# Patient Record
Sex: FEMALE | Race: WHITE
[De-identification: names, ages, dates, MRNs, and addresses within clinical notes are randomized per-mention and may not be internally consistent; named-entity substitution may affect disease eponyms.]

---

## 2017-01-14 NOTE — DIAGNOSTIC IMAGING REPORT
CHEST ONE VIEW PORTABLE



CLINICAL HISTORY: Sepsis SHORTNESS OF BREATH



COMPARISON STUDY:  September 2, 2016



FINDINGS: The heart is at the upper limits of normal in size. There is

radiographic evidence of emphysema. There is no overt failure. There are linear

bibasilar opacities, likely atelectatic.[ 



IMPRESSION: Emphysema. Bibasal atelectasis/scarring. No evidence of acute

parenchymal consolidation.







Electronically signed by:  rByce Fermin M.D.

1/14/2017 7:11 AM



Dictated Date/Time:  1/14/2017 7:10 AM

## 2017-01-14 NOTE — HOSPITALIST PROGRESS NOTE
Hospitalist Progress Note


Date of Service


Jan 14, 2017.





Subjective


Pt evaluation today including:  conversation w/ patient, physical exam, chart 

review, lab review, review of studies, review of inpatient medication list


Patient reports minimal improvement in symptoms since admission. + SOB, wheezing

, and productive cough- yellow sputum (chronic). She is eating and drinking OK. 

Patient denies any fever, chills, sweats, lightheadedness, dizziness, vision 

changes, CP, palpitations, edema, abdominal pain, nausea, vomiting, diarrhea, 

urinary symptoms, melena, numbness/tingling, weakness, muscle/joint pain, 

anxiety/depression, active bleeding, or new skin discoloration/changes.





Medications





 Current Inpatient Medications








 Medications


  (Trade)  Dose


 Ordered  Sig/Roya


 Route  Start Time


 Stop Time Status Last Admin


Dose Admin


 


 Acetaminophen


  (Tylenol Tab)  650 mg  Q4H  PRN


 PO  1/14/17 04:00


 2/13/17 03:59   


 


 


 Zolpidem Tartrate


  (Ambien Tab)  5 mg  HSZ  PRN


 PO  1/14/17 04:00


 2/13/17 03:59   


 


 


 Nitroglycerin


  (Nitrostat Tab)  0.4 mg  UD  PRN


 SL  1/14/17 04:00


 2/13/17 03:59   


 


 


 Cyanocobalamin


  (Vitamin B-12


 Tab)  1,000 mcg  DAILY


 PO  1/14/17 09:00


 2/13/17 08:59   


 


 


 Fluoxetine HCl


  (Prozac Cap)  20 mg  QAM


 PO  1/14/17 09:00


 2/13/17 08:59   


 


 


 Furosemide


  (Lasix tab)  20 mg  DAILY


 PO  1/14/17 09:00


 2/13/17 08:59   


 


 


 Glipizide


  (Glucotrol Tab)  5 mg  BIDM


 PO  1/14/17 08:00


 2/13/17 07:59   


 


 


 Metoprolol


 Succinate


  (Toprol Xl Tab)  100 mg  DAILY


 PO  1/14/17 09:00


 2/13/17 08:59   


 


 


 Pravastatin Sodium


  (Pravachol Tab)  10 mg  PM


 PO  1/14/17 21:00


 2/13/17 20:59   


 


 


 Ropinirole HCl


  (Requip Tab)  2 mg  TID


 PO  1/14/17 09:00


 2/13/17 08:59   


 


 


 Spironolactone


  (Aldactone Tab)  12.5 mg  QAM


 PO  1/14/17 09:00


 2/13/17 08:59   


 


 


 Tramadol HCl


  (Ultram Tab)  50 mg  Q6  PRN


 PO  1/14/17 04:00


 2/13/17 03:59   


 


 


 Ferrous Sulfate


  (Feosol Tab)  325 mg  DAILY


 PO  1/14/17 09:00


 2/13/17 08:59   


 


 


 Ondansetron HCl


  (Zofran Inj)  4 mg  Q6H  PRN


 IV  1/14/17 04:00


 2/13/17 03:59   


 


 


 Insulin Aspart


  (novoLOG ASPART)  **SLIDING


 SCALE**


 **If C...  ACHS


 SC  1/14/17 06:30


 2/13/17 06:59   


 


 


 Glucose


  (Glucose 40% Gel)    UD  PRN


 PO  1/14/17 04:00


 2/13/17 03:59   


 


 


 Glucose


  (Glucose Chew


 Tab)  1 tabs  UD  PRN


 PO  1/14/17 04:00


 2/13/17 03:59   


 


 


 Dextrose


  (Dextrose 50%


 50ML Syringe)  50 ml  UD  PRN


 IV  1/14/17 04:00


 2/13/17 03:59   


 


 


 Glucagon 1 mg  1 mg  UD  PRN


 SQ  1/14/17 04:00


 2/13/17 03:59   


 


 


 Piperacillin Sod/


 Tazobactam Sod


 4.5 gm/Dextrose  120 ml @ 


 30 mls/hr  Q8H


 IV  1/14/17 10:00


 1/21/17 09:59   


 


 


 Methylprednisolone


 Sodium Succinate/


 Syringe


  (Solu-Medrol IV/


 Syringe)  0.64 ml @ 


 1.5 mls/min  Q8H


 IV  1/14/17 10:00


 2/13/17 09:59   


 


 


 Guaifenesin


  (Organidin Nr


 Tab)  600 mg  BID


 PO  1/14/17 09:00


 2/13/17 08:59   


 


 


 Ipratropium


 Bromide


  (Atrovent 0.02%


 0.5MG/2.5ML Neb)  0.5 mg  Q6R


 INH  1/14/17 09:00


 2/13/17 08:59  1/14/17 07:29


0.5 MG


 


 Levalbuterol


  (Xopenex 1.25MG/


 0.5ML Neb)  1.25 mg  Q6R


 INH  1/14/17 09:00


 2/13/17 08:59  1/14/17 07:29


1.25 MG


 


 Ipratropium


 Bromide


  (Atrovent 0.02%


 0.5MG/2.5ML Neb)  0.5 mg  Q2H  PRN


 INH  1/14/17 04:15


 2/13/17 04:14   


 


 


 Levalbuterol


  (Xopenex 1.25MG/


 0.5ML Neb)  1.25 mg  Q2H  PRN


 INH  1/14/17 04:15


 2/13/17 04:14   


 


 


 Piperacillin Sod/


 Tazobactam Sod


  (Consult)  1 ea  UD  PRN


 N/A  1/14/17 05:15


 2/13/17 05:14   


 


 


 Diphenhydramine


 HCl


  (Benadryl Inj)  25 mg  Q4H  PRN


 IV  1/14/17 06:45


 2/13/17 06:44  1/14/17 06:48


25 MG


 


 Insulin Glargine


  (Lantus Solostar


 Pen)  22 unit  BID


 SC  1/14/17 21:00


 2/13/17 20:59   


 











Objective


Vital Signs











  Date Time  Temp Pulse Resp B/P Pulse Ox O2 Delivery O2 Flow Rate FiO2


 


1/14/17 06:59 36.8 95 18 144/78 91 Nasal Cannula 3.0 


 


1/14/17 05:33 37.3 115 26 117/67  Nasal Cannula 3.0 95


 


1/14/17 04:00  105 26 105/54 94 Nasal Cannula 3.0 


 


1/14/17 03:32  115 28  89   


 


1/14/17 03:28    117/67    


 


1/14/17 03:27  114 23  89   


 


1/14/17 03:22  123 30  85   


 


1/14/17 03:17  124 28  86   


 


1/14/17 03:12  115 28  100   


 


1/14/17 03:07  114 32  100   


 


1/14/17 03:02  113 31  100   


 


1/14/17 02:58    111/63    


 


1/14/17 02:57  112 26  100   


 


1/14/17 02:53    124/64    


 


1/14/17 02:52  113 30  100   


 


1/14/17 02:47  109 37  100   


 


1/14/17 02:42  107 26  100   


 


1/14/17 02:37  104 24  99   


 


1/14/17 02:32  104 24  100   


 


1/14/17 02:27  99 32  99   


 


1/14/17 02:22  97 24  99   


 


1/14/17 02:17  96 31  99   


 


1/14/17 02:12  96 24  98   


 


1/14/17 02:07  96 24  98   


 


1/14/17 02:02  92 22  97   


 


1/14/17 01:57  90 29  99   


 


1/14/17 01:52  90 30  98   


 


1/14/17 01:47  90 28  98   


 


1/14/17 01:42  87 36  98   


 


1/14/17 01:37  89 38  98   


 


1/14/17 01:32  92 29  91   


 


1/14/17 01:27  91 24  91   


 


1/14/17 01:22  92 31  91   


 


1/14/17 01:19     93 Nasal Cannula 4.0 


 


1/14/17 01:19     93 Nasal Cannula 4.0 


 


1/14/17 01:19 37.3 96 28 125/67 93 Nasal Cannula 4.0 


 


1/14/17 01:17  89 31  91   


 


1/14/17 01:12  84 18  92   


 


1/14/17 01:09    125/67    











Physical Exam


General Appearance:  no apparent distress, + obese


Eyes:  normal inspection, PERRL


ENT:  hearing grossly normal


Neck:  supple


Respiratory/Chest:  no respiratory distress, no accessory muscle use, + 

decreased breath sounds, + wheezing (diffuse )


Cardiovascular:  regular rate, rhythm


Abdomen:  normal bowel sounds, non tender, soft


Extremities:  no calf tenderness, + swelling (trace bilateral pitting edema )


Neurologic/Psychiatric:  alert, normal mood/affect, oriented x 3


Skin:  normal color, warm/dry, no rash





Laboratory Results





Last 24 Hours








Test


  1/14/17


01:48 1/14/17


01:49 1/14/17


02:00 1/14/17


02:33


 


Bedside Lactic Acid Venous 2.06 mmol/L    


 


Sodium Level  139 mmol/L   


 


Potassium Level  4.7 mmol/L   


 


Chloride Level  99 mmol/L   


 


Carbon Dioxide Level  32 mmol/L   


 


Anion Gap  8.0 mmol/L   


 


Blood Urea Nitrogen  42 mg/dl   


 


Creatinine  1.70 mg/dl   


 


Est Creatinine Clear Calc


Drug Dose 


  33.8 ml/min 


  


  


 


 


Estimated GFR (


American) 


  33.8 


  


  


 


 


Estimated GFR (Non-


American 


  29.2 


  


  


 


 


BUN/Creatinine Ratio  24.9   


 


Random Glucose  300 mg/dl   


 


Calcium Level  8.7 mg/dl   


 


Total Bilirubin  0.3 mg/dl   


 


Aspartate Amino Transf


(AST/SGOT) 


  22 U/L 


  


  


 


 


Alanine Aminotransferase


(ALT/SGPT) 


  28 U/L 


  


  


 


 


Alkaline Phosphatase  105 U/L   


 


Total Protein  6.2 gm/dl   


 


Albumin  3.1 gm/dl   


 


Globulin  3.1 gm/dl   


 


Albumin/Globulin Ratio  1.0   


 


Chemistry Specimen Hemolysis     


 


Prothrombin Time   10.6 SECONDS  


 


Prothromb Time International


Ratio 


  


  1.0 


  


 


 


Activated Partial


Thromboplast Time 


  


  24.5 SECONDS 


  


 


 


Partial Thromboplastin Ratio   0.9  


 


Total Creatine Kinase   36 U/L  


 


Creatine Kinase MB   0.5 ng/ml  


 


Creatine Kinase MB Ratio   1.4  


 


Troponin I   < 0.015 ng/ml  


 


Pro-B-Type Natriuretic Peptide   695 pg/ml  


 


White Blood Count    4.36 K/uL 


 


Red Blood Count    3.67 M/uL 


 


Hemoglobin    11.9 g/dL 


 


Hematocrit    36.3 % 


 


Mean Corpuscular Volume    98.9 fL 


 


Mean Corpuscular Hemoglobin    32.4 pg 


 


Mean Corpuscular Hemoglobin


Concent 


  


  


  32.8 g/dl 


 


 


Platelet Count    111 K/uL 


 


Mean Platelet Volume    10.7 fL 


 


Neutrophils (%) (Auto)    76.5 % 


 


Lymphocytes (%) (Auto)    13.5 % 


 


Monocytes (%) (Auto)    9.6 % 


 


Eosinophils (%) (Auto)    0.2 % 


 


Basophils (%) (Auto)    0.0 % 


 


Neutrophils # (Auto)    3.33 K/uL 


 


Lymphocytes # (Auto)    0.59 K/uL 


 


Monocytes # (Auto)    0.42 K/uL 


 


Eosinophils # (Auto)    0.01 K/uL 


 


Basophils # (Auto)    0.00 K/uL 


 


RDW Standard Deviation    49.5 fL 


 


RDW Coefficient of Variation    13.7 % 


 


Immature Granulocyte % (Auto)    0.2 % 


 


Immature Granulocyte # (Auto)    0.01 K/uL 














Test


  1/14/17


05:54 1/14/17


06:24 1/14/17


07:20 1/14/17


07:21


 


Lactic Acid Level  2.2 mmol/L   


 


Bedside Glucose   387 mg/dl  368 mg/dl 











Assessment and Plan


The patient is a 72-year-old female who presents emergency department with 

complaint of worsening shortness of breath over the past week.  The symptoms 

initially began as a head congestion that then extended into her chest.  She 

went to see her PCP earlier in the week was placed on Levaquin and Prednisone 

on Jan. 10th. She has not had any relief of her symptoms in fact they've been 

worsening.  She does use nebulizers at home without relief. Patient was 

admitted on 9/3/16 for acute COPD exacerbation. 





Acute exacerbation of chronic COPD:


-Admit to the [facility]


-Solu-Medrol 40 mg IV every 8 hours after having received on 125 mg IV and 

emergency department.  


-Vancomycin IV per renal dosing and Levofloxacin 500 mg IV every 24 hours d/c'd


-Zosyn 3.375 mg IV every 8 hours


-Guaifenesin extended release 600 mg by mouth twice a day


-Xopenex with Atrovent nebulizers to use every 6 hours while awake and every 2 

hours when necessary


-Continue QVAR inhaler BID 


-Pending blood cultures 


-Patient follows with Gray Robert PA-C 





T2DM:


-Continue Glipizide 5 mg by mouth twice a day 


-Patient was given a one-time dose of Lantus 20 units subcutaneous on admission 

due to starting IV steroids


-Lantus 22 units subcutaneous twice a day while on IV steroids, titrate as 

needed 


-BSG AC & HS with sliding insulin scale 





Hypertension/diastolic CHF:


-Continue Furosemide 40 mg by mouth twice a day 


-Continue Potassium extended release 20 mEq by mouth daily 


-Continue Metoprolol succinate 100 mg by mouth daily


-Continue Lisinopril 10 mg by mouth daily





Hypercholesterolemia:


-Continue Pravastatin 10 mg by mouth daily





Restless leg syndrome:


-Continue Ropinirole 2 mg by mouth 3 times a day





Depression:


-Continue Fluoxetine 20 mg by mouth every morning





Anemia:


-Continue iron supplement 325 mg PO daily 


-Continue b12 supplemental 1000 g by mouth daily





DVT prophylaxis:


-GEETHA and SCDs





Dispo:


-Return to home when medically stable

## 2017-01-14 NOTE — EMERGENCY ROOM VISIT NOTE
History


Report prepared by Randell:  Carine Oneal


Under the Supervision of:  Dr. Michelle Robertson D.O.


First contact with patient:  01:08


Chief Complaint:  RESPIRATORY PROBLEMS


Stated Complaint:  Difficulty Breathing, cold symptoms





History of Present Illness


The patient is a 72 year old female who presents to the Emergency Room with 

complaints of persistent shortness of breath that began Saturday, but has been 

worsening.  The patient states that Saturday she developed a head and chest 

cold.  She states that she talked with her PCP and was placed on Levaquin and 

Prednisone on Monday, but denies any relief of her symptoms.  The patient 

states that her symptoms seem to keep worsening.  She notes that she has been 

increasingly fatigued at home, but denies any fever.  The patient notes a 

history of COPD, but denies any history of pneumonia.  She states that she has 

nebulizers at home that she has been using without relief.  The patient states 

that she has been eating and drinking normally.





   Source of History:  patient


   Onset:  Saturday


   Position:  other (global)


   Quality:  other (shortness of breath)


   Timing:  worsening, other (persistent)


   Associated Symptoms:  + fatigue





Review of Systems


See HPI for pertinent positives & negatives. A total of 10 systems reviewed and 

were otherwise negative.





Past Medical & Surgical


Medical Problems:


(1) CHF (congestive heart failure)


(2) CHF exacerbation


(3) COPD (chronic obstructive pulmonary disease)


(4) Diab Sanjuana Wo Compl, Type Ii Or Unspec Type, Uncontrolled


(5) Hyperlipidemia Nec/Nos


(6) Hypertension Nos


(7) Hypoxemia


(8) Respiratory failure, acute-on-chronic








Family History





Cancer


Diabetes mellitus


FH: heart disease





Social History


Smoking Status:  Former Smoker


Alcohol Use:  none


Drug Use:  none


Marital Status:  single


Housing Status:  nursing home


Occupation Status:  retired





Current/Historical Medications


Scheduled


Beclomethasone Dipropionate (Qvar), 2 PUFFS INH BID


Cyanocobalamin (Vitamin B12 500MCG), 1,000 MCG PO DAILY


Ferrous Sulfate (Iron Supplement), 325 MG PO DAILY


Fluoxetine Hcl (Prozac), 20 MG PO QAM


Furosemide (Lasix), 20 MG PO DAILY


Glipizide (Glipizide), 5 MG PO BID


Ipratropium-Albuterol (Duoneb), 1 TREATMENT INH DAILY


Levofloxacin (Levaquin), 500 MG PO DAILY


Lisinopril (Prinivil), 10 MG PO DAILY


Metoprolol Succ (Toprol Xl) (Toprol-Xl ), 100 MG PO DAILY


Pravastatin Sodium (Pravastatin Sodium), 10 MG PO DAILY


Ropinirole (Requip), 2 MG PO TID


Spironolactone (Aldactone), 12.5 MG PO QAM





Scheduled PRN


Amoxicillin (Amoxil), 500 MG PO UD PRN for DENTAL WORK


Tramadol HCl (Tramadol HCl), 50 MG PO Q6 PRN for Pain





Allergies


Coded Allergies:  


     Atropine (Verified  Allergy, Severe, RASH, 1/14/17)


     Dobutamine (Verified  Allergy, Severe, RASH, 1/14/17)





Physical Exam


Vital Signs











  Date Time  Temp Pulse Resp B/P Pulse Ox O2 Delivery O2 Flow Rate FiO2


 


1/14/17 01:19     93 Nasal Cannula 4.0 


 


1/14/17 01:19     93 Nasal Cannula 4.0 


 


1/14/17 01:19 37.3 96 28 125/67 93 Nasal Cannula 4.0 











Physical Exam


HEENT: Head - normocephalic and atraumatic   Pupils are equal, round, and 

reactive to light.  Extraocular eye muscles are intact, and sclera are 

anicteric.   Nose -  moist nasal mucosa without discharge. Mouth - moist buccal 

mucosa.  Oropharynx is nonerythematous and there is no tonsillar exudate or 

edema noted.


Neck: Supple; no JVD, nuchal rigidity, cervical lymphadenopathy.


Heart: Tachycardic rate and rhythm.  There is a normal S1 and S2 with no murmurs

, clicks, or gallops appreciated.


Lungs: Inspiratory and expiratory wheezing.


Abdomen: Soft, completely nontender, nondistended, with good bowel sounds.  

There are no palpable pulsatile masses or hepatosplenomegaly.  There is no 

guarding, rigidity, or rebound noted.


Extremities: Trace pedal edema. No evidence of cyanosis, clubbing.  There are 

easily palpable peripheral pulses.


Skin: Skin is hot.  warm and dry with good turgor and no rashes.





Medical Decision & Procedures


ER Provider


Diagnostic Interpretation:


Portable chest x-ray: compared from September 2016: no significant change, mild 

pulmonary vascular congestion but no consolidation





Laboratory Results


1/14/17 02:33








Red Blood Count 3.67, Mean Corpuscular Volume 98.9, Mean Corpuscular Hemoglobin 

32.4, Mean Corpuscular Hemoglobin Concent 32.8, Mean Platelet Volume 10.7, 

Neutrophils (%) (Auto) 76.5, Lymphocytes (%) (Auto) 13.5, Monocytes (%) (Auto) 

9.6, Eosinophils (%) (Auto) 0.2, Basophils (%) (Auto) 0.0, Neutrophils # (Auto) 

3.33, Lymphocytes # (Auto) 0.59, Monocytes # (Auto) 0.42, Eosinophils # (Auto) 

0.01, Basophils # (Auto) 0.00





1/14/17 01:49

















Test


  1/14/17


01:48 1/14/17


01:49 1/14/17


02:00 1/14/17


02:33


 


Bedside Lactic Acid Venous


  2.06 mmol/L


(0.90-1.70) 


  


  


 


 


Anion Gap


  


  8.0 mmol/L


(3-11) 


  


 


 


Est Creatinine Clear Calc


Drug Dose 


  33.8 ml/min 


  


  


 


 


Estimated GFR (


American) 


  33.8 


  


  


 


 


Estimated GFR (Non-


American 


  29.2 


  


  


 


 


BUN/Creatinine Ratio  24.9 (10-20)   


 


Calcium Level


  


  8.7 mg/dl


(8.5-10.1) 


  


 


 


Total Bilirubin


  


  0.3 mg/dl


(0.2-1) 


  


 


 


Aspartate Amino Transf


(AST/SGOT) 


  22 U/L (15-37) 


  


  


 


 


Alanine Aminotransferase


(ALT/SGPT) 


  28 U/L (12-78) 


  


  


 


 


Alkaline Phosphatase


  


  105 U/L


() 


  


 


 


Total Protein


  


  6.2 gm/dl


(6.4-8.2) 


  


 


 


Albumin


  


  3.1 gm/dl


(3.4-5.0) 


  


 


 


Globulin


  


  3.1 gm/dl


(2.5-4.0) 


  


 


 


Albumin/Globulin Ratio  1.0 (0.9-2)   


 


Chemistry Specimen Hemolysis     


 


Prothrombin Time


  


  


  10.6 SECONDS


(9.0-12.0) 


 


 


Prothromb Time International


Ratio 


  


  1.0 (0.9-1.1) 


  


 


 


Activated Partial


Thromboplast Time 


  


  24.5 SECONDS


(21.0-31.0) 


 


 


Partial Thromboplastin Ratio   0.9  


 


Total Creatine Kinase


  


  


  36 U/L


() 


 


 


Creatine Kinase MB


  


  


  0.5 ng/ml


(0.5-3.6) 


 


 


Creatine Kinase MB Ratio   1.4 (0-3.0)  


 


Troponin I


  


  


  < 0.015 ng/ml


(0-0.045) 


 


 


Pro-B-Type Natriuretic Peptide


  


  


  695 pg/ml


(0-900) 


 


 


White Blood Count


  


  


  


  4.36 K/uL


(4.8-10.8)


 


Red Blood Count


  


  


  


  3.67 M/uL


(4.2-5.4)


 


Hemoglobin


  


  


  


  11.9 g/dL


(12.0-16.0)


 


Hematocrit    36.3 % (37-47) 


 


Mean Corpuscular Volume


  


  


  


  98.9 fL


()


 


Mean Corpuscular Hemoglobin


  


  


  


  32.4 pg


(25-34)


 


Mean Corpuscular Hemoglobin


Concent 


  


  


  32.8 g/dl


(32-36)


 


Platelet Count


  


  


  


  111 K/uL


(130-400)


 


Mean Platelet Volume


  


  


  


  10.7 fL


(7.4-10.4)


 


Neutrophils (%) (Auto)    76.5 % 


 


Lymphocytes (%) (Auto)    13.5 % 


 


Monocytes (%) (Auto)    9.6 % 


 


Eosinophils (%) (Auto)    0.2 % 


 


Basophils (%) (Auto)    0.0 % 


 


Neutrophils # (Auto)


  


  


  


  3.33 K/uL


(1.4-6.5)


 


Lymphocytes # (Auto)


  


  


  


  0.59 K/uL


(1.2-3.4)


 


Monocytes # (Auto)


  


  


  


  0.42 K/uL


(0.11-0.59)


 


Eosinophils # (Auto)


  


  


  


  0.01 K/uL


(0-0.5)


 


Basophils # (Auto)


  


  


  


  0.00 K/uL


(0-0.2)


 


RDW Standard Deviation


  


  


  


  49.5 fL


(36.4-46.3)


 


RDW Coefficient of Variation


  


  


  


  13.7 %


(11.5-14.5)


 


Immature Granulocyte % (Auto)    0.2 % 


 


Immature Granulocyte # (Auto)


  


  


  


  0.01 K/uL


(0.00-0.02)








Laboratory results per my review.





Medications Administered











 Medications


  (Trade)  Dose


 Ordered  Sig/Roya


 Route  Start Time


 Stop Time Status Last Admin


Dose Admin


 


 Albuterol/


 Ipratropium


  (Duoneb)  12 ml  ONE  ONCE


 INH  1/14/17 01:30


 1/14/17 01:31 DC 1/14/17 01:37


12 ML


 


 Methylprednisolone


 Sodium Succinate


  (Solu-Medrol IV)  125 mg  NOW  STAT


 IV  1/14/17 02:10


 1/14/17 02:11 DC 1/14/17 02:41


125 MG


 


 Piperacillin Sod/


 Tazobactam Sod


  (Zosyn Iv)  4.5 gm  NOW  STAT


 IV  1/14/17 02:30


 1/14/17 02:31 DC 1/14/17 02:41


4.5 GM











Procedure


Patient was treated with DuoNeb 12 ml INH, Solu-Medrol  mg IV, and Zosyn 

IV 4.5 gm IV.





ECG


Indication:  SOB/dyspnea


Rate (beats per minute):  110


Rhythm:  sinus tachycardia


Findings:  no acute ischemic change, no ectopy





ED Course


0113: Past medical records reviewed. The patient was evaluated in room A3. A 

complete history and physical exam was performed.  A twelve-lead EKG was 

obtained.  An IV lock was initiated and labs were drawn as above.





0130: Ordered DuoNeb 12 ml INH.  A chest x-ray was performed.





0207: I reevaluated the patient and she is still not feeling better, noting 

that she is still short of breath.  She is currently receiving her hour long 

DuoNeb treatment.  I discussed her recent steroid prescription and she notes 

that it was a taper.  I discussed the treatment plan with her.  She verbalized 

complete understanding and agreement.  She will be evaluated for further 

treatment.





0210: Ordered Solu-Medrol  mg IV.





0230: Ordered Zosyn IV 4.5 gm IV.





0326: I discussed the patient's case with MEGAN Lara.  He is going 

to evaluate the patient for further treatment.





Medical Decision


The patient is a 72 year old female who presents to the ED with shortness of 

breath. Differential diagnosis includes COPD exacerbation, pneumonia, bronchitis

, CHF.





Lab interpretation: Lactic acid is 2.06, white count 4.3, hemoglobin 11.9, 

platelet 111, glucose 300, BUN 42, Creatinine 1.7, trop less than 0.015, 

, Co-Ags are normal.





This is a 72-year-old female patient presents to the emergency department with 

increasing shortness of breath despite being on steroid taper and antibiotics.





The patient has a history of COPD exacerbations.  She has also had pneumonia 

and bronchitis in the past.  Chest x-ray shows no obvious consolidation but her 

description of symptoms sound consistent with pneumonia or bronchitis.  Patient 

was getting some improvement in her symptoms with the hour-long nebulizer 

treatment.





I discussed the case with the [facility] and they will evaluate 

for further management.





Consults


Time Called:  0307


Consulting Physician:  MEGAN Lara


Returned Call:  [phone]


I discussed the patient's case with MEGAN Lara.  He is going to 

evaluate the patient for further treatment.





Impression





 Primary Impression:  


 COPD exacerbation





Scribe Attestation


The scribe's documentation has been prepared under my direction and personally 

reviewed by me in its entirety. I confirm that the note above accurately 

reflects all work, treatment, procedures, and medical decision making performed 

by me.





Departure Information


Dispostion


Being Evaluated By Hospitalist





Referrals


Marty Tineo D.O. (PCP)

## 2017-01-15 NOTE — HOSPITALIST PROGRESS NOTE
Hospitalist Progress Note


Date of Service


Colin 15, 2017.





Subjective


Pt evaluation today including:  conversation w/ patient, physical exam, chart 

review, lab review, review of inpatient medication list


Voiding:  no voiding problems, no incontinence


Patient states she is feeling lousy today. SOB/non-productive cough has not 

improved. Extremely SOB with exertion. +body aches. Difficulty with eating/

drinking because of SOB.  Patient denies any fever, chills, sweats, 

lightheadedness, dizziness, vision changes, CP, palpitations, edema, abdominal 

pain, nausea, vomiting, diarrhea, urinary symptoms, melena, numbness/tingling, 

weakness, anxiety/depression, active bleeding, or new skin discoloration/

changes.





Medications





 Current Inpatient Medications








 Medications


  (Trade)  Dose


 Ordered  Sig/Roya


 Route  Start Time


 Stop Time Status Last Admin


Dose Admin


 


 Acetaminophen


  (Tylenol Tab)  650 mg  Q4H  PRN


 PO  1/14/17 04:00


 2/13/17 03:59   


 


 


 Zolpidem Tartrate


  (Ambien Tab)  5 mg  HSZ  PRN


 PO  1/14/17 04:00


 2/13/17 03:59   


 


 


 Nitroglycerin


  (Nitrostat Tab)  0.4 mg  UD  PRN


 SL  1/14/17 04:00


 2/13/17 03:59   


 


 


 Cyanocobalamin


  (Vitamin B-12


 Tab)  1,000 mcg  DAILY


 PO  1/14/17 09:00


 2/13/17 08:59  1/14/17 08:41


1,000 MCG


 


 Fluoxetine HCl


  (Prozac Cap)  20 mg  QAM


 PO  1/14/17 09:00


 2/13/17 08:59  1/14/17 08:43


20 MG


 


 Furosemide


  (Lasix tab)  20 mg  DAILY


 PO  1/14/17 09:00


 2/13/17 08:59  1/14/17 08:44


20 MG


 


 Glipizide


  (Glucotrol Tab)  5 mg  BIDM


 PO  1/14/17 08:00


 2/13/17 07:59  1/14/17 17:39


5 MG


 


 Metoprolol


 Succinate


  (Toprol Xl Tab)  100 mg  DAILY


 PO  1/14/17 09:00


 2/13/17 08:59  1/14/17 08:42


100 MG


 


 Pravastatin Sodium


  (Pravachol Tab)  10 mg  PM


 PO  1/14/17 21:00


 2/13/17 20:59  1/14/17 20:27


10 MG


 


 Ropinirole HCl


  (Requip Tab)  2 mg  TID


 PO  1/14/17 09:00


 2/13/17 08:59  1/14/17 20:27


2 MG


 


 Spironolactone


  (Aldactone Tab)  12.5 mg  QAM


 PO  1/14/17 09:00


 2/13/17 08:59  1/14/17 08:44


12.5 MG


 


 Tramadol HCl


  (Ultram Tab)  50 mg  Q6  PRN


 PO  1/14/17 04:00


 2/13/17 03:59   


 


 


 Ferrous Sulfate


  (Feosol Tab)  325 mg  DAILY


 PO  1/14/17 09:00


 2/13/17 08:59  1/14/17 08:41


325 MG


 


 Ondansetron HCl


  (Zofran Inj)  4 mg  Q6H  PRN


 IV  1/14/17 04:00


 2/13/17 03:59   


 


 


 Insulin Aspart


  (novoLOG ASPART)  **SLIDING


 SCALE**


 **If C...  ACHS


 SC  1/14/17 06:30


 2/13/17 06:59  1/14/17 17:44


4 UNITS


 


 Glucose


  (Glucose 40% Gel)    UD  PRN


 PO  1/14/17 04:00


 2/13/17 03:59   


 


 


 Glucose


  (Glucose Chew


 Tab)  1 tabs  UD  PRN


 PO  1/14/17 04:00


 2/13/17 03:59   


 


 


 Dextrose


  (Dextrose 50%


 50ML Syringe)  50 ml  UD  PRN


 IV  1/14/17 04:00


 2/13/17 03:59   


 


 


 Glucagon 1 mg  1 mg  UD  PRN


 SQ  1/14/17 04:00


 2/13/17 03:59   


 


 


 Piperacillin Sod/


 Tazobactam Sod


 4.5 gm/Dextrose  120 ml @ 


 30 mls/hr  Q8H


 IV  1/14/17 10:00


 1/21/17 09:59  1/15/17 02:19


30 MLS/HR


 


 Methylprednisolone


 Sodium Succinate/


 Syringe


  (Solu-Medrol IV/


 Syringe)  0.64 ml @ 


 1.5 mls/min  Q8H


 IV  1/14/17 10:00


 2/13/17 09:59  1/15/17 02:19


1.5 MLS/MIN


 


 Guaifenesin


  (Organidin Nr


 Tab)  600 mg  BID


 PO  1/14/17 09:00


 2/13/17 08:59  1/14/17 20:26


600 MG


 


 Ipratropium


 Bromide


  (Atrovent 0.02%


 0.5MG/2.5ML Neb)  0.5 mg  Q6R


 INH  1/14/17 09:00


 2/13/17 08:59  1/15/17 07:47


0.5 MG


 


 Levalbuterol


  (Xopenex 1.25MG/


 0.5ML Neb)  1.25 mg  Q6R


 INH  1/14/17 09:00


 2/13/17 08:59  1/15/17 07:47


1.25 MG


 


 Ipratropium


 Bromide


  (Atrovent 0.02%


 0.5MG/2.5ML Neb)  0.5 mg  Q2H  PRN


 INH  1/14/17 04:15


 2/13/17 04:14   


 


 


 Levalbuterol


  (Xopenex 1.25MG/


 0.5ML Neb)  1.25 mg  Q2H  PRN


 INH  1/14/17 04:15


 2/13/17 04:14   


 


 


 Piperacillin Sod/


 Tazobactam Sod


  (Consult)  1 ea  UD  PRN


 N/A  1/14/17 05:15


 2/13/17 05:14   


 


 


 Diphenhydramine


 HCl


  (Benadryl Inj)  25 mg  Q4H  PRN


 IV  1/14/17 06:45


 2/13/17 06:44  1/14/17 06:48


25 MG


 


 Beclomethasone


 Dipropionate


  (Qvar 80 Mcg Hfa


 Inhaler)  2 puffs  BID


 INH  1/14/17 12:00


 2/13/17 11:59  1/14/17 20:25


2 PUFFS


 


 Insulin Glargine


  (Lantus Solostar


 Pen)  26 unit  BID


 SC  1/14/17 21:00


 2/13/17 20:59  1/14/17 20:21


26 UNIT


 


 Oseltamivir


 Phosphate


  (Tamiflu Susp)  30 mg  BID


 PO  1/15/17 06:00


 1/19/17 20:59  1/15/17 06:17


30 MG


 


 Calcium Carbonate


  (Tums Chew Tab)  500 mg  PRN  PRN


 PO  1/14/17 21:45


 2/13/17 21:44  1/14/17 21:44


500 MG











Objective


Vital Signs











  Date Time  Temp Pulse Resp B/P Pulse Ox O2 Delivery O2 Flow Rate FiO2


 


1/15/17 07:47  77 20  96 Nasal Cannula 3.0 


 


1/15/17 06:50 36.5 67 20 145/91 93 Nasal Cannula 3.0 


 


1/15/17 04:07 36.5 68 16 145/80 97 Nasal Cannula 3.0 


 


1/15/17 03:52      Nasal Cannula 3.0 


 


1/15/17 02:24  72 20  96 Nasal Cannula 3.0 


 


1/15/17 00:00      Nasal Cannula 3.0 


 


1/14/17 23:29 36.5 69 16 148/84 96 Nasal Cannula 3.0 


 


1/14/17 20:54  67 20  96 Nasal Cannula 3.0 


 


1/14/17 20:00      Nasal Cannula 3.0 


 


1/14/17 19:06 36.6 70 18 146/82 95 Nasal Cannula 3.0 


 


1/14/17 16:00     91 Nasal Cannula 3.0 


 


1/14/17 14:49 36.9 69 18 148/81 95 Nasal Cannula 2.0 


 


1/14/17 14:29  68 20  97 Nasal Cannula 3.0 


 


1/14/17 12:00     91 Nasal Cannula 3.0 


 


1/14/17 11:18 36.7 78 18 168/96 96 Room Air  











Physical Exam


General Appearance:  no apparent distress, + obese


Eyes:  normal inspection, PERRL


ENT:  hearing grossly normal


Neck:  supple


Respiratory/Chest:  no respiratory distress, no accessory muscle use, + 

decreased breath sounds, + wheezing


Cardiovascular:  regular rate, rhythm, no edema


Abdomen:  normal bowel sounds, non tender, soft


Extremities:  no pedal edema, no calf tenderness


Neurologic/Psychiatric:  alert, normal mood/affect, oriented x 3


Skin:  normal color, warm/dry, no rash





Laboratory Results





Last 24 Hours








Test


  1/14/17


10:00 1/14/17


11:28 1/14/17


16:11 1/14/17


20:07


 


Hepatitis C Antibody Screen NEG    


 


Bedside Glucose  428 mg/dl  150 mg/dl  109 mg/dl 














Test


  1/15/17


07:36 1/15/17


07:44 


  


 


 


White Blood Count 6.02 K/uL    


 


Red Blood Count 3.68 M/uL    


 


Hemoglobin 11.8 g/dL    


 


Hematocrit 35.1 %    


 


Mean Corpuscular Volume 95.4 fL    


 


Mean Corpuscular Hemoglobin 32.1 pg    


 


Mean Corpuscular Hemoglobin


Concent 33.6 g/dl 


  


  


  


 


 


Platelet Count 121 K/uL    


 


Mean Platelet Volume 10.7 fL    


 


Neutrophils (%) (Auto) 90.9 %    


 


Lymphocytes (%) (Auto) 5.3 %    


 


Monocytes (%) (Auto) 3.5 %    


 


Eosinophils (%) (Auto) 0.0 %    


 


Basophils (%) (Auto) 0.0 %    


 


Neutrophils # (Auto) 5.47 K/uL    


 


Lymphocytes # (Auto) 0.32 K/uL    


 


Monocytes # (Auto) 0.21 K/uL    


 


Eosinophils # (Auto) 0.00 K/uL    


 


Basophils # (Auto) 0.00 K/uL    


 


RDW Standard Deviation 47.1 fL    


 


RDW Coefficient of Variation 13.4 %    


 


Immature Granulocyte % (Auto) 0.3 %    


 


Immature Granulocyte # (Auto) 0.02 K/uL    


 


Prothrombin Time 11.1 SECONDS    


 


Prothromb Time International


Ratio 1.0 


  


  


  


 


 


Activated Partial


Thromboplast Time 24.0 SECONDS 


  


  


  


 


 


Partial Thromboplastin Ratio 0.9    


 


Sodium Level 137 mmol/L    


 


Potassium Level 4.7 mmol/L    


 


Chloride Level 95 mmol/L    


 


Carbon Dioxide Level 33 mmol/L    


 


Anion Gap 9.0 mmol/L    


 


Blood Urea Nitrogen 49 mg/dl    


 


Creatinine 1.80 mg/dl    


 


Est Creatinine Clear Calc


Drug Dose 32.0 ml/min 


  


  


  


 


 


Estimated GFR (


American) 31.6 


  


  


  


 


 


Estimated GFR (Non-


American 27.2 


  


  


  


 


 


BUN/Creatinine Ratio 27.0    


 


Random Glucose 230 mg/dl    


 


Calcium Level 8.7 mg/dl    


 


Magnesium Level 2.3 mg/dl    


 


Bedside Glucose  234 mg/dl   











Assessment and Plan


The patient is a 72-year-old female who presents emergency department with 

complaint of worsening shortness of breath over the past week.  The symptoms 

initially began as a head congestion that then extended into her chest.  She 

went to see her PCP earlier in the week was placed on Levaquin and Prednisone 

on Jan. 10th. She has not had any relief of her symptoms in fact they've been 

worsening.  She does use nebulizers at home without relief. Patient was 

admitted on 9/3/16 for acute COPD exacerbation. 





Acute exacerbation of chronic COPD:


-Admit to the tele


-Solu-Medrol 40 mg IV every 8 hours after having received on 125 mg IV and 

emergency department (11/14). [place] as patient's status improves.   


-Vancomycin IV per renal dosing and Levofloxacin 500 mg IV every 24 hours d/c'd


-Zosyn 3.375 mg IV every 8 hours


-Guaifenesin extended release 600 mg by mouth twice a day


-Xopenex with Atrovent nebulizers to use every 6 hours while awake and every 2 

hours when necessary


-Continue QVAR inhaler BID 


-Pending blood cultures- preliminary negative  


-Requiring 3L O2 with saturations in the low 90's (11/15)- wears 3L at home. 


-Patient follows with Gray Robert PA-C 





Influenza A:


-Tamiflu 75 mg PO BID x5 days (started 1/14, PM)


-Droplet precautions 





T2DM:


-Continue Glipizide 5 mg by mouth twice a day 


-Patient was given a one-time dose of Lantus 20 units subcutaneous on admission 

due to starting IV steroids


-Lantus 22 units subcutaneous twice a day while on IV steroids, titrate as 

needed 


   -- Increased to 26 units BID- better control of sugars 


-BSG AC & HS with sliding insulin scale 





Hypertension/diastolic CHF:


-Continue Furosemide 40 mg by mouth twice a day 


-Continue Potassium extended release 20 mEq by mouth daily 


-Continue Metoprolol succinate 100 mg by mouth daily


-Continue Lisinopril 10 mg by mouth daily





Hypercholesterolemia:


-Continue Pravastatin 10 mg by mouth daily





Restless leg syndrome:


-Continue Ropinirole 2 mg by mouth 3 times a day





Depression:


-Continue Fluoxetine 20 mg by mouth every morning





Anemia:


-Continue iron supplement 325 mg PO daily 


-Continue b12 supplemental 1000 g by mouth daily





CKD, stage III, baseline cr. ~1.7:


-Stable. Follow PRP 





DVT prophylaxis:


-GEETHA and SCDs





Dispo:


-Return to ?home when medically stable


-Likely need PT/OT evaluations prior to discharge

## 2017-01-15 NOTE — PHARMACY PROGRESS NOTE
Pharmacy Antibiotic Consult


Date of Service:


Colin 15, 2017.


Pharmacy Dosing Scope


Pharmacy is consulted to initiate vancomycin IV and pip/tazo IV dosing therapy, 

order appropriate labs and adjust drug dose/frequency.





Subjective


The patient is a 72 year old female admitted on Jan 14, 2017 at 03:55.





Objective


Height (Feet):  5


Height (Inches):  3.00


Weight (Kilograms):  106.000


Lab Results (24hrs):





Laboratory Tests








Test


  1/15/17


07:36


 


BUN/Creatinine Ratio 27.0 


 


Blood Urea Nitrogen 49 mg/dl 


 


Creatinine 1.80 mg/dl 


 


White Blood Count 6.02 K/uL 


 


Red Blood Count 3.68 M/uL 


 


Hemoglobin 11.8 g/dL 


 


Hematocrit 35.1 % 


 


Mean Corpuscular Volume 95.4 fL 


 


Mean Corpuscular Hemoglobin 32.1 pg 


 


Mean Corpuscular Hemoglobin


Concent 33.6 g/dl 


 


 


Platelet Count 121 K/uL 


 


Mean Platelet Volume 10.7 fL 


 


Neutrophils (%) (Auto) 90.9 % 


 


Lymphocytes (%) (Auto) 5.3 % 


 


Monocytes (%) (Auto) 3.5 % 


 


Eosinophils (%) (Auto) 0.0 % 


 


Basophils (%) (Auto) 0.0 % 


 


Neutrophils # (Auto) 5.47 K/uL 


 


Lymphocytes # (Auto) 0.32 K/uL 


 


Monocytes # (Auto) 0.21 K/uL 


 


Eosinophils # (Auto) 0.00 K/uL 


 


Basophils # (Auto) 0.00 K/uL 











Assessment & Plan


ASSESSMENT:


* Patient is a 72 year-old female admitted with pneumonia and COPD. 


* It is noted she was placed on levofloxacin and prednisone outpatient 4 days 

prior to admission and failed therapy. 


* Patient also has poor renal function and an elevated BMI >35kg/m2. 


* Patient is also on tamiflu, per physician management. 


* Pharmacy was consulted to dose the vancomycin IV and pip/tazo IV therapies. 


* Calculated half-life to be ~23hrs. 


* BCx2 show NGTD. 





PLAN:


VANCOMYCIN:


* Vancomycin was given as a load on 1/14 at 2500mg IV x 1 dose on 1/14 ~23mg/kg 

and then was stopped per the physician. 


* It was restarted today at 1300mg IV x 1 dose ~12mg/kg as patient had 

clinically declined. 


* Will get a random level tomorrow morning and will redose patient based off of 

this level for continued maintenance doses. 





PIP/TAZO:


* Will continue patient on the maintenance doses of 4.5 g IV q8h due to CrC>20ml

/min and BMI>30kg/m3.





Pharmacy will continue to follow and will adjust dose/frequency as necessary.  

Thank you

## 2017-01-16 NOTE — HOSPITALIST PROGRESS NOTE
Hospitalist Progress Note


Date of Service


Jan 16, 2017.





Subjective


Pt evaluation today including:  conversation w/ patient, physical exam, chart 

review, lab review, review of studies, review of inpatient medication list


Pain:  None


PO Intake:  Tolerating PO diet, decreased appetite


Voiding:  no voiding problems


Patient states that her breathing is about the same as yesterday.  She 

complains of shortness of breath at rest, dyspnea on exertion, a non-productive 

cough, and intermittent wheezing.  She also complains of decreased appetite, 

although she is tolerating PO food.  She does state that her weakness and 

fatigue have improved from yesterday, although she is still not back to 

baseline.  The patient denies fevers, chills, sweats, chest pain, palpitations, 

claudication, nausea, vomiting, abdominal pain, dysuria, hematuria, urinary 

retention, paralysis, numbness and tingling.





   Additional Comments:


See HPI for pertinent positives and negatives.  All other systems reviewed and 

negative.





Objective


Vital Signs











  Date Time  Temp Pulse Resp B/P Pulse Ox O2 Delivery O2 Flow Rate FiO2


 


1/16/17 08:45      Nasal Cannula 3.0 


 


1/16/17 06:45 36.5 67 80 159/92 96 Nasal Cannula 3.0 


 


1/16/17 02:29  68 18  98 Nasal Cannula 3.0 


 


1/16/17 00:05      Nasal Cannula 3.0 


 


1/15/17 23:33 36.6 67 18 154/84 95 Nasal Cannula 3.0 


 


1/15/17 19:48  67 18  98 Nasal Cannula 3.0 


 


1/15/17 16:00     96 Nasal Cannula 3.0 


 


1/15/17 15:28 36.7 76 20 177/92 96 Nasal Cannula 4.0 











Physical Exam


General Appearance:  WD/WN, no apparent distress, + obese (morbidly obese)


Eyes:  normal inspection, PERRL, EOMI


ENT:  normal ENT inspection, hearing grossly normal, pharynx normal, + 

pertinent finding (no signs of thrush)


Neck:  supple, no JVD, trachea midline


Respiratory/Chest:  lungs clear, no respiratory distress, + decreased breath 

sounds, + pertinent finding (very poor air movement)


Cardiovascular:  regular rate, rhythm, no gallop, no murmur


Abdomen:  normal bowel sounds, non tender, soft


Extremities:  non-tender, normal inspection, no pedal edema


Neurologic/Psychiatric:  alert, normal mood/affect, oriented x 3


Skin:  normal color, warm/dry, no rash





Laboratory Results





Last 24 Hours








Test


  1/15/17


16:50 1/15/17


20:11 1/16/17


06:07 1/16/17


07:29


 


Bedside Glucose 275 mg/dl  236 mg/dl   221 mg/dl 


 


White Blood Count   7.45 K/uL  


 


Red Blood Count   4.08 M/uL  


 


Hemoglobin   13.1 g/dL  


 


Hematocrit   39.3 %  


 


Mean Corpuscular Volume   96.3 fL  


 


Mean Corpuscular Hemoglobin   32.1 pg  


 


Mean Corpuscular Hemoglobin


Concent 


  


  33.3 g/dl 


  


 


 


Platelet Count   126 K/uL  


 


Mean Platelet Volume   10.7 fL  


 


Neutrophils (%) (Auto)   90.3 %  


 


Lymphocytes (%) (Auto)   6.4 %  


 


Monocytes (%) (Auto)   2.4 %  


 


Eosinophils (%) (Auto)   0.0 %  


 


Basophils (%) (Auto)   0.1 %  


 


Neutrophils # (Auto)   6.72 K/uL  


 


Lymphocytes # (Auto)   0.48 K/uL  


 


Monocytes # (Auto)   0.18 K/uL  


 


Eosinophils # (Auto)   0.00 K/uL  


 


Basophils # (Auto)   0.01 K/uL  


 


RDW Standard Deviation   46.9 fL  


 


RDW Coefficient of Variation   13.2 %  


 


Immature Granulocyte % (Auto)   0.8 %  


 


Immature Granulocyte # (Auto)   0.06 K/uL  


 


Prothrombin Time   11.3 SECONDS  


 


Prothromb Time International


Ratio 


  


  1.1 


  


 


 


Activated Partial


Thromboplast Time 


  


  22.3 SECONDS 


  


 


 


Partial Thromboplastin Ratio   0.9  


 


Sodium Level   139 mmol/L  


 


Potassium Level   4.3 mmol/L  


 


Chloride Level   96 mmol/L  


 


Carbon Dioxide Level   35 mmol/L  


 


Anion Gap   8.0 mmol/L  


 


Blood Urea Nitrogen   50 mg/dl  


 


Creatinine   1.80 mg/dl  


 


Est Creatinine Clear Calc


Drug Dose 


  


  32.0 ml/min 


  


 


 


Estimated GFR (


American) 


  


  31.6 


  


 


 


Estimated GFR (Non-


American 


  


  27.2 


  


 


 


BUN/Creatinine Ratio   28.0  


 


Random Glucose   227 mg/dl  


 


Calcium Level   8.7 mg/dl  


 


Magnesium Level   2.5 mg/dl  














Test


  1/16/17


09:45 1/16/17


11:29 


  


 


 


Random Vancomycin Level 17.2 mcg/ml    


 


Bedside Glucose  265 mg/dl   











Diagnostic Results


Reviewed EKG and agree with interpretation as follows:





69 bpm, NSR





Assessment and Plan


73 y/o female with a history of COPD, CHF, DM II, HTN, and HLD who presents to 

the ED on 1/14 with worsening shortness of breath over the past week.  The 

symptoms initially began as a head congestion that then extended into her 

chest.  She went to see her PCP earlier in the week was placed on Levaquin and 

Prednisone on Jan. 10th. She has not had any relief of her symptoms.  She does 

use nebulizers at home without relief. Patient was admitted on 9/3/16 for acute 

COPD exacerbation. 





Acute exacerbation of chronic COPD


-Admit to telemetry


-Continue Solu-Medrol 40 mg IV q8h as pt still has very poor air movement, no 

improvement of sx.  Received Solu-Medrol 125 mg IV x 1 in ED on 1/14.


-D/C Vancomycin IV and Levofloxacin 500 mg IV 


-Continue Zosyn IV


-Guaifenesin extended release 600 mg PO BID


-Xopenex/Atrovent nebulizers q6hWA and q2h prn SOB/wheezing


-Continue QVAR inhaler BID 


-Blood cultures NGTD x 2


-Requiring 3L O2 with saturations in mid to high 90s (1/16)- wears 3L at home. 


-Patient follows with Gray Robert PA-C, requested to see him when he comes for 

hospital rounds on 1/17.  Consult order placed


-Sputum culture if produced





Influenza A


-Tamiflu 30 mg PO BID x5 days (started 1/14, PM).


-Droplet precautions 





Diabetes mellitus type 2--last recorded HgbA1c in 2014


-Continue Glipizide 5 mg PO BID


-Patient was given a one-time dose of Lantus 20 units subcutaneous on admission 

due to starting IV steroids


-Lantus 22 units subcutaneous twice a day while on IV steroids, titrate as 

needed 


   -- Increased to 26 units BID- better control of sugars 


-BSG AC & HS with sliding insulin scale 


-Recheck HgbA1c





HTN


-Continue metoprolol succinate 100 mg PO qd and lisinopril 10 mg PO qd





Chronic diastolic CHF


-Continue furosemide 40 mg PO BID and potassium extended release 20 mEq PO qd





HLD


-Continue pravastatin 10 mg PO qd





Restless leg syndrome


-Continue ropinirole 2 mg PO TID





Depression


-Continue fluoxetine 20 mg PO qd





Anemia


-Continue iron supplement 325 mg PO qd


-Continue B12 supplemental 1000 g PO qd





CKD, stage III, baseline cr. ~1.7


-Stable. Creatinine 1.8 1/16


-Follow with PRP 





DVT prophylaxis:


-GEETHA and SCDs





Dispo:


-Return to home when medically stable


-Likely need PT/OT evaluations prior to discharge

## 2017-01-17 NOTE — HOSPITALIST PROGRESS NOTE
Hospitalist Progress Note


Date of Service


Jan 17, 2017.





Subjective


Pt evaluation today including:  conversation w/ patient, physical exam, chart 

review, lab review, review of studies, review of inpatient medication list


Patient reports feeling about the same.  She states that her breathing still 

has not improved.  She complains of shortness of breath at rest, OMALLEY, a non-

productive cough, and intermittent wheezing.  She states that even prolonged 

talking can make her feel short of breath.  She still has a decreased appetite.

  She does state that her weakness and fatigue has continued to improve.  The 

patient denies fevers, chills, sweats, chest pain, palpitations, claudication, 

nausea, vomiting, abdominal pain, dysuria, hematuria, urinary retention, 

paralysis, weakness, numbness and tingling.





   Additional Comments:


See HPI for pertinent positives and negatives.  All other systems reviewed and 

negative.





Objective


Vital Signs











  Date Time  Temp Pulse Resp B/P Pulse Ox O2 Delivery O2 Flow Rate FiO2


 


1/17/17 08:45      Nasal Cannula 3.0 


 


1/17/17 07:18 36.5 58 18 147/79 98 Nasal Cannula 3.0 


 


1/17/17 00:34 36.6 63 18 161/88 96 Room Air  


 


1/16/17 23:59     98 Nasal Cannula 2.0 


 


1/16/17 16:00     98 Nasal Cannula 3.0 95


 


1/16/17 14:46 36.7 64 18 148/82 98 Nasal Cannula 3.0 











Physical Exam


General Appearance:  WD/WN, no apparent distress, + obese (morbidly obese)


Eyes:  normal inspection, PERRL, EOMI


ENT:  normal ENT inspection, hearing grossly normal, pharynx normal (no thrush)


Neck:  supple, no JVD, trachea midline


Respiratory/Chest:  lungs clear, no respiratory distress, + decreased breath 

sounds, + pertinent finding (very limited air movement, unable to take an 

adequate deep breath)


Cardiovascular:  regular rate, rhythm, no gallop, no murmur


Abdomen:  normal bowel sounds, non tender, soft


Extremities:  non-tender, normal inspection, + swelling (1+ pitting edema)


Neurologic/Psychiatric:  alert, normal mood/affect, oriented x 3


Skin:  normal color, warm/dry, no rash





Laboratory Results





Last 24 Hours








Test


  1/16/17


16:29 1/16/17


19:53 1/17/17


07:30 1/17/17


07:43


 


Bedside Glucose 201 mg/dl  230 mg/dl   205 mg/dl 


 


White Blood Count   6.86 K/uL  


 


Red Blood Count   3.87 M/uL  


 


Hemoglobin   12.5 g/dL  


 


Hematocrit   37.9 %  


 


Mean Corpuscular Volume   97.9 fL  


 


Mean Corpuscular Hemoglobin   32.3 pg  


 


Mean Corpuscular Hemoglobin


Concent 


  


  33.0 g/dl 


  


 


 


Platelet Count   109 K/uL  


 


Mean Platelet Volume   10.8 fL  


 


Neutrophils (%) (Auto)   89.5 %  


 


Lymphocytes (%) (Auto)   6.3 %  


 


Monocytes (%) (Auto)   2.9 %  


 


Eosinophils (%) (Auto)   0.0 %  


 


Basophils (%) (Auto)   0.1 %  


 


Neutrophils # (Auto)   6.14 K/uL  


 


Lymphocytes # (Auto)   0.43 K/uL  


 


Monocytes # (Auto)   0.20 K/uL  


 


Eosinophils # (Auto)   0.00 K/uL  


 


Basophils # (Auto)   0.01 K/uL  


 


RDW Standard Deviation   47.1 fL  


 


RDW Coefficient of Variation   13.3 %  


 


Immature Granulocyte % (Auto)   1.2 %  


 


Immature Granulocyte # (Auto)   0.08 K/uL  


 


Platelet Estimate   DECREASED  


 


Sodium Level   140 mmol/L  


 


Potassium Level    mmol/L  


 


Chloride Level   98 mmol/L  


 


Carbon Dioxide Level   34 mmol/L  


 


Anion Gap   8.0 mmol/L  


 


Blood Urea Nitrogen   45 mg/dl  


 


Creatinine   1.80 mg/dl  


 


Est Creatinine Clear Calc


Drug Dose 


  


  32.0 ml/min 


  


 


 


Estimated GFR (


American) 


  


  31.6 


  


 


 


Estimated GFR (Non-


American 


  


  27.2 


  


 


 


BUN/Creatinine Ratio   25.1  


 


Random Glucose   195 mg/dl  


 


Calcium Level   8.6 mg/dl  


 


Magnesium Level    mg/dl  














Test


  1/17/17


09:15 1/17/17


11:39 


  


 


 


Potassium Level 4.5 mmol/L    


 


Magnesium Level 2.5 mg/dl    


 


Bedside Glucose  170 mg/dl   











Diagnostic Results


Reviewed the following studies and agree with interpretation as follows:








                                                                               

                                                                 


Patient Name: JOSEPH BELLO                               Unit Number:  

[id number]                  


 








 











Dictated: 01/17/17 0830


 


Transcribed: 01/17/17 0830


 


JA


 


Printed Date/Time: [~ rep prt dt]/[~ rep prt tm]








 [~ rep ct labl] - [~ rep ct ivnm]


 





   [facility]


 Radiology Department


 [place] [address] [phone]





 











Dictated: 01/17/17 0830


 


Transcribed: 01/17/17 0830


 


JA


 


Printed Date/Time: [~ rep prt dt]/[~ rep prt tm]








 [~ rep ct labl] - [~ rep ct ivnm]


 





 











Patient:  JOSEPH BELLO [address]  [address] [facility] Rec:  [id number] Address2:  


 


Acct ID:  [id number] [facility] Zip:  [place] [address]


 


YOB: 1945          Sex:  F [facility]


 


Ref Phy:  Marty Tineo D.O. SC: [facility]


 


Att Phy:  Jorge Ceron MD, PhD Report #:  [id number]


 


Meka Phy:  Marty Tineo D.O. Test:  CXR


 


Admit Phy:  Lalito Prajapati M.D. Technician:  JAXON


 


Interpreting Phy:  Eric Alatorre MD Diagnosis:  COPD EXACERBATION, PNEUMONIA


 


Ordering Phy:  Jorge Ceron MD, PhD Service Date:  01/17/17


 


Admit Date: 01/14/1701/14/17 MNE: [id number]


 


 CONF:


 


 DICTATED BY: Eric Alatorre MD]]


 


CC: Marty Tineo D.O. Lin, Daniel Y., MD, PhD


 


 Endcc:











[~ rep ct add3]]


CHEST 2 VIEWS ROUTINE





CLINICAL HISTORY: Pneumonia. COPD.    





COMPARISON STUDY:  Chest radiograph January 14, 2017 per





FINDINGS: No pneumothorax or pleural effusion is present. There is no evidence


of pulmonary edema. Mild cardiomegaly is unchanged. Linear left lower lung


opacity is suggestive of atelectasis. There is no evidence of pulmonary edema.


The appearance of the chest is unchanged. Lumbar spine fusion hardware is


partially imaged. 





IMPRESSION:  





1. No acute cardiopulmonary findings.





2. Linear left basilar opacity suggestive of atelectasis.

















Electronically signed by:  Eric Alatorre M.D.


1/17/2017 8:31 AM





Dictated Date/Time:  1/17/2017 8:30 AM





 The status of this report is Signed.   


 Draft = Not yet reviewed or approved by Radiologist.  


 Signed = Reviewed and approved by Radiologist.


<AttendingPhy>Jorge Ceron MD, PhD</AttendingPhy> <FamilyPhy>Marty Tineo D.O.</FamilyPhy> <PrimaryPhy>Marty Tineo D.O.</PrimaryPhy> <

UnitNumber>[id number]</UnitNumber> <VisitNumber>[id number]</VisitNumber> <

PatientName>JOSEPH BELLO</PatientName> <DateOfBirth>1945</DateOfBirth

> <Location>[facility]</Location> <ServiceDate>01/14/17</ServiceDate> <MNE>ESINDI</

MNE> <OrderingPhy>Jorge Ceron MD, PhD</OrderingPhy> <OrderingPhyMNE>f rep ord 

dr louise</OrderingPhyMNE> <DictatingPhyMNE>f rep dict dr louise</DictatingPhyMNE> <

CCListMNE>f rep ct mne</CCListMNE> <AdmittingPhyMNE>f pt admit dr louise</

AdmittingPhyMNE> <AttendingPhyMNE>f pt attend dr louise</AttendingPhyMNE>


<ConsultingPhyMNE>f pt consult dr louise</ConsultingPhyMNE> <FamilyPhyMNE>f pt fam 

dr louise</FamilyPhyMNE> <OtherPhyMNE>f pt other dr louise</OtherPhyMNE> <

PrimaryPhyMNE>f pt prim care dr louise</PrimaryPhyMNE> <ReferringPhyMNE>f pt 

referring dr louise</ReferringPhyMNE>





Assessment and Plan


71 y/o female with a history of COPD, CHF, DM II, HTN, and HLD who presents to 

the ED on 1/14 with worsening shortness of breath over the past week.  The 

symptoms initially began as a head congestion that then extended into her 

chest.  She went to see her PCP earlier in the week was placed on Levaquin and 

Prednisone on Jan. 10th. She has not had any relief of her symptoms.  She does 

use nebulizers at home without relief. Patient was admitted on 9/3/16 for acute 

COPD exacerbation. 





Acute exacerbation of chronic COPD


-Admit to telemetry


-Received Solu-Medrol 125 mg IV x 1 in ED on 1/14, then Solu-Medrol 40 mg IV 

q8h.  D/C Solu-Medrol on 1/17


-Start Prednisone 40 mg PO BID


-D/C Vancomycin IV and Levofloxacin 500 mg IV 1/14


-D/C Zosyn IV 1/17


-Switch to Levaquin 750 mg PO q48h due to creatinine clearance <49.  Pt. had 

taken 4 days of Levaquin outpatient, but will try again now that flu is being 

managed


-Guaifenesin extended release 600 mg PO BID


-Xopenex/Atrovent nebulizers q6hWA and q2h prn SOB/wheezing


-Continue QVAR inhaler BID 


-Blood cultures NGTD x 2


-Requiring 3L O2 with saturations in high 90s (1/17)- wears 3L at home. 


-Patient follows with Gray Robert PA-C, requested to see him when he comes for 

hospital rounds on 1/17.  Consult order placed, awaiting evaluation


-Sputum culture if produced





Influenza A


-Tamiflu 30 mg PO BID x5 days (started 1/14, PM).


-Droplet precautions 





Diabetes mellitus type 2--last recorded HgbA1c in 2014


-Continue Glipizide 5 mg PO BID


-Patient was given a one-time dose of Lantus 20 units subcutaneous on admission 

due to starting IV steroids


-Lantus 22 units subcutaneous twice a day while on IV steroids, titrate as 

needed 


   -- Increased to 26 units BID- better control of sugars 


-BSG AC & HS with sliding insulin scale 


-Rechecked HgbA1c 8.7 on 1/16.  Discussed with pt that she will need to follow 

up with her PCP as an outpatient regarding better control.  Sugars have 

improved while inpatient on Lantus and Novolog sliding scale, but she does not 

take any insulin at home





HTN


-Continue metoprolol succinate 100 mg PO qd and lisinopril 10 mg PO qd





Chronic diastolic CHF


-Continue furosemide 40 mg PO BID and potassium extended release 20 mEq PO qd





HLD


-Continue pravastatin 10 mg PO qd





Restless leg syndrome


-Continue ropinirole 2 mg PO TID





Depression


-Continue fluoxetine 20 mg PO qd





Anemia


-Continue iron supplement 325 mg PO qd


-Continue B12 supplemental 1000 g PO qd





CKD, stage III, baseline cr. ~1.7


-Stable. Creatinine 1.8 1/17, BUN improving


-Follow with PRP 





DVT prophylaxis:


-GEETHA and SCDs





Dispo:


-Return to home when medically stable


-PT recommends acute inpatient rehab due to deconditioning and pt living alone 

with a full flight of stairs


-Case management following

## 2017-01-17 NOTE — DIAGNOSTIC IMAGING REPORT
CHEST 2 VIEWS ROUTINE



CLINICAL HISTORY: Pneumonia. COPD.    



COMPARISON STUDY:  Chest radiograph January 14, 2017 per



FINDINGS: No pneumothorax or pleural effusion is present. There is no evidence

of pulmonary edema. Mild cardiomegaly is unchanged. Linear left lower lung

opacity is suggestive of atelectasis. There is no evidence of pulmonary edema.

The appearance of the chest is unchanged. Lumbar spine fusion hardware is

partially imaged. 



IMPRESSION:  



1. No acute cardiopulmonary findings.



2. Linear left basilar opacity suggestive of atelectasis.











Electronically signed by:  Eric Alatorre M.D.

1/17/2017 8:31 AM



Dictated Date/Time:  1/17/2017 8:30 AM

## 2017-01-17 NOTE — CLINICAL DOCUMENTATION QUERY
**** CLINICAL DOCUMENTATION QUERY****



Dr. KENNEDY, 



In your clinical opinion is this patient being managed for:

    

                        (  ) Possible/suspected gram negative Pneumonia 

                        (  ) Other explanation of clinical findings (Please Explain)

                        (  ) Unable to determine (Please Define)

                        (  ) Need to Discuss

                        (  ) Not Agree



The medical record reflects the following clinical findings, treatment, and risk factors.  
  



Clinical Indicators: 73 yo female presenting after failed outpatient treatment with 
levaquin and prednisone for a cough.  Vital signs 37.3-96-28, 125/67, O2 sat 93% on 4L, 
CXR with L basilar opacity.

Treatment:IV levaquin, IV vancomycin, IV zosyn, atrovent and xopenex nebs, repeat CXR, O2 
support, 

Risk Factors: age, failed outpatient treatment, COPD with home nebs and inhalers, DM



Please clarify and document your clinical opinion in the progress notes and discharge 
summary. Terms such as "probable", "suspected", "likely", "questionable", "possible", or 
"still to be ruled out" are acceptable. 



*****IF IN AGREEMENT, YOU MUST DOCUMENT ABOVE DIAGNOSTIC STATEMENT IN DAILY PROGRESS NOTES 
AND DISCHARGE SUMMARY. This document is not part of the patient's record.*****

Thank You, Blanquita Mukherjee, [phone]

## 2017-01-18 NOTE — PROGRESS NOTE
Progress Note


pt seen and examed, d/w PA about key points of dignosis and care plan, agreed 

current management, for details please referral to PA's  note 





Influenza A, and Acute exacerbation of chronic COPD, with baseline poor pulm 

function, 


Improving watery slowly, pulmonology input appreciated


Continue steroid, scheduled nebulizer treatment every 4 hours as needed,


Continue antibiotics , will continue totally 7-8 days of antibiotics


Follow up sputum culture


Continue 3L O2 with saturations in high 90s (1/17)- wears 3L at home. 


Planning for rehabilitation in day 1 or 2


Encourage out of bed to chair, incentive spirometry etc.

## 2017-01-18 NOTE — PROGRESS NOTE
DATE: 01/18/2017

 

DATE:  01/18/2017.  

 

PROBLEM LIST:  Includes:

1. Influenza A.

2. Oxygen-dependent chronic obstructive pulmonary disease with exacerbation.

3. Severe chronic obstructive pulmonary disease.

 

SUBJECTIVE:  The patient actually is feeling a little worse today.  She feels

like her her chest is more tight.  She is not able to move air as easily as 

she did yesterday.  She is having a cough.  The cough is dry.  She states it

is starting to break up a little bit.  She is  feeling more short of breath

than she was.  She  states that sitting up in bed today made her a  little

bit short of breath.  She did have some difficulty overnight.  Apparently her

 oxygen tubing became disconnected and her oxygen saturation was down in the

50% to 60% range according to the patient when  they checked it.  Once her

oxygen was hooked up correctly, her saturations came back to the low 90%. 

The patient reports that she still is very rundown and tired.  She still has

no energy.  She is not having any fever that she is aware of.  No chills. 

She does have chest heaviness and tightness which is consistent with where

she has been.  She denies any other difficulties at this time.  No diarrhea. 

No abdominal pain.  No nausea or vomiting.  She is voiding well.  No swelling

in her extremities.

 

OBJECTIVE:

GENERAL:  The patient is a 72-year-old female lying in bed, does not appear

in any acute distress, does get a little bit winded with speaking.  No

accessory muscle use.  She is alert and oriented x3.  Mood is good.  Affect

is good.

VITAL SIGNS:  Temp 36.6, pulse 72, respirations 18, blood pressure is 144/76,

pulse ox is 92% on 3 liters.

HEAD, EYES, EARS, NOSE, AND THROAT:  Normocephalic, atraumatic.  Pupils

equal, round, reactive to light and accommodation.  Extraocular movements are

intact.  Pink moist gingival and buccal mucosa.

NECK:  Supple.  No mass, no adenopathy, no bruit.

CHEST:  Breath sounds actually more diminished today.  No significant

wheezing although air movement is not good today.  Actually less than it was

yesterday.  No rales or rhonchi appreciated.

CARDIOVASCULAR:  Regular rate and rhythm.  No murmurs, gallops or rubs.

ABDOMEN:  Soft, nontender.  No guarding, rigidity or organomegaly.

EXTREMITIES:  No erythema or edema.

 

LABORATORY DATA:  Shows white count 12,000, H\T\H of 13.4 and 41.0, platelet

count 215,000.  BUN 45, creatinine 1.8.  No new imaging.

 

IMPRESSION:  This is a 72-year-old female with severe chronic obstructive

pulmonary disease who is oxygen dependent, who is having  exacerbation to her

breathing as well as testing positive for influenza A.  In reviewing her

medications at this point the patient needs to be on nebulization.  This

patient unfortunately cannot generate enough inspiratory effort to adequately

get inhaled medication from a  metered dose inhaler into her lungs deep

enough to  really benefit her.  She does need nebulization, therefore I will

switch her back over to neb treatment.  We will do nebs q. 4 hours while

awake and every 2 hours as needed, also want to give her 20 mg of Solu-Medrol

IV just as a 1-time dose to see if we can open airway at this point.  As

stated will  switch her over to a nebulized medication q. 4 hours while awake

and q. 2 hours as needed.  Give her Solu-Medrol one time dose today, would

recommend to continue prednisone at 40 mg orally b.i.d. Agree with continuing

oral antibiotic.  Finish out 5-day course of  Tamiflu.  Also put order in for

flutter valve to see if we can get the  secretions mobilizing as they start

to break up.  Will continue to follow through hospitalization.  I do still

think the patient is going to require short term rehabilitation stay such as

at [facility].  We are seeing influenza A in the elderly as it is very

fatiguing for them.  They lose a lot of strength and they are  much higher

fall risk.  At this point, will continue to follow.

## 2017-01-18 NOTE — CONSULTATION REPORT
DATE OF CONSULTATION:  01/17/2017

 

DATE OF CONSULTATION:  01/17/2017.  

 

REASON FOR CONSULTATION:  COPD exacerbation.

 

HISTORY OF PRESENT ILLNESS:  The patient is a 72-year-old female who we

follow in the office.  She was last seen in our office at the end of

September 2016 for a regular followup and was doing well at the  time.  We

follow her in the office for severe COPD as well as pulmonary hypertension,

COPD and is oxygen dependent.  The patient apparently started with symptoms

of difficulty with her breathing about a week ago.  She contacted the office

and had antibiotic and prednisone called in for her.  She had been on that

about 4 days and was not seeing  any improvement.  She is becoming a little

bit more short of breath and symptoms did worsen a little bit so she

presented to [facility] emergency room where she was

admitted on 01/14/2017.  In the ER they did do flu titers and she was

positive for influenza A.  She did have chest x-ray done which did not show

any evidence of pneumonia, did show   emphysematous change as well as

bibasilar atelectasis and scarring. The patient reports that her symptoms

were such that she was coughing.  She was coughing up a little bit of yellow

mucus, although not getting a lot.  She was having wheezing.  She was more

short of breath.  She was having to use her oxygen at a little bit higher

rate than her normal 2-3 liters per minute.  She felt that she did have a low

grade fever but she was not completely certain.  She did have wheezing.  She

did have increased dyspnea on exertion.  She did have some headache.  She had

some body aches and chills.  She did not really have any GI symptoms.  No

nausea or vomiting, no indigestion or heartburn.  She states that her legs

may have been a little bit more swollen than normal, although it was not

significant.  She states that she felt very fatigued and run down and had no

energy.  She denied any chest pain, no palpitations.  No pleuritic chest

pain.  She denied any vomiting, no diarrhea.  She denied any dysuria.  She

reports that prior to this she had been in her normal state of health.  As

stated earlier, we last saw the patient in the office in September of 2016. 

Reviewing her records, her last PFT was 2013 and showed a forced vital

capacity of 1.77 liters which was 65% of predicted, an FEV1 was 0.83 liters

which is 38% predicted and FEV1/FVC ratio of 57% of predicted.  Her DLCO was

39% of predicted.  Interpretation was severe COPD with no response to

bronchodilation.  The patient also has pulmonary hypertension for which she

underwent a right heart catheterization in August 2015 that showed peak

pulmonary artery pressure at the time of 52.  The patient also follows with

cardiology for some congestive heart failure.  The patient reports since

being in the hospital her symptoms have improved.  She is not as short of

breath as she was.  She still is feeling very rundown and tired.  She denies

any other concerns or problems at this time.  When I saw her physical therapy

was in with her and discussion was being made about possible transition to

[facility].

 

PAST MEDICAL HISTORY:  Includes pulmonary hypertension with peak pulmonary

artery pressure of 52 in August 2015, severe COPD which is oxygen dependent,

chronic diastolic failure, diabetes mellitus, depression, chronic kidney

disease stage III, hyperlipidemia, chronic edema, hypertension, restless leg

syndrome, chronic back pain, vitamin D deficiency.

 

PAST SURGICAL HISTORY:  Includes right heart catheterization, multiple

bronchoscopy, cholecystectomy, spinal arthrodesis, tonsillectomy and total

knee replacement.

 

FAMILY HISTORY:  Includes hypertension, breast cancer in her daughter.

 

SOCIAL HISTORY:  The patient is a former smoker having smoked 1 pack a day

for 30 years.  She quit several years ago.  She is retired.

 

CURRENT MEDICATIONS:  Per hospitalist history and physical include QVAR 2

puffs twice daily, vitamin B12 1000 mcg p.o. daily, iron sulfate 325 mg

daily, Prozac 20 mg daily, Lasix 20 mg daily, glipizide 5 mg twice daily,

DuoNeb anywhere from 1 to 4 times a day, lisinopril 10 mg daily, Toprol 100

mg daily, pravastatin 10 mg daily, Requip 2 mg 3 times daily, Aldactone 12.5

mg daily, tramadol 50 mg q. 6 hours as needed for pain.

 

ALLERGIES:  ATROPINE AND DOBUTAMINE.

 

REVIEW OF SYSTEMS:  As above, otherwise unremarkable.

 

PHYSICAL EXAMINATION:

GENERAL:  The patient is a 72-year-old female sitting at bedside, does not

appear in any acute respiratory distress, really does not appear in any

distress in general.  She is alert and oriented x3.  Mood is good.  Affect is

good.

VITAL SIGNS:  Temp 36.5, pulse 58, respirations 18, blood pressure is 147/79,

pulse ox 98% on 3 liters.

HEAD, EYES, EARS, NOSE, AND THROAT:  Normocephalic, atraumatic.  Pupils

equal, round and reactive to light and accommodation.  Extraocular movements

are intact.  Pink moist gingival and buccal mucosa.

NECK:  Supple.  No mass.  No adenopathy.  No bruit.

CHEST:  The patient has diminished breath sounds bilaterally.  Few faint

wheezes, although difficult to ascertain due to limited airflow.  No rale or

rhonchi noted at this time.

CARDIOVASCULAR:  Regular rate and rhythm.  No murmurs, gallops or rubs.

ABDOMEN:  Bowel sounds are present.  Abdomen soft, nontender.  No guarding,

rigidity or organomegaly.

EXTREMITIES:  The patient has 1+ pitting edema, no erythema, no cyanosis or

clubbing.

NEUROLOGIC:  Cranial nerves II through XII are intact.  No focal deficit

noted.

 

LABORATORY DATA:  Shows a white count of 6,000, H\T\H 12.5 and 37.9, platelet

count 109,000.  BUN 45, creatinine 1.8.  Flu cultures positive for influenza

A.  Blood cultures negative to date.  Chest x-ray shows no acute findings.

 

IMPRESSION:  This is a 72-year-old female who we follow in the office for

severe COPD as well as pulmonary hypertension who was admitted through the

Emergency Room on 01/14/2017 with increased difficulty with her breathing. 

She was found to have influenza type A via  nasal swab.  At this point, I

agree with covering her with Tamiflu.  Would recommend cover for the 5 days

as  recommended.  I also agree with the antibiotic choice of hospitalist team

to cover for possible bacterial exacerbation of her COPD with her

immunocompromised state being what it is with having the flu.  Continue

oxygen.  The patient unfortunately is on oxygen continuously and wears 3

liters via nasal cannula.  This will be continued following hospitalization. 

Agree with hospice recommendation try to get sputum culture although the 

patient usually has limited mucus production and difficulty expectorating any

mucus.  May want to consider  flutter valve to see if we can help with this. 

At this point, will continue to follow up with the patient.

 

 

LIZBETH

## 2017-01-18 NOTE — HOSPITALIST PROGRESS NOTE
Hospitalist Progress Note


Date of Service


Jan 18, 2017.





Subjective


Pt evaluation today including:  conversation w/ patient, physical exam, chart 

review, lab review, conversation w/ consultant (spoke with Gray Robert PA-C), 

review of inpatient medication list


PO Intake:  Decreased appetite, tolerating PO diet


Voiding:  no voiding problems


Patient reports having difficulty last night.  She states that she had been 

experiencing worsening shortness of breath for a few hours before she summoned 

the nurse, who discovered that her oxygen had been accidentally disconnected.  

Per the patient, her pulse ox was in the 50s at that time, but it did return to 

the low 90s once her oxygen was reconnected.  Currently, her breathing feels 

about the same as it has been.  She still complains of SOB, OMALLEY, non-productive 

cough, and intermittent wheezing.  She complains of decreased appetite and 

states that her food has been tasting "off" today.  The patient denies fevers, 

chills, sweats, chest pain, palpitations, claudication, nausea, vomiting, 

abdominal pain, dysuria, hematuria, urinary retention, paralysis, weakness, 

numbness and tingling.





   Additional Comments:


See HPI for pertinent positives and negatives.  All other systems reviewed and 

negative.





Objective


Vital Signs











  Date Time  Temp Pulse Resp B/P Pulse Ox O2 Delivery O2 Flow Rate FiO2


 


1/18/17 10:33  80 24  90 Nasal Cannula 3.0 


 


1/18/17 08:45      Nasal Cannula 3.0 


 


1/18/17 08:27 36.6 72 18 144/76 92 Nasal Cannula 3.0 


 


1/18/17 00:13 36.7 69 18 152/82 93  3.0 


 


1/18/17 00:00      Nasal Cannula 3.0 


 


1/17/17 16:20     94 Nasal Cannula 3.0 


 


1/17/17 15:45     94 Nasal Cannula 3.0 


 


1/17/17 15:01 36.7 64 18 149/82 92 Nasal Cannula 3.0 











Physical Exam


General Appearance:  WD/WN, no apparent distress, + obese (morbidly obese)


Eyes:  normal inspection, PERRL, EOMI


ENT:  normal ENT inspection, hearing grossly normal, + pertinent finding (thrush

)


Neck:  supple, no JVD, trachea midline


Respiratory/Chest:  no respiratory distress, + decreased breath sounds (very 

poor air movement, decreased inspirations compared to yesterday), + wheezing


Cardiovascular:  regular rate, rhythm, no gallop, no murmur


Abdomen:  normal bowel sounds, non tender, soft


Extremities:  normal range of motion, non-tender, + swelling (1+ pitting edema)


Neurologic/Psychiatric:  alert, normal mood/affect, oriented x 3


Skin:  normal color, warm/dry, no rash





Laboratory Results





Last 24 Hours








Test


  1/17/17


16:34 1/17/17


20:57 1/18/17


05:07 1/18/17


05:10


 


Bedside Glucose 160 mg/dl  115 mg/dl  72 mg/dl  


 


White Blood Count    12.21 K/uL 


 


Red Blood Count    4.24 M/uL 


 


Hemoglobin    13.4 g/dL 


 


Hematocrit    41.0 % 


 


Mean Corpuscular Volume    96.7 fL 


 


Mean Corpuscular Hemoglobin    31.6 pg 


 


Mean Corpuscular Hemoglobin


Concent 


  


  


  32.7 g/dl 


 


 


Platelet Count    215 K/uL 


 


Mean Platelet Volume    10.6 fL 


 


Neutrophils (%) (Auto)    82.0 % 


 


Lymphocytes (%) (Auto)    9.0 % 


 


Monocytes (%) (Auto)    7.5 % 


 


Eosinophils (%) (Auto)    0.0 % 


 


Basophils (%) (Auto)    0.1 % 


 


Neutrophils # (Auto)    10.02 K/uL 


 


Lymphocytes # (Auto)    1.10 K/uL 


 


Monocytes # (Auto)    0.91 K/uL 


 


Eosinophils # (Auto)    0.00 K/uL 


 


Basophils # (Auto)    0.01 K/uL 


 


RDW Standard Deviation    45.4 fL 


 


RDW Coefficient of Variation    13.0 % 


 


Immature Granulocyte % (Auto)    1.4 % 


 


Immature Granulocyte # (Auto)    0.17 K/uL 


 


Sodium Level    142 mmol/L 


 


Potassium Level     mmol/L 


 


Chloride Level    97 mmol/L 


 


Carbon Dioxide Level    40 mmol/L 


 


Anion Gap    5.0 mmol/L 


 


Blood Urea Nitrogen    45 mg/dl 


 


Creatinine    1.60 mg/dl 


 


Est Creatinine Clear Calc


Drug Dose 


  


  


  36.0 ml/min 


 


 


Estimated GFR (


American) 


  


  


  36.4 


 


 


Estimated GFR (Non-


American 


  


  


  31.4 


 


 


BUN/Creatinine Ratio    28.0 


 


Random Glucose    67 mg/dl 


 


Calcium Level    9.0 mg/dl 














Test


  1/18/17


07:51 1/18/17


08:10 1/18/17


11:55 


 


 


Bedside Glucose 102 mg/dl   83 mg/dl  


 


Potassium Level  3.8 mmol/L   











Assessment and Plan


73 y/o female with a history of COPD, CHF, DM II, HTN, and HLD who presents to 

the ED on 1/14 with worsening shortness of breath over the past week.  The 

symptoms initially began as a head congestion that then extended into her 

chest.  She went to see her PCP earlier in the week was placed on Levaquin and 

Prednisone on Jan. 10th. She has not had any relief of her symptoms.  She does 

use nebulizers at home without relief. Patient was admitted on 9/3/16 for acute 

COPD exacerbation. 





Acute exacerbation of chronic COPD


-Admit to telemetry


-Received Solu-Medrol 125 mg IV x 1 in ED on 1/14, then Solu-Medrol 40 mg IV 

q8h.  D/C Solu-Medrol on 1/17


-Continue Prednisone 40 mg PO BID


-D/C Vancomycin IV and Levofloxacin 500 mg IV 1/14


-D/C Zosyn IV 1/17


-Switch to Levaquin 750 mg PO q48h due to creatinine clearance <49.  Pt. had 

taken 4 days of Levaquin outpatient, but will try again now that flu is being 

managed


-Guaifenesin extended release 600 mg PO BID


-Xopenex/Atrovent nebulizers q4hWA and q2h prn SOB/wheezing


-Continue QVAR inhaler BID 


-Blood cultures NGTD x 2


-Requiring 3L O2 with saturations in low 90s (1/18)- wears 3L at home. 


-Pulmonology consulted, appreciate recs:  spoke with Gray Robert PA-C, who 

states that patient has very poor baseline and is not much worse off than her 

normal.  She will definitely need nebulizer treatments as she does not have the 

ability to adequately draw in MDI medications.  He gave her a 1 time dose of 

Solu-Medrol 20 mg IV to help open her up more, otherwise can continue 

prednisone 40 mg PO BID.  He recommended a very slow taper, in a few days she 

can go down to 60 mg qd and then taper 5 mg every 4-5 days.  Also recommended 

trying a flutter valve to help clear her mucus.


-Sputum culture if produced





Influenza A


-Tamiflu 30 mg PO BID x5 days (started 1/15, AM), last dose will be evening of 1 /19


-Droplet precautions 





Oral candidiasis


-Start nystatin 5 ml PO QID x 14 days





Diabetes mellitus type 2--last recorded HgbA1c in 2014


-Continue Glipizide 5 mg PO BID


-Patient was given a one-time dose of Lantus 20 units subcutaneous on admission 

due to starting IV steroids


-Lantus 22 units subcutaneous twice a day while on IV steroids, titrate as 

needed 


   -- Increased to 26 units BID- better control of sugars 


-BSG AC & HS with sliding insulin scale 


-Rechecked HgbA1c 8.7 on 1/16.  Discussed with pt that she will need to follow 

up with her PCP as an outpatient regarding better control.  Sugars have 

improved while inpatient on Lantus and Novolog sliding scale, but she does not 

take any insulin at home.  As steroids are tapered down, may be able to 

decrease insulin.





HTN


-Continue metoprolol succinate 100 mg PO qd and lisinopril 10 mg PO qd





Chronic diastolic CHF


-Continue furosemide 40 mg PO BID and potassium extended release 20 mEq PO qd





HLD


-Continue pravastatin 10 mg PO qd





Restless leg syndrome


-Continue ropinirole 2 mg PO TID





Depression


-Continue fluoxetine 20 mg PO qd





Anemia


-Continue iron supplement 325 mg PO qd


-Continue B12 supplemental 1000 g PO qd





CKD, stage III, baseline cr. ~1.7


-Stable. Creatinine 1.6 1/18, BUN stable


-Follow with PRP 





DVT prophylaxis:


-GEETHA and SCDs





Dispo:


-Return to home when medically stable


-PT recommends acute inpatient rehab due to deconditioning and pt living alone 

with a full flight of stairs


-Case management following, will place referral to [facility] per patient's 

request

## 2017-01-19 NOTE — PROGRESS NOTE
Subjective


Date of Service:


Jan 19, 2017.


Subjective


Pt evaluation today including:  conversation w/ patient, conversation w/ family

, physical exam, chart review, lab review, review of studies, conversation w/ 

consultant, review of inpatient medication list


Still feeling generalized weakness,SOB WHEN out of bed to the rest room,  

associated with the some wheezing





No fever no cough, no chest pain





Problem List


Medical Problems:


(1) Cellulitis


Status: Acute  





(2) COPD exacerbation


Status: Acute  





(3) COPD exacerbation


Status: Acute  





(4) Hypoxia


Status: Acute  





(5) SOB (shortness of breath)


Status: Acute  








Review of Systems


Constitutional:  + fatigue, + weakness, No chills, No fever, No problem reported

, No sweats, No weight loss


Eyes:  No diplopia, No discharge, No eye pain, No redness, No worsening of 

vision


ENT:  No dental problems, No hearing loss, No nasal symptoms, No sore throat, 

No tinnitus, No trouble swallowing, No unusual epistaxis


Respiratory:  + dyspnea on exertion, + shortness of breath, No cough, No 

dyspnea at rest, No hemoptysis, No sputum, No wheezing


Cardiac:  No PND, No chest pain, No claudication, No edema, No orthopnea, No 

palpitations


Abdomen:  No constipation, No diarrhea, No nausea, No pain, No vomiting


Musculoskeletal:  No calf pain, No joint pain, No muscle pain, No swelling


Female :  No abnormal vaginal bleeding, No dysuria, No hematuria, No 

incontinence, No urinary frequency, No vaginal discharge


Neurologic:  No balance problems, No memory loss, No numbness/tingling, No 

paralysis, No vertigo, No weakness


Psychiatric:  No anhedonism, No anxiety, No depression symptoms, No insomnia, 

No substance abuse


Heme:  No abnormal bleeding/bruising, No clotting problems, No night sweats, No 

swollen lymph nodes


Endo:  No excessive thirst, No excessive urination, No fatigue


Skin:  No bleeding, No color change, No itch, No new/changing skin lesions, No 

rash





Objective


Vital Signs











  Date Time  Temp Pulse Resp B/P Pulse Ox O2 Delivery O2 Flow Rate FiO2


 


1/19/17 11:07  89 20  97 Nasal Cannula 3.0 


 


1/19/17 08:00      Nasal Cannula 3.0 


 


1/19/17 07:39 36.6 52 20 177/90 98 Nasal Cannula 3.0 


 


1/19/17 07:32  52 20  97 Nasal Cannula 3.0 98


 


1/19/17 00:00      Nasal Cannula 3.0 


 


1/18/17 23:52 36.5 81 18 153/73 94 Room Air  


 


1/18/17 21:00  64  177/97    


 


1/18/17 19:15      Nasal Cannula 3.0 


 


1/18/17 19:06  71 20  97 Nasal Cannula 3.0 


 


1/18/17 16:54      Nasal Cannula 3.0 


 


1/18/17 15:54 36.7 71 17 182/96 98 Nasal Cannula 3.0 





      Humidified Air  


 


1/18/17 15:16  65 20  97 Nasal Cannula 3.0 











Physical Exam


General Appearance:  + obese


Eyes:  normal inspection, PERRL, EOMI, sclerae normal


ENT:  normal ENT inspection, hearing grossly normal, pharynx normal


Neck:  supple, no adenopathy, thyroid normal, no JVD, no carotid bruits, 

trachea midline


Respiratory/Chest:  chest non-tender, normal breath sounds, no respiratory 

distress, no accessory muscle use, + decreased breath sounds, + wheezing (mild 

right Lower lung)


Cardiovascular:  regular rate, rhythm, no edema, no gallop, no JVD, no murmur


Abdomen:  normal bowel sounds, non tender, soft, no organomegaly, no pulsatile 

mass


Extremities:  normal range of motion, non-tender, normal inspection, no pedal 

edema, no calf tenderness, normal capillary refill, pelvis stable


Neurologic/Psychiatric:  CNs II-XII nml as tested, no motor/sensory deficits, 

alert, normal mood/affect, oriented x 3


Skin:  normal color, warm/dry, no rash


Lymphatic:  no adenopathy





Laboratory Results





Last 24 Hours








Test


  1/18/17


16:29 1/18/17


16:30 1/18/17


16:53 1/18/17


20:10


 


Bedside Glucose 69 mg/dl  68 mg/dl  117 mg/dl  208 mg/dl 














Test


  1/19/17


05:20 1/19/17


06:58 1/19/17


07:33 1/19/17


12:09


 


White Blood Count 5.98 K/uL    


 


Red Blood Count 3.73 M/uL    


 


Hemoglobin 11.9 g/dL    


 


Hematocrit 36.6 %    


 


Mean Corpuscular Volume 98.1 fL    


 


Mean Corpuscular Hemoglobin 31.9 pg    


 


Mean Corpuscular Hemoglobin


Concent 32.5 g/dl 


  


  


  


 


 


Platelet Count 119 K/uL    


 


Mean Platelet Volume 10.4 fL    


 


Neutrophils (%) (Auto) 84.6 %    


 


Lymphocytes (%) (Auto) 8.0 %    


 


Monocytes (%) (Auto) 5.9 %    


 


Eosinophils (%) (Auto) 0.0 %    


 


Basophils (%) (Auto) 0.0 %    


 


Neutrophils # (Auto) 5.06 K/uL    


 


Lymphocytes # (Auto) 0.48 K/uL    


 


Monocytes # (Auto) 0.35 K/uL    


 


Eosinophils # (Auto) 0.00 K/uL    


 


Basophils # (Auto) 0.00 K/uL    


 


RDW Standard Deviation 46.7 fL    


 


RDW Coefficient of Variation 13.0 %    


 


Immature Granulocyte % (Auto) 1.5 %    


 


Immature Granulocyte # (Auto) 0.09 K/uL    


 


Sodium Level 141 mmol/L    


 


Potassium Level  mmol/L  4.2 mmol/L   


 


Chloride Level 98 mmol/L    


 


Carbon Dioxide Level 36 mmol/L    


 


Anion Gap 7.0 mmol/L    


 


Blood Urea Nitrogen 43 mg/dl    


 


Creatinine 1.40 mg/dl    


 


Est Creatinine Clear Calc


Drug Dose 40.9 ml/min 


  


  


  


 


 


Estimated GFR (


American) 42.8 


  


  


  


 


 


Estimated GFR (Non-


American 36.9 


  


  


  


 


 


BUN/Creatinine Ratio 30.6    


 


Random Glucose 171 mg/dl    


 


Calcium Level 8.7 mg/dl    


 


Bedside Glucose   146 mg/dl  139 mg/dl 











Assessment and Plan


72-year-old white female admitted on





Influenza A, and Acute exacerbation of chronic COPD, with baseline poor pulm 

function, 


Influenza A, is complete totally 5 days treatment


Acute exacerbation of chronic COPD, chronic respiratory failure home O2 

dependent with baseline poor pulm function, 


Improving very  slowly, pulmonology input appreciated


Continue steroid, scheduled nebulizer treatment every 4 hours as needed,


Continue antibiotics , will continue totally 7-8 days of antibiotics


Follow up sputum culture


Continue 3L O2 with saturations in high 90s (1/17)- wears 3L at home. 





Oral candidiasis, Start nystatin 5 ml PO QID x 14 days





Diabetes mellitus type 2--last recorded HgbA1c in 2014


-Continue Glipizide 5 mg PO BID


-Lantus 22 units subcutaneous twice a day while on IV steroids, titrate as 

needed 


   -- Increased to 26 units BID- better control of sugars 


-Rechecked HgbA1c 8.7 on 1/16.  Discussed with pt that she will need to follow 

up with her PCP as an outpatient regarding better control.  Sugars have 

improved while inpatient on Lantus and Novolog sliding scale, but she does not 

take any insulin at home.  As steroids are tapered down, may be able to 

decrease insulin.





HTN, Chronic diastolic CHF, HLD, Restless leg syndrome, Depression, Anemia: 

Stable continue current medication


CKD, stage III, baseline cr. ~1.7: Stable





Planning for rehabilitation tomorrow





Encourage out of bed to chair, incentive spirometry etc.





GI and DVT prophylaxis


Continued [facility] stay due to:  home environment unsafe for pt


Discharge planning:  rehab hospital

## 2017-01-19 NOTE — PULMONARY PROGRESS NOTE
DATE: 01/19/2017

 

HISTORY OF PRESENT ILLNESS:  The patient is comfortable this morning.  She

states she is very weak.  She is able to ambulate in the hallways twice

yesterday, did developed dyspnea with ambulation.  She states she would have

difficulty with ambulating at home.  She is tolerating the nebulizer very

well.  She is not on a long acting bronchodilator.  She is on prednisone 40

mg b.i.d.  She states she is better than at the time of admission.  She

continues to have a bit of cough which is nonproductive with some anorexia. 

She slept fairly well last night.  According to nurses' notes, she was

ambulating independently to the bathroom without difficulty or significant

dyspnea.  She is able to ambulate in the hallway without difficulty except

for dyspnea with exertion, but states she is weak.

 

PHYSICAL EXAMINATION:

VITAL SIGNS:  Stable.  Blood pressure 153/73, oxygen saturation 97% on 3

liters.  Her weight is 105 kilograms and that has been stable.  Nurses' notes

and medications reviewed.

HEENT AND NECK:  Unremarkable except for a small posterior pharynx and large

pendulous uvula abutting up against the posterior pharyngeal wall and a large

tongue.  Mandibular exam unremarkable.  No neck vein distention or HJR.  No

adenopathy is noted.  Thyroid nonpalpable.

HEART:  Regular rate and rhythm.  No murmurs are heard.

LUNGS:  Reveal some mild wheezing with expiration bilaterally with decreased

breath sounds.  Scattered rhonchi are noted as well.  There is no fremitus or

dullness to percussion.

ABDOMEN:  Soft and obese, nontender.

EXTREMITIES:  She has no cyanosis, clubbing or edema.

 

IMAGING DATA:  Chest x-ray revealed a bit of linear plate-like atelectasis at

the left lower lobe area.  Otherwise, it looked good.  Evidence of previous

surgery of the lumbar spine was noted.

 

LABORATORY DATA:  White count 5.98, hemoglobin 11.9 with a platelet count of

119,000 which is slightly reduced.  PRP shows a CO2 of 40, BUN of 45,

creatinine 1.6.  Sugars have been in the 200-230 range.  Magnesium was 2.5. 

Hepatitis B core antibody and hepatitis C antibodies are negative.  The

influenza antigen for influenza A was positive.  Blood cultures so far are

unremarkable.

 

IMPRESSION:

1.  Chronic obstructive lung disease with exacerbation.

2.  Influenza A.

3.  Chronic kidney disease.

4.  Diabetes mellitus.

 

RECOMMENDATIONS:

1.  Decrease prednisone at 60 mg daily.

2.  Add Symbicort 160/4.5 two puffs b.i.d. and I believe the inhaled

steroids, QVAR 80 mcg could be held at this point.

3.  Continue on the nebulizer with Atrovent and albuterol 4 times a day and

then q. 4 hours p.r.n.

4.  Continue with increasing the patient's activity.  Overall, she seems to

be improving nicely.  At this point, I will continue with the CPAP each time

she sleeps.

 

 

 

 

ROMIED

## 2017-01-20 NOTE — PROGRESS NOTE
DATE: 01/20/2017

 

She is able to ambulate in the room without difficulty.  She is working on

the computer this morning at 6:00 and states she feels much better.  She

denies cough or wheezing or chest pain.  Slept very well last night. 

According to nurses' notes she did well yesterday afternoon after being

transferred to another room.  She states throughout the night her wheezing

has improved.

 

PHYSICAL EXAMINATION:

VITAL SIGNS:  Stable.  She remains hypertensive, blood pressure 178/105 at

midnight.  She is afebrile, pulse is 70 and regular, respiratory rate is 16

this morning, oxygen saturation 94% on room air.  Weight 103.4 kilograms. 

She was 106 kilograms on the 15th which is probably relatively stable. 

Nurses' notes and meds reviewed.

HEENT:  Unremarkable except for a small posterior pharynx and a small nose. 

No thrush is noted.  No adenopathy is noted.  Carotid upstroke normal, no

bruits auscultated.  Expansion of the thorax is good with deep inspiration.

HEART:  Regular rate and rhythm.  No murmurs are auscultated.  No gallops are

heard.

LUNGS:  Clear with decreased breath sounds bilaterally.  With forced

expiration, she has some very mild wheezing right at the end of expiration.

ABDOMEN:  Soft and nontender.  She has no cyanosis, clubbing or edema.

 

Chest x-ray revealed minimal atelectatic changes at the left base.  EKG was

normal.  Hemoglobin 11.9 yesterday.  Sugars have been in the 138-146 range

although on the 18th she did have some hyperglycemia.  BUN is 43, creatinine

of 1.4, CO2 is elevated at 36.  Influenza A is positive.

 

IMPRESSION:

1.  Chronic obstructive lung disease with exacerbation.

2.  Influenza A.

3.  Obesity.

 

RECOMMENDATIONS:

1.  At this point, I think the Levaquin probably could be discontinued.

2.  Recommend decreasing the prednisone to 40 mg daily with a taper over

about one week.

3.  Increase activity as much as possible.

4.  May consider holding the Lasix because of the elevated BUN and creatinine

and adjusting the Aldactone appropriately.

5.  Continue on the Xopenex with Atrovent 4 times a day or q. 4 hours p.r.n. 

I believe changing her to Symbicort 160/4.5 two puffs b.i.d. with a mouth

rinse and stopping the Qvar would be appropriate as well.  She can follow up

with Gray Robert PA-C next week.  Overall, she seems to be doing well.  She

is anxious to get out and do some ambulating in the hallway today as well.

## 2017-01-20 NOTE — DISCHARGE SUMMARY
Discharge Summary


Admission Date:


Jan 14, 2017 at 03:55


Discharge Date:  Jan 20, 2017


Discharge Disposition:  Skilled nursing facility ([facility])


Principal Diagnosis:  COPD exacerbation, Influenza A


Immunizations:  


   Have You Had Influenza Vaccine:  Yes


   Influenza Vaccine Date:  Oct 17, 2013


   History of Tetanus Vaccine?:  Yes


   Tetanus Immunization Date:  Oct 8, 2013


   History of Pneumococcal:  Yes


   Pneumococcal Date:  Nov 17, 2010


   History of Hepatitis B Vaccine:  No


Medication Reconciliation


New Medications:  


Prednisone (Prednisone) 10 Mg Tab


10 MG PO UD for 48 Days, #156 TABS


Take 6 tabs (60 mg) daily for 4 days. Then decrease dose by 5 mg


 daily for 4 days. Decrease dose by 5 mg every 4 days.


Nystatin (Nystatin) 5 Ml Susp


5 ML PO QID for 11 Days, #220 ML


Take 5 ml (1 teaspoon) by mouth four times a day.


 


Changed Medications:  


Ipratropium-Albuterol (Duoneb) 3 Ml Nebu


1 TREATMENT INH Q6HWA for 30 Days, #150 UNITS (Changed from: DAILY; MIDDAY)


Use 1 neb treatment every 6 hours while awake.  May use


 every 2 hours as needed for shortness of breath & wheezing.


 


Continued Medications:  


Amoxicillin (Amoxil) 500 Mg Cap


500 MG PO UD PRN for DENTAL WORK





Cyanocobalamin (Vitamin B12 500MCG) 500 Mcg Tab


1000 MCG PO DAILY





Ferrous Sulfate (Iron Supplement) 325 Mg Tab


325 MG PO DAILY





Fluoxetine Hcl (Prozac) 20 Mg Cap


20 MG PO QAM





Furosemide (Lasix) 20 Mg Tab


20 MG PO DAILY, TAB





Glipizide (Glipizide) 5 Mg Tab


5 MG PO BID





Lisinopril (Prinivil) 10 Mg Tab


10 MG PO DAILY





Metoprolol Succ (Toprol Xl) (Toprol-Xl ) 100 Mg Tabcr


100 MG PO DAILY





Pravastatin Sodium (Pravastatin Sodium) 10 Mg Tab


10 MG PO DAILY





Ropinirole (Requip) 2 Mg Tab


2 MG PO TID





Spironolactone (Aldactone) 25 Mg Tab


12.5 MG PO QAM





Tramadol HCl (Tramadol HCl) 50 Mg Tab


50 MG PO Q6 PRN for Pain





 


Discontinued Medications:  


Beclomethasone Dipropionate (Qvar) 80 Mcg/  Aer


2 PUFFS INH BID, 3 Refills





Levofloxacin (Levaquin) 500 Mg Tab


500 MG PO DAILY, #10











Referrals At Discharge


Follow up Referrals:  


Pulmonologist Referral - Within 1 Week with Gray Robert PA-C








Discharge Exam


See Hospitalist Progress Note 1/20/17 for HPI, ROS, and physical exam.





Hospital Course


73 y/o female with a history of COPD, CHF, DM II, HTN, and HLD who presents to 

the ED on 1/14 with worsening shortness of breath over the past week.  The 

symptoms initially began as a head congestion that then extended into her 

chest.  She went to see her PCP earlier in the week was placed on Levaquin and 

Prednisone on Jan. 10th. She has not had any relief of her symptoms.  She does 

use nebulizers at home without relief. Patient was admitted on 9/3/16 for acute 

COPD exacerbation. 





Acute exacerbation of chronic COPD


-Admit to telemetry.  Transferred to med/surg 1/19 as she has been stable


-Received Solu-Medrol 125 mg IV x 1 in ED on 1/14, then Solu-Medrol 40 mg IV 

q8h.  D/C Solu-Medrol on 1/17


-Decrease Prednisone to 60 mg PO qd 1/20.  Will taper Prednisone as per Gray Robert's recommendations:  60 mg PO qd x 4 days, then decrease by 5 mg every 4 

days until completion.


-D/C Vancomycin IV and Levofloxacin 500 mg IV 1/14


-D/C Zosyn IV 1/17


-Switched to Levaquin 750 mg PO q48h due to creatinine clearance <49.  Pt. had 

taken 4 days of Levaquin outpatient, but will try again now that flu is being 

managed


-Levaquin D/C'd 1/20 as pt has received 7 days of abx


-Guaifenesin extended release 600 mg PO BID.  D/C upon discharge, not effective.


-Xopenex/Atrovent nebulizers q4hWA and q2h prn SOB/wheezing


-Hold QVAR as per pulm recs


-Blood cultures final no growth x 2


-Requiring 3L O2 with saturations in mid 90s (1/20)- wears 3L at home. 


-Pulmonology consulted, appreciate recs:  spoke with Gray Robert PA-C, who 

states that patient has very poor baseline and is not much worse off than her 

normal.  She will definitely need nebulizer treatments as she does not have the 

ability to adequately draw in MDI medications.  He gave her a 1 time dose of 

Solu-Medrol 20 mg IV to help open her up more, otherwise can continue 

prednisone 40 mg PO BID.  He recommended a very slow taper, in a few days she 

can go down to 60 mg qd and then taper 5 mg every 4-5 days.  Also recommended 

trying a flutter valve to help clear her mucus.


-Dr. Mcneal of pulmonology recs decreasing Prednisone, stopping abx, holding QVAR 

and adding Symbicort; however, pt has tried Symbicort in the past and it did 

not help.


-Sputum culture if produced





Influenza A


-Tamiflu 30 mg PO BID x5 days (started 1/15, AM), last dose will be evening of 1 /19--all doses taken, course completed 





Oral candidiasis


-Continue nystatin 5 ml PO QID x 14 days, day #3.  Script for remaining 11 days 

given at discharge.





Diabetes mellitus type 2--last recorded HgbA1c in 2014


-Continue Glipizide 5 mg PO BID


-Patient was given a one-time dose of Lantus 20 units subcutaneous on admission 

due to starting IV steroids


-Lantus 22 units subcutaneous twice a day while on IV steroids, titrate as 

needed 


   -- Increased to 26 units BID- better control of sugars 


-BSG AC & HS with sliding insulin scale 


-Rechecked HgbA1c 8.7 on 1/16.  Discussed with pt that she will need to follow 

up with her PCP as an outpatient regarding better control.  Sugars have 

improved while inpatient on Lantus and Novolog sliding scale, but she does not 

take any insulin at home.  As steroids are tapered down, may be able to 

decrease insulin.





HTN


-Continue metoprolol succinate 100 mg PO qd and lisinopril 10 mg PO qd





Chronic diastolic CHF


-Continue furosemide 40 mg PO BID and potassium extended release 20 mEq PO qd





HLD


-Continue pravastatin 10 mg PO qd





Restless leg syndrome


-Continue ropinirole 2 mg PO TID





Depression


-Continue fluoxetine 20 mg PO qd





Anemia


-Continue iron supplement 325 mg PO qd


-Continue B12 supplemental 1000 g PO qd





CKD, stage III, baseline cr. ~1.7


-Stable. Creatinine 1.4 1/20, BUN stable


-Follow with PRP 





DVT prophylaxis:


-GEETHA and SCDs





Dispo:


-Return to home when medically stable


-PT recommends acute inpatient rehab due to deconditioning and pt living alone 

with a full flight of stairs


-Insurance denied [facility].  CM talked to pt regarding SNF options, she is 

agreeable to [facility].  Referral placed.  [facility] accepted pt and can take 

her today (1/20)


Total Time Spent:  Greater than 30 minutes


This includes examination of the patient, discharge planning, medication 

reconciliation, and communication with other providers.





Discharge Instructions


Please refer to the electronic Patient Visit Report (Discharge Instructions) 

for additional information.

## 2017-01-20 NOTE — DISCHARGE INSTRUCTIONS
Discharge Instructions


Admission


Reason for Admission:  Copd Exacerbation, Pneumonia





Discharge


Discharge Diagnosis / Problem:  Chronic obstructive pulmonary disease 

exacerbation, influenza A





Discharge Goals


Goal(s):  Decrease discomfort, Improve function, Diagnostic testing, 

Therapeutic intervention





Activity Recommendations


Activity Limitations:  as noted below


Lifting Limitations:  no more than 10 pounds


Exercise/Sports Limitations:  gradually increase as tolerated


Shower/Bathe:  no limitations





.





Instructions / Follow-Up


Instructions / Follow-Up


You were admitted to the hospital for an exacerbation of your chronic 

obstructive pulmonary disease (COPD).  You were also found to be positive for 

influenza A.  You were treated with IV antibiotics, steroids, and Tamiflu, as 

well as supportive care such as IV fluids.  Due to your weakness and fatigue, 

you were evaluated by physical therapy, who recommended that you stay at an 

acute rehab facility to work on getting your strength back.  Unfortunately, 

your insurance denied a stay at [facility], but you will be able to pursue 

acute physical therapy rehab at [facility] instead.





You will continue your Duoneb treatments from home, however you may use them 

every 6 hours while you're awake.  You may use a treatment every 2 hours as 

needed for shortness of breath or wheezing.  Your QVAR has been held as it is 

likely not being drawn into your lungs properly and adding any real benefit.  

Your antibiotics have been stopped as you have received 7 days of treatment, 

which is adequate.





You will continue taking Prednisone.  As per Gray Robert PA-C's recommendations

, you have been placed on a long, slow taper.  Please take as directed:


   -Take 6 tablets by mouth a day for 4 days


   -Take 5.5 tablets a day for 4 days


   -Take 5 tablets a day for 4 days


   -Take 4.5 tablets a day for 4 days


   -Take 4 tablets a day for 4 days


   -Take 3.5 tablets a day for 4 days


   -Take 3 tablets a day for 4 days


   -Take 2.5 tablets a day for 4 days


   -Take 2 tablets a day for 4 days


   -Take 1.5 tablets a day for 4 days


   -Take 1 tablet a day for 4 days


   -Take 0.5 tablet a day for 4 days, then STOP





You also developed oral candidiasis, or thrush, during your stay.  You have 

been given oral Nystatin to treat this infection.  Please continue to use 5 mL (

1 teaspoon) of Nystatin four times a day for the next 11 days to complete the 

course.





Please seek medical attention if you experience fevers, chills, sweats, chest 

pain, worsening shortness of breath, nausea, or vomiting.





Please follow up with your primary care provider in 1 week.





Current Hospital Diet


Patient's current hospital diet: AHA Diet (Heart Healthy), Diabetes Type 2 Diet





Discharge Diet


Recommended Diet:  AHA Diet (Heart Healthy), Diabetes Type 2 Diet





Pending Studies


Studies pending at discharge:  no





Laboratory Results





 Hemoglobin A1c








Test


  1/16/17


06:07 Range/Units


 


 


Estimated Average Glucose 203   mg/dl


 


Hemoglobin A1c 8.7 H 4.5-5.6  %











Medical Emergencies








.


Who to Call and When:





Medical Emergencies:  If at any time you feel your situation is an emergency, 

please call 911 immediately.





.





Non-Emergent Contact


Non-Emergency issues call your:  Primary Care Provider


Call Non-Emergent contact if:  you have a fever, you have any medication 

questions





.





Past History


Medical & Surgical History:  


(1) Influenza A


(2) COPD (chronic obstructive pulmonary disease)


(3) Oral candidiasis


.





"Provider Documentation" section prepared by Shelia Doty.





VTE Core Measure


Inpt VTE Proph given/why not?:  SCD's

## 2017-01-20 NOTE — HOSPITALIST PROGRESS NOTE
Hospitalist Progress Note


Date of Service


Jan 20, 2017.





Subjective


Pt evaluation today including:  conversation w/ patient, physical exam, chart 

review, lab review, review of inpatient medication list


PO Intake:  Tolerating PO diet, appetite improving


Voiding:  no voiding problems


Patient reports feeling okay.  She states that she is slowly improving.  She 

still complains of shortness of breath, although she states that it has 

improved.  She also complains of dyspnea on exertion, a non-productive cough, 

and intermittent wheezing.  She states that she still has a decreased appetite 

but that it has also been slowly improving.  She complains of weakness and 

fatigue.  The patient denies fevers, chills, sweats, chest pain, palpitations, 

claudication, nausea, vomiting, abdominal pain, dysuria, hematuria, urinary 

retention, paralysis, weakness, numbness and tingling.





   Additional Comments:


See HPI for pertinent positives and negatives.  All other systems reviewed and 

negative.





Objective


Vital Signs











  Date Time  Temp Pulse Resp B/P Pulse Ox O2 Delivery O2 Flow Rate FiO2


 


1/20/17 08:00      Nasal Cannula 3.0 


 


1/20/17 07:59  73 18  96 Nasal Cannula 3.0 


 


1/20/17 07:27 36.5 56 18 162/82 97 Nasal Cannula 4.0 


 


1/20/17 00:10 36.4 71 18 178/105 94  4.0 


 


1/20/17 00:00      Nasal Cannula 4.0 


 


1/19/17 20:00  73 18  96 Nasal Cannula 3.0 


 


1/19/17 20:00      Nasal Cannula 3.0 


 


1/19/17 16:00      Nasal Cannula 3.0 


 


1/19/17 15:55  63 20  98 Nasal Cannula 3.0 


 


1/19/17 15:54 36.6 66 16 166/88 93 Nasal Cannula 3.0 





      Humidified Oxygen  











Physical Exam


General Appearance:  WD/WN, no apparent distress, + obese (morbidly obese)


Eyes:  normal inspection, PERRL, EOMI


ENT:  normal ENT inspection, hearing grossly normal, pharynx normal


Neck:  supple, no JVD, trachea midline


Respiratory/Chest:  normal breath sounds, no respiratory distress, + decreased 

breath sounds, + wheezing (inspiratory wheezing throughout)


Cardiovascular:  regular rate, rhythm, no gallop, no murmur


Abdomen:  normal bowel sounds, non tender, soft


Extremities:  normal inspection, no pedal edema, no calf tenderness


Neurologic/Psychiatric:  alert, normal mood/affect, oriented x 3


Skin:  normal color, warm/dry, no rash





Laboratory Results





Last 24 Hours








Test


  1/19/17


12:09 1/19/17


16:33 1/19/17


20:47 1/20/17


07:53


 


Bedside Glucose 139 mg/dl  149 mg/dl  138 mg/dl  123 mg/dl 














Test


  1/20/17


08:35 


  


  


 


 


White Blood Count 9.79 K/uL    


 


Red Blood Count 4.48 M/uL    


 


Hemoglobin 14.1 g/dL    


 


Hematocrit 43.3 %    


 


Mean Corpuscular Volume 96.7 fL    


 


Mean Corpuscular Hemoglobin 31.5 pg    


 


Mean Corpuscular Hemoglobin


Concent 32.6 g/dl 


  


  


  


 


 


Platelet Count 159 K/uL    


 


Mean Platelet Volume 10.4 fL    


 


Neutrophils (%) (Auto) 83.2 %    


 


Lymphocytes (%) (Auto) 8.2 %    


 


Monocytes (%) (Auto) 6.9 %    


 


Eosinophils (%) (Auto) 0.0 %    


 


Basophils (%) (Auto) 0.1 %    


 


Neutrophils # (Auto) 8.14 K/uL    


 


Lymphocytes # (Auto) 0.80 K/uL    


 


Monocytes # (Auto) 0.68 K/uL    


 


Eosinophils # (Auto) 0.00 K/uL    


 


Basophils # (Auto) 0.01 K/uL    


 


RDW Standard Deviation 44.6 fL    


 


RDW Coefficient of Variation 12.7 %    


 


Immature Granulocyte % (Auto) 1.6 %    


 


Immature Granulocyte # (Auto) 0.16 K/uL    


 


Sodium Level 141 mmol/L    


 


Potassium Level 3.9 mmol/L    


 


Chloride Level 94 mmol/L    


 


Carbon Dioxide Level 39 mmol/L    


 


Anion Gap 8.0 mmol/L    


 


Blood Urea Nitrogen 41 mg/dl    


 


Creatinine 1.40 mg/dl    


 


Est Creatinine Clear Calc


Drug Dose 40.5 ml/min 


  


  


  


 


 


Estimated GFR (


American) 42.8 


  


  


  


 


 


Estimated GFR (Non-


American 36.9 


  


  


  


 


 


BUN/Creatinine Ratio 28.9    


 


Random Glucose 99 mg/dl    


 


Calcium Level 9.0 mg/dl    











Assessment and Plan


71 y/o female with a history of COPD, CHF, DM II, HTN, and HLD who presents to 

the ED on 1/14 with worsening shortness of breath over the past week.  The 

symptoms initially began as a head congestion that then extended into her 

chest.  She went to see her PCP earlier in the week was placed on Levaquin and 

Prednisone on Jan. 10th. She has not had any relief of her symptoms.  She does 

use nebulizers at home without relief. Patient was admitted on 9/3/16 for acute 

COPD exacerbation. 





Acute exacerbation of chronic COPD


-Admit to telemetry.  Transferred to med/surg 1/19 as she has been stable


-Received Solu-Medrol 125 mg IV x 1 in ED on 1/14, then Solu-Medrol 40 mg IV 

q8h.  D/C Solu-Medrol on 1/17


-Decrease Prednisone to 60 mg PO qd


-D/C Vancomycin IV and Levofloxacin 500 mg IV 1/14


-D/C Zosyn IV 1/17


-Switched to Levaquin 750 mg PO q48h due to creatinine clearance <49.  Pt. had 

taken 4 days of Levaquin outpatient, but will try again now that flu is being 

managed


-Levaquin D/C'd 1/20 as pt has received 7 days of abx


-Guaifenesin extended release 600 mg PO BID


-Xopenex/Atrovent nebulizers q4hWA and q2h prn SOB/wheezing


-Hold QVAR as per pulm recs


-Blood cultures final no growth x 2


-Requiring 3L O2 with saturations in mid 90s (1/20)- wears 3L at home. 


-Pulmonology consulted, appreciate recs:  spoke with Gray Robert PA-C, who 

states that patient has very poor baseline and is not much worse off than her 

normal.  She will definitely need nebulizer treatments as she does not have the 

ability to adequately draw in MDI medications.  He gave her a 1 time dose of 

Solu-Medrol 20 mg IV to help open her up more, otherwise can continue 

prednisone 40 mg PO BID.  He recommended a very slow taper, in a few days she 

can go down to 60 mg qd and then taper 5 mg every 4-5 days.  Also recommended 

trying a flutter valve to help clear her mucus.


-Dr. Mcneal of pulmonology recs decreasing Prednisone, stopping abx, holding QVAR 

and adding Symbicort; however, pt has tried Symbicort in the past and it did 

not help.


-Sputum culture if produced





Influenza A


-Tamiflu 30 mg PO BID x5 days (started 1/15, AM), last dose will be evening of 1 /19--all doses taken, course completed 





Oral candidiasis


-Continue nystatin 5 ml PO QID x 14 days, day #3





Diabetes mellitus type 2--last recorded HgbA1c in 2014


-Continue Glipizide 5 mg PO BID


-Patient was given a one-time dose of Lantus 20 units subcutaneous on admission 

due to starting IV steroids


-Lantus 22 units subcutaneous twice a day while on IV steroids, titrate as 

needed 


   -- Increased to 26 units BID- better control of sugars 


-BSG AC & HS with sliding insulin scale 


-Rechecked HgbA1c 8.7 on 1/16.  Discussed with pt that she will need to follow 

up with her PCP as an outpatient regarding better control.  Sugars have 

improved while inpatient on Lantus and Novolog sliding scale, but she does not 

take any insulin at home.  As steroids are tapered down, may be able to 

decrease insulin.





HTN


-Continue metoprolol succinate 100 mg PO qd and lisinopril 10 mg PO qd





Chronic diastolic CHF


-Continue furosemide 40 mg PO BID and potassium extended release 20 mEq PO qd





HLD


-Continue pravastatin 10 mg PO qd





Restless leg syndrome


-Continue ropinirole 2 mg PO TID





Depression


-Continue fluoxetine 20 mg PO qd





Anemia


-Continue iron supplement 325 mg PO qd


-Continue B12 supplemental 1000 g PO qd





CKD, stage III, baseline cr. ~1.7


-Stable. Creatinine 1.4 1/20, BUN stable


-Follow with PRP 





DVT prophylaxis:


-GEETHA and SCDs





Dispo:


-Return to home when medically stable


-PT recommends acute inpatient rehab due to deconditioning and pt living alone 

with a full flight of stairs


-Insurance denied [facility].  CM talked to pt regarding SNF options, she is 

agreeable to [facility].  Referral placed.

## 2017-05-25 ENCOUNTER — HOSPITAL ENCOUNTER (INPATIENT)
Dept: HOSPITAL 45 - C.4E | Age: 72
LOS: 8 days | Discharge: HOME HEALTH | DRG: 191 | End: 2017-06-02
Attending: HOSPITALIST | Admitting: HOSPITALIST
Payer: COMMERCIAL

## 2017-05-25 VITALS
OXYGEN SATURATION: 91 % | SYSTOLIC BLOOD PRESSURE: 158 MMHG | TEMPERATURE: 98.24 F | DIASTOLIC BLOOD PRESSURE: 79 MMHG | HEART RATE: 72 BPM

## 2017-05-25 VITALS
BODY MASS INDEX: 37.75 KG/M2 | HEIGHT: 64 IN | WEIGHT: 221.12 LBS | WEIGHT: 221.12 LBS | HEIGHT: 64 IN | BODY MASS INDEX: 37.75 KG/M2 | BODY MASS INDEX: 37.75 KG/M2

## 2017-05-25 VITALS — HEART RATE: 96 BPM | OXYGEN SATURATION: 93 %

## 2017-05-25 VITALS
SYSTOLIC BLOOD PRESSURE: 134 MMHG | DIASTOLIC BLOOD PRESSURE: 77 MMHG | HEART RATE: 83 BPM | TEMPERATURE: 97.88 F | OXYGEN SATURATION: 91 %

## 2017-05-25 VITALS — OXYGEN SATURATION: 95 % | HEART RATE: 89 BPM

## 2017-05-25 VITALS — OXYGEN SATURATION: 3 %

## 2017-05-25 DIAGNOSIS — Z87.891: ICD-10-CM

## 2017-05-25 DIAGNOSIS — I13.0: ICD-10-CM

## 2017-05-25 DIAGNOSIS — E11.22: ICD-10-CM

## 2017-05-25 DIAGNOSIS — D50.8: ICD-10-CM

## 2017-05-25 DIAGNOSIS — N18.3: ICD-10-CM

## 2017-05-25 DIAGNOSIS — Z79.52: ICD-10-CM

## 2017-05-25 DIAGNOSIS — E78.5: ICD-10-CM

## 2017-05-25 DIAGNOSIS — G25.81: ICD-10-CM

## 2017-05-25 DIAGNOSIS — Z99.81: ICD-10-CM

## 2017-05-25 DIAGNOSIS — I50.32: ICD-10-CM

## 2017-05-25 DIAGNOSIS — N17.9: ICD-10-CM

## 2017-05-25 DIAGNOSIS — J44.1: Primary | ICD-10-CM

## 2017-05-25 DIAGNOSIS — Z79.891: ICD-10-CM

## 2017-05-25 DIAGNOSIS — Z79.84: ICD-10-CM

## 2017-05-25 DIAGNOSIS — B37.0: ICD-10-CM

## 2017-05-25 DIAGNOSIS — Z79.899: ICD-10-CM

## 2017-05-25 DIAGNOSIS — J96.11: ICD-10-CM

## 2017-05-25 DIAGNOSIS — F32.9: ICD-10-CM

## 2017-05-25 LAB
ANION GAP SERPL CALC-SCNC: 3 MMOL/L (ref 3–11)
APPEARANCE UR: CLEAR
BASOPHILS # BLD: 0.02 K/UL (ref 0–0.2)
BASOPHILS NFR BLD: 0.2 %
BILIRUB UR-MCNC: (no result) MG/DL
BUN SERPL-MCNC: 14 MG/DL (ref 7–18)
BUN/CREAT SERPL: 11.1 (ref 10–20)
CALCIUM SERPL-MCNC: 8.9 MG/DL (ref 8.5–10.1)
CHLORIDE SERPL-SCNC: 102 MMOL/L (ref 98–107)
CO2 SERPL-SCNC: 39 MMOL/L (ref 21–32)
COLOR UR: YELLOW
COMPLETE: YES
CREAT SERPL-MCNC: 1.3 MG/DL (ref 0.6–1.2)
EOSINOPHIL NFR BLD AUTO: 147 K/UL (ref 130–400)
GLUCOSE SERPL-MCNC: 162 MG/DL (ref 70–99)
HCT VFR BLD CALC: 35.5 % (ref 37–47)
IG%: 0.1 %
IMM GRANULOCYTES NFR BLD AUTO: 9.4 %
INR PPP: 1.1 (ref 0.9–1.1)
LYMPHOCYTES # BLD: 0.79 K/UL (ref 1.2–3.4)
MANUAL MICROSCOPIC REQUIRED?: NO
MCH RBC QN AUTO: 31.5 PG (ref 25–34)
MCHC RBC AUTO-ENTMCNC: 31 G/DL (ref 32–36)
MCV RBC AUTO: 101.7 FL (ref 80–100)
MONOCYTES NFR BLD: 5.4 %
NEUTROPHILS # BLD AUTO: 1.1 %
NEUTROPHILS NFR BLD AUTO: 83.8 %
NITRITE UR QL STRIP: (no result)
PH UR STRIP: 5 [PH] (ref 4.5–7.5)
PMV BLD AUTO: 10.7 FL (ref 7.4–10.4)
POTASSIUM SERPL-SCNC: 3.7 MMOL/L (ref 3.5–5.1)
PROTHROMBIN TIME: 11.6 SECONDS (ref 9–12)
RBC # BLD AUTO: 3.49 M/UL (ref 4.2–5.4)
REVIEW REQ?: NO
SODIUM SERPL-SCNC: 144 MMOL/L (ref 136–145)
SP GR UR STRIP: 1.01 (ref 1–1.03)
URINE BILL WITH OR WITHOUT MIC: (no result)
URINE EPITHELIAL CELL AUTO: (no result) /LPF (ref 0–5)
UROBILINOGEN UR-MCNC: (no result) MG/DL
WBC # BLD AUTO: 8.41 K/UL (ref 4.8–10.8)
ZZUR CULT IF INDIC CLEAN CATCH: YES

## 2017-05-25 RX ADMIN — LEVALBUTEROL SCH MG: 1.25 SOLUTION, CONCENTRATE RESPIRATORY (INHALATION) at 19:23

## 2017-05-25 RX ADMIN — INSULIN ASPART SCH UNITS: 100 INJECTION, SOLUTION INTRAVENOUS; SUBCUTANEOUS at 17:43

## 2017-05-25 RX ADMIN — ROPINIROLE HYDROCHLORIDE SCH MG: 1 TABLET, FILM COATED ORAL at 14:29

## 2017-05-25 RX ADMIN — INSULIN ASPART SCH UNITS: 100 INJECTION, SOLUTION INTRAVENOUS; SUBCUTANEOUS at 20:36

## 2017-05-25 RX ADMIN — ENOXAPARIN SODIUM SCH MG: 40 INJECTION SUBCUTANEOUS at 20:29

## 2017-05-25 RX ADMIN — INSULIN ASPART SCH UNITS: 100 INJECTION, SOLUTION INTRAVENOUS; SUBCUTANEOUS at 23:55

## 2017-05-25 RX ADMIN — ROPINIROLE HYDROCHLORIDE SCH MG: 1 TABLET, FILM COATED ORAL at 20:28

## 2017-05-25 RX ADMIN — IPRATROPIUM BROMIDE SCH MG: 0.5 SOLUTION RESPIRATORY (INHALATION) at 19:23

## 2017-05-25 NOTE — HISTORY AND PHYSICAL
History & Physical


Date & Time of Service:


May 25, 2017 at 13:29


Chief Complaint:


[place] Exacerbation


Primary Care Physician:


Marty Tineo D.O.


History of Present Illness


Source:  patient


This is a 72-year-old female with a past medical history of multiple COPD 

exacerbations, chronic diastolic CHF, DM II, HTN, and HLD who presents as a 

direct admission from the pulmonary clinic per request of Gray Robert PA-C.   

The paient has been admitted for multiple COPD exacerbations within the past 

year, this being the third in hospital stay.  Patient reports her increasing 

shortness of breath has come on for about a three-week timeframe.  She had 

cannot remember if she began feeling this way at the end of her last prednisone 

taper, but this seems to be about the same time frame.  She reports she is 

coughing and bringing up sputum which is yellow.  She also notes a decreased 

exercise tolerance to walking.  At home she wears 3 L O2 chronically, and 

currently with saturations equal to 91% on 4 L.   The patient has not been on 

antibiotics recently.  She denies any fevers, chills, sweats.  She admits to 

occasionally having a dull chest ache but denies any sharp acute or stabbing 

pains.  She denies any abdominal complaints, last bowel movement was yesterday.

  She typically ambulates without assistance, occasionally uses a cane when she 

is out.





Past Medical/Surgical History


Medical Problems:


(1) CHF (congestive heart failure)


Status: Chronic  





(2) CHF exacerbation


Status: Resolved  





(3) COPD (chronic obstructive pulmonary disease)


Status: Chronic  





(4) Diab Sanjuana Wo Compl, Type Ii Or Unspec Type, Uncontrolled


Status: Chronic  





(5) Hyperlipidemia Nec/Nos


Status: Chronic  





(6) Hypertension Nos


Status: Chronic  





(7) Respiratory failure, acute-on-chronic


Status: Chronic  











Family History





Cancer


Diabetes mellitus


FH: heart disease





Social History


Smoking Status:  Former Smoker


Smokeless Tobacco Use:  No


Alcohol Use:  none


Drug Use:  none


Marital Status:  single


Housing status:  lives alone


Occupational Status:  retired





Immunizations


History of Influenza Vaccine:  Yes


Influenza Vaccine Date:  Oct 17, 2013


History of Tetanus Vaccine?:  Yes


Tetanus Immunization Date:  Oct 8, 2013


History of Pneumococcal:  Yes


Pneumococcal Date:  Nov 17, 2010


History of Hepatitis B Vaccine:  No





Multi-Drug Resistant Organisms


History of MDRO:  No





Allergies


Coded Allergies:  


     Atropine (Verified  Allergy, Severe, RASH, 1/14/17)


     Dobutamine (Verified  Allergy, Severe, RASH, 1/14/17)





Home Medications


Scheduled


Cyanocobalamin (Vitamin B12 500MCG), 1,000 MCG PO DAILY


Ferrous Sulfate (Iron Supplement), 325 MG PO DAILY


Fluoxetine Hcl (Prozac), 20 MG PO QAM


Furosemide (Lasix), 20 MG PO DAILY


Glipizide (Glipizide), 5 MG PO BID


Ipratropium-Albuterol (Duoneb), 1 TREATMENT INH Q6HWA


Lisinopril (Prinivil), 10 MG PO DAILY


Metoprolol Succ (Toprol Xl) (Toprol-Xl ), 100 MG PO DAILY


Nystatin (Nystatin), 5 ML PO QID


Pravastatin Sodium (Pravastatin Sodium), 10 MG PO DAILY


Prednisone (Prednisone), 10 MG PO UD


Ropinirole (Requip), 2 MG PO TID


Spironolactone (Aldactone), 12.5 MG PO QAM





Scheduled PRN


Amoxicillin (Amoxil), 500 MG PO UD PRN for DENTAL WORK


Tramadol HCl (Tramadol HCl), 50 MG PO Q6 PRN for Pain





Review of Systems


Constitutional: No fever, chills, sweats, + increased fatigue & weakness


Eyes:  No diplopia, no changes in vision


ENT:  No sore throat,  tinnitus,  or trouble swallowing


Respiratory: See history of present illness


Cardiovascular:  No chest pain,  palpitations, or flutter


Abdomen:  No pain, No constipation, No diarrhea, No nausea, No vomiting


Musculoskeletal:  No calf pain, No joint pain, No swelling


Genitourinary : No dysuria or urinary frequency, No hematuria


Neurologic:  No numbness/tingling, no difficulty with ambulation, no sensory or 

motor deficits


Psychiatric:  No depression or anxiety symptoms


Endocrine:  No fatigue, No weight changes


Integumentary:  No itch, No rash





Physical Exam


Vital Signs











  Date Time  Temp Pulse Resp B/P Pulse Ox O2 Delivery O2 Flow Rate FiO2


 


5/25/17 13:23 36.6 83 20 134/77 91  4.0 








General: awake, alert, no apparent distress, sitting up in bed


Head: Normocephalic, atraumatic


ENT: PERRL, EOMI, no pharyngeal exudate, mucous membranes moist


Chest: On 4 L via NC, sats equal 91%, presents with expiratory wheezing 

throughout and tight breath sounds, no rales or rhonchi.


Cardiac: Regular rate and rhythm, no murmur, no JVD, normal peripheral pulses, 

good capillary refill


Abdominal: NABS x 4 quadrants, soft, nontender to palpation, no rebound, 

guarding or tenderness


Extremities: Normal inspection, no peripheral edema or erythema, calfs 

nontender to palpation


Psych: Normal mood and affect


Neuro: AAO x 3, strength intact bilaterally and related 5/5, no motor deficits, 

speech is clear, no peripheral sensory deficits





Diagnostics


Diagnostic Radiology


Ordered CXR





Impression


Assessment and Plan


71 y/o female with PMHx of COPD, chronic diastolic CHF, DM II, HTN, and HLD who 

presents as a direct admission from the pulmonary clinic per request of Gray Robert PA-C.   The fidel has been admitted for multiple COPD exacerbations 

within the past year, this being the third in hospital stay.  





Acute exacerbation of chronic COPD


[facility] 


- Ordered Solu-Medrol 125 mg IV x 1 in ED on 1/14, then Solu-Medrol 40 mg IV 

q8h - can eventually transition to po taper once symptoms improved. 


- Continue supportive care with duonebs, incentive spirometry, guaifenesin 600 

mg BID for now


- Xopenex/Atrovent nebulizers q4hWA and q2h prn SOB/wheezing


- No labs, so ordering all. Will check blood cultures - WBC = 8K with a mild 

left shift, hgb 11, MCV is elevated,  


- O2 sats are 91 % on 4 L, typically wears 3L at baseline


- Pulmonology consulted, appreciate recs


- Sputum culture if produced





Diabetes mellitus type 2


- Continue Glipizide 5 mg PO BID


- Will order one-time dose of Lantus 20 units subcutaneous on admission due to 

starting IV steroids, can titrate as glucose increases- last admission the pt 

required 26 U BID which allowed for adequate control of glucose


- Lantus 22 units subcutaneous twice a day while on IV steroids, titrate as 

needed 


- ISS with accuchecks AC & HS 


- Last  HgbA1c 8.7 on 1/16. - rechecking





HTN


-Continue metoprolol succinate 100 mg PO qd and lisinopril 10 mg PO qd





Chronic diastolic CHF


-Continue furosemide 40 mg PO BID and potassium extended release 20 mEq PO qd





HLD


-Continue pravastatin 10 mg PO qd





Restless leg syndrome


-Continue ropinirole 2 mg PO TID





Depression


-Continue fluoxetine 20 mg PO qd





Anemia, iron deficiency


-Continue iron supplement 325 mg PO qd


-Continue B12 supplemental 1000 g PO qd





CKD, stage III, baseline cr. ~1.6


-Stable. Creatinine 1.3


-Follow with PRP 





DVT prophylaxis: -GEETHA and SCDs, Lovenox 40 mg subq





CODE STATUS: FULL CODE





Disposition: Pt from home, discharge when medically stable





Level of Care


Med/Surg





Resuscitation Status


FULL RESUSCITATION





VTE Prophylaxis


Risk Level:  Low


Given or contraindicated:  Enoxaparin (Lovenox)SQ, T.E.D. Stockings, SCD's





Reviewed:  Pt Seen/Exam by Me


History


Pt is feeling somewhat improved.  Feels her breathing is still tight.  No chest 

pain.  Ate without issue.  





Does note LE swelling b/l.  She states she has had more salt lately.  She also 

states that she has self decreased her lasix dose due to urinary incontinence.  

On prior lasix dosing she noted "just some dribbling" and was able to do fine 

with depends, however on the increased dosing she is having what she describes 

as stress incontinence that is soaking 3-4 depends a day "and I can't afford 

that".  She states she feels the urge to urinate but by the time she is able to 

get to a bathroom she has already gone. "If I took the dose they had me on I 

would have to sit by the toilet all day."  She has not seen cardiology to 

discuss this with them.





Agree with HPI/ROS as noted.





Pt was reported to me by Gray Robert as hypoxic to 70s-80s in the office on 

increased O2.


General Appearance:  no apparent distress, obese


Respiratory:  lungs clear, no respiratory distress, decreased breath sounds


Cardiovascular:  normal peripheral pulses, regular rate, rhythm


Gastrointestinal:  non tender, soft


Extremities:  non-tender, pedal edema (1+ pitting)


Neurologic/Psychiatric:  alert, normal mood/affect


Skin Characteristics:  normal color, warm/dry


Assessment/Plan


Agree with plan as outlined above





COPD exacerbation


nebs, steroids





CHF: self decreased lasix dosing


   Monitor during admission


   This will need addressed prior to or shortly after d/c to avoid CHF 

exacerbation

## 2017-05-25 NOTE — PULMONARY CONSULTATION
History


General


Date of Service:


May 25, 2017.


Stated Complaint:


COPD Exacerbation





HPI


The patient is a 72 year old female with h/o end stage COPD, chronically 

dyspneic, was progressively getting worse, states that her O2 sat was around 70

% with minimal exertion at home, taking a long time to recover. She denies 

fever or chills, denies worsening cough or sputum production. Had multiple 

hospitalizations in the past for COPD exacerbations. Last January she was 

admitted foe influenza.


She is not on steroid therapy now, but for the past year she was taking 

steroids for a long time, she would tend to worsen when it would be stopped





In  her PFT:


FEV1/FVC: 47%


FEV1: 0.83/38%


FVC: 1.77/65%


T.63/252%


FVC: 2.85/95%


RV: 9.04/480%


RV/T%


DLCO: 40%


DLCO/VA: 75%


Historian:  patient





Review of Systems


Constitutional:  reports: as stated in HPI


Eyes:  reports: no symptoms


ENT:  reports: no symptoms


Cardiovascular:  reports: edema, denies: chest pain, syncope


Respiratory:  reports: OMALLEY, cough, shortness of breath, sputum production, 

wheezing


Gastrointestinal:  reports: no symptoms


Genitourinary - Female:  reports: no symptoms


Musculoskeletal:  reports: no symptoms


Neurologic:  reports: no symptoms


Psychiatric:  reports: no symptoms


Endocrine:  no symptoms


Hematologic / Lymphatic:  no symptoms





Past Medical History


Past Medical History:  COPD, depression, GERD, hypertension


Past Surgical History:  hysterectomy, orthopedic surgery





Family History





Cancer


Diabetes mellitus


FH: heart disease


Parent: Cancer





Social History


Hx Tobacco Use In Past Year?:  Yes


Smoking Status:  Former Smoker (quit in , almost 1 ppd for most of adult 

life)


Alcohol:  socially


Drug Use:  none


Marital status:  single


Housing status:  lives alone


Occupational Status:  retired





Immunizations


History of Influenza Vaccine:  Yes


Influenza Vaccine Date:  Oct 17, 2013


History of Tetanus Vaccine?:  Yes


Tetanus Immunization Date:  Oct 8, 2013


History of Pneumococcal:  Yes


Pneumococcal Date:  2010


History of Hepatitis B Vaccine:  No





History of MDRO


History of MDRO:  No





Allergies


Coded Allergies:  


     Atropine (Verified  Allergy, Severe, RASH, 17)


     Dobutamine (Verified  Allergy, Severe, RASH, 17)





Current Medications





 Reported Home Medications








 Medications  Dose


 Route/Sig


 Max Daily Dose Days Date Category Dose


Instructions


 


 Prednisone 10 Mg


 Tab  10 Mg


 PO UD


   48 17 Rx  Take 6 tabs (60 mg) daily for 4 days. Then decrease dose by 5 

mg


 daily for 4 days. Decrease dose by 5 mg every 4 days.


 


 Nystatin 5 Ml Susp  5 Ml


 PO QID


   11 17 Rx  Take 5 ml (1 teaspoon) by mouth four times a day.


 


 Duoneb


  (Ipratropium-Albuterol)


 3 Ml Nebu  1 Treatment


 INH Q6HWA


   30 17 Rx  Use 1 neb treatment every 6 hours while awake.  May use


 every 2 hours as needed for shortness of breath & wheezing.


 


 Aldactone


  (Spironolactone)


 25 Mg Tab  12.5 Mg


 PO QAM


    17 Reported 


 


 Amoxil


  (Amoxicillin) 500


 Mg Cap  500 Mg


 PO UD PRN


    17 Reported 


 


 Lasix


  (Furosemide) 20


 Mg Tab  20 Mg


 PO DAILY


    17 Reported 


 


 Tramadol HCl 50


 Mg Tab  50 Mg


 PO Q6 PRN


    17 Reported 


 


 Glipizide 5 Mg Tab  5 Mg


 PO BID


    17 Reported 


 


 Pravastatin


 Sodium 10 Mg Tab  10 Mg


 PO DAILY


    14 Reported 


 


 Vitamin B12


 500MCG


  (Cyanocobalamin)


 500 Mcg Tab  1,000 Mcg


 PO DAILY


    14 Reported 


 


 Prinivil


  (Lisinopril) 10


 Mg Tab  10 Mg


 PO DAILY


    14 Reported 


 


 Iron Supplement


  (Ferrous Sulfate)


 325 Mg Tab  325 Mg


 PO DAILY


    4/10/13 Reported 


 


 Requip


  (Ropinirole HCl)


 2 Mg Tab  2 Mg


 PO TID


    4/10/13 Reported 


 


 Toprol-Xl


  (Metoprolol


 Succinate) 100 Mg


 Tabcr  100 Mg


 PO DAILY


    4/10/13 Reported 


 


 Prozac


  (Fluoxetine Hcl)


 20 Mg Cap  20 Mg


 PO QAM


    12 Reported 











Physical


Physical Exam


Vital Signs:











  Date Time  Temp Pulse Resp B/P Pulse Ox O2 Delivery O2 Flow Rate FiO2


 


17 15:19  89 22  95 Nasal Cannula 3.0 


 


17 13:23 36.6 83 20 134/77 91 Nasal Cannula 4.0 








General Appearance:  mild distress, obese


Eyes:  PERRLA


ENT:  NORMAL THROAT EXAM


Neck:  NO TENDERNESS, TRACHEA MIDLINE


Respiratory:  wheezing, other (very diminished breath sounds)


Cardiovasular:  REGULAR RATE/RHYTHM, NORMAL S1S2


Abdomen:  NON TENDER, NO REBOUND, NO GUARDING


Upper Extremities:  NO EDEMA


Lower Extremities:  edema (bilateral)


Neuro:  ALERT, ORIENTED x 3





Diagnostics


Labs





Results Past 24 Hours








Test


  17


14:20 17


14:35 17


16:45 17


16:48 Range/Units


 


 


White Blood Count 8.41    4.8-10.8  K/uL


 


Red Blood Count 3.49    4.2-5.4  M/uL


 


Hemoglobin 11.0    12.0-16.0  g/dL


 


Hematocrit 35.5    37-47  %


 


Mean Corpuscular Volume 101.7      fL


 


Mean Corpuscular Hemoglobin 31.5    25-34  pg


 


Mean Corpuscular Hemoglobin


Concent 31.0


  


  


  


  32-36  g/dl


 


 


Platelet Count 147    130-400  K/uL


 


Mean Platelet Volume 10.7    7.4-10.4  fL


 


Neutrophils (%) (Auto) 83.8     %


 


Lymphocytes (%) (Auto) 9.4     %


 


Monocytes (%) (Auto) 5.4     %


 


Eosinophils (%) (Auto) 1.1     %


 


Basophils (%) (Auto) 0.2     %


 


Neutrophils # (Auto) 7.05    1.4-6.5  K/uL


 


Lymphocytes # (Auto) 0.79    1.2-3.4  K/uL


 


Monocytes # (Auto) 0.45    0.11-0.59  K/uL


 


Eosinophils # (Auto) 0.09    0-0.5  K/uL


 


Basophils # (Auto) 0.02    0-0.2  K/uL


 


RDW Standard Deviation 50.3    36.4-46.3  fL


 


RDW Coefficient of Variation 13.5    11.5-14.5  %


 


Immature Granulocyte % (Auto) 0.1     %


 


Immature Granulocyte # (Auto) 0.01    0.00-0.02  K/uL


 


Sodium Level 144    136-145  mmol/L


 


Potassium Level 3.7    3.5-5.1  mmol/L


 


Chloride Level 102      mmol/L


 


Carbon Dioxide Level 39    21-32  mmol/L


 


Anion Gap 3.0    3-11  mmol/L


 


Blood Urea Nitrogen 14    7-18  mg/dl


 


Creatinine


  1.30


  


  


  


  0.60-1.20


mg/dl


 


Estimated GFR (


American) 46.8


  


  


  


   


 


 


Estimated GFR (Non-


American 40.4


  


  


  


   


 


 


BUN/Creatinine Ratio 11.1    10-20  


 


Random Glucose 162    70-99  mg/dl


 


Calcium Level 8.9    8.5-10.1  mg/dl


 


Prothrombin Time


  


  11.6


  


  


  9.0-12.0


SECONDS


 


Prothromb Time International


Ratio 


  1.1


  


  


  0.9-1.1  


 


 


Urine Color   YELLOW   


 


Urine Appearance   CLEAR  CLEAR  


 


Urine pH   5.0  4.5-7.5  


 


Urine Specific Gravity   1.010  1.000-1.030  


 


Urine Protein   NEG  NEG  


 


Urine Glucose (UA)   NEG  NEG  


 


Urine Ketones   NEG  NEG  


 


Urine Occult Blood   NEG  NEG  


 


Urine Nitrite   NEG  NEG  


 


Urine Bilirubin   NEG  NEG  


 


Urine Urobilinogen   NEG  NEG  


 


Urine Leukocyte Esterase   TRACE  NEG  


 


Urine WBC (Auto)   10-30  0-5  /hpf


 


Urine RBC (Auto)   0-4  0-4  /hpf


 


Urine Hyaline Casts (Auto)   1-5  0-5  /lpf


 


Urine Epithelial Cells (Auto)   20-30  0-5  /lpf


 


Urine Bacteria (Auto)   NEG  NEG  


 


Bedside Glucose    203 70-90  mg/dl








 Microbiology Results


17 Blood Culture, Received


          Pending


17 Blood Culture, Received


          Pending


17 Urine Culture, Received


          Pending





Diagnostic Radiology


CXR today:


1. Cardiomegaly and emphysema. There is no radiographic evidence of congestive


failure.





2. There are increasing bibasilar airspace opacities as compared to 2017.


This could represent atelectasis versus an infectious/inflammatory pneumonitis.


Clinical correlation will be required and radiographic follow-up to resolution


is recommended.





Impression


Assessment and Plan


72 year old female with advanced emphysema, presents with an exacerbation 

episode.





Plan: [place] steroids, bronchodilators around the clock


No immediate need for Abx, WBC normal, afebrile. The CXR likely shows basilar 

atelectasis.


Supplement O2.


Will consider long term Prednisone therapy after the taper. To be followed up 

as outpatient. 


Continue diuretics. Has pulmonary HTN based on RHC from , PAP 52/21. 

Probably secondary to pulmonary disease, not amenable to PH therapy since it is 

not group I.


DVT prophylaxis





Note


Total Time (mins):  35

## 2017-05-25 NOTE — DIAGNOSTIC IMAGING REPORT
TWO VIEW CHEST



CLINICAL HISTORY: COPD exacerbation..



FINDINGS: PA and lateral chest radiographs are compared to study dated 1/17/2017

and correlated with chest CT dated 9/5/2016. PA view is degraded by patient

rotation. The heart is enlarged and there is atherosclerotic calcification of

the thoracic aorta. The pulmonary vasculature is noncongested. Emphysematous

change and chronic interstitial thickening are similar to previous. There are

bibasilar airspace opacities. No pleural effusion is identified. There is no

pneumothorax. The skeletal structures are osteopenic. Degenerative change is

noted in the thoracic spine and shoulders. Fusion hardware is partially imaged

in the upper lumbar spine. Cholecystectomy clips are observed.



IMPRESSION:



1. Cardiomegaly and emphysema. There is no radiographic evidence of congestive

failure.



2. There are increasing bibasilar airspace opacities as compared to 1/17/2017.

This could represent atelectasis versus an infectious/inflammatory pneumonitis.

Clinical correlation will be required and radiographic follow-up to resolution

is recommended.







Electronically signed by:  Sukhjinder Hendricks M.D.

5/25/2017 3:59 PM



Dictated Date/Time:  5/25/2017 3:57 PM

## 2017-05-25 NOTE — PHARMACY PROGRESS NOTE
Glycemic Control Intl Consult


Date of Service


May 25, 2017.





Scope


Glycemic Pharmacist consulted by Dr Skinner on 5/25/17 for glycemic control 

and to write orders per [facility] inpatient glycemic control protocol





Objective


Weight (Kilograms):  102.400


Accuchecks BSG (last 24hrs):











Test


  5/25/17


14:20


 


Random Glucose


  162 mg/dl


(70-99)








Laboratory Data (last 24hrs)











Test


  5/25/17


14:20


 


Anion Gap 3.0 mmol/L 


 


BUN/Creatinine Ratio 11.1 


 


Blood Urea Nitrogen 14 mg/dl 


 


Creatinine 1.30 mg/dl 


 


Potassium Level 3.7 mmol/L 


 


Sodium Level 144 mmol/L 


 


White Blood Count 8.41 K/uL 


 


Red Blood Count 3.49 M/uL 


 


Hemoglobin 11.0 g/dL 


 


Hematocrit 35.5 % 


 


Mean Corpuscular Volume 101.7 fL 


 


Mean Corpuscular Hemoglobin 31.5 pg 


 


Mean Corpuscular Hemoglobin


Concent 31.0 g/dl 


 


 


Platelet Count 147 K/uL 


 


Mean Platelet Volume 10.7 fL 


 


Neutrophils (%) (Auto) 83.8 % 


 


Lymphocytes (%) (Auto) 9.4 % 


 


Monocytes (%) (Auto) 5.4 % 


 


Eosinophils (%) (Auto) 1.1 % 


 


Basophils (%) (Auto) 0.2 % 


 


Neutrophils # (Auto) 7.05 K/uL 


 


Lymphocytes # (Auto) 0.79 K/uL 


 


Monocytes # (Auto) 0.45 K/uL 


 


Eosinophils # (Auto) 0.09 K/uL 


 


Basophils # (Auto) 0.02 K/uL 











Recent Pertinent Medications


Outpatient Anti-diabetic Regimen: 


* Glipizide 5 mg PO BID


* A1c = 8.7 %  1/16/17





Risk Factors for Insulin Resistance:


* Steroids: Solu-medrol 125 mg IV x 1, then 40 mg IV q8h


* Diet: AHA/T2DM





Assessment & Plan


ASSESSMENT:


* ADA & AACE recommend a goal blood sugar range 140-180 mg/dl for the majority 

of critically ill & non-critically ill patients.  However, more stringent 

targets may be selected in individual cases.  Will utilize more stringent goal 

of 110-140 mg/dl based on patient age & comorbidities.


* Pt is maintained on oral antidiabetic agents as an outpatient


 * Oral agents are not recommended for inpatient use d/t drug interactions, 

changing PO intake, and difficulty titrating for acute hyper/hypoglycemia. ADA 

recommends re-initiating outpatient oral agents 1-2 days prior to discharge if/

when appropriate if they were held on admission. 


* Will hold oral agents for admission and utilize SQ basal bolus insulin 

regimen which is the recommended regimen for inpatient glycemic control.


* 72 yr old T2DM female admitted with COPD exacerbation receiving high dose IV 

steroids. 


* Patient was admitted to [facility] in January of this year. She was on the same 

dose of steroids (40 mg IV q8) and required 26 units of Lantus BID plus 11-32 

units of bolus insulin per day.


* Current BSG is acceptable, however, I anticipate steroid induced 

hyperglycemia.


 * Will give first dose of Lantus now - based on weight and stress of 3. 

Further Lantus per scale until exact requirements are known.


 * Bolus insulin will be based on weight and stress of 2





PLAN FOR INPATIENT GLYCEMIC CONTROL:





* Holding outpatient oral diabetes medications





* Basal insulin with LANTUS 


 * 25 units now


 * 20-25 units (20 if BSG is less than 160, 25 units for  mg/dL or more)





* Correctional Insulin with NOVOLOG ACHS 


 * Goal Range:  Low 110 mg/dL - High 140 mg/dL


 * Correction Factor: 25 mg/dL/unit


 * Nutritional / Prandial insulin per carb ratio of 1 unit per 8 grams CHO 

consumed


 * Add overnight checks with coverage at 00 and 04





* Please note that the plan above was derived based on current level of insulin 

resistance and hospital stress. These recommendations are appropriate for 

inpatient admission only. Plan of care upon discharge will need to be 

reassessed to avoid potential outpatient hypo/hyperglycemia. 





Thank you.

## 2017-05-26 VITALS
SYSTOLIC BLOOD PRESSURE: 162 MMHG | TEMPERATURE: 98.42 F | DIASTOLIC BLOOD PRESSURE: 87 MMHG | OXYGEN SATURATION: 93 % | HEART RATE: 69 BPM

## 2017-05-26 VITALS
SYSTOLIC BLOOD PRESSURE: 148 MMHG | TEMPERATURE: 97.88 F | DIASTOLIC BLOOD PRESSURE: 79 MMHG | OXYGEN SATURATION: 97 % | HEART RATE: 56 BPM

## 2017-05-26 VITALS — OXYGEN SATURATION: 95 % | HEART RATE: 66 BPM

## 2017-05-26 VITALS
TEMPERATURE: 97.88 F | HEART RATE: 57 BPM | SYSTOLIC BLOOD PRESSURE: 170 MMHG | OXYGEN SATURATION: 93 % | DIASTOLIC BLOOD PRESSURE: 90 MMHG

## 2017-05-26 VITALS — HEART RATE: 96 BPM | OXYGEN SATURATION: 93 %

## 2017-05-26 VITALS — OXYGEN SATURATION: 90 % | HEART RATE: 68 BPM

## 2017-05-26 VITALS — OXYGEN SATURATION: 92 % | HEART RATE: 68 BPM

## 2017-05-26 VITALS — HEART RATE: 66 BPM | OXYGEN SATURATION: 94 %

## 2017-05-26 VITALS — OXYGEN SATURATION: 94 %

## 2017-05-26 LAB
ANION GAP SERPL CALC-SCNC: 5 MMOL/L (ref 3–11)
BASOPHILS # BLD: 0 K/UL (ref 0–0.2)
BASOPHILS NFR BLD: 0 %
BUN SERPL-MCNC: 23 MG/DL (ref 7–18)
BUN/CREAT SERPL: 16.5 (ref 10–20)
CALCIUM SERPL-MCNC: 8.1 MG/DL (ref 8.5–10.1)
CHLORIDE SERPL-SCNC: 100 MMOL/L (ref 98–107)
CO2 SERPL-SCNC: 37 MMOL/L (ref 21–32)
COMPLETE: YES
CREAT CL PREDICTED SERPL C-G-VRATE: 40.7 ML/MIN
CREAT SERPL-MCNC: 1.4 MG/DL (ref 0.6–1.2)
EOSINOPHIL NFR BLD AUTO: 119 K/UL (ref 130–400)
EST. AVERAGE GLUCOSE BLD GHB EST-MCNC: 143 MG/DL
GLUCOSE SERPL-MCNC: 215 MG/DL (ref 70–99)
HCT VFR BLD CALC: 31.1 % (ref 37–47)
IG%: 0.4 %
IMM GRANULOCYTES NFR BLD AUTO: 5.8 %
LYMPHOCYTES # BLD: 0.31 K/UL (ref 1.2–3.4)
MCH RBC QN AUTO: 32.3 PG (ref 25–34)
MCHC RBC AUTO-ENTMCNC: 32.2 G/DL (ref 32–36)
MCV RBC AUTO: 100.3 FL (ref 80–100)
MONOCYTES NFR BLD: 1.9 %
NEUTROPHILS # BLD AUTO: 0.2 %
NEUTROPHILS NFR BLD AUTO: 91.7 %
PMV BLD AUTO: 10.9 FL (ref 7.4–10.4)
POTASSIUM SERPL-SCNC: 4.2 MMOL/L (ref 3.5–5.1)
RBC # BLD AUTO: 3.1 M/UL (ref 4.2–5.4)
SODIUM SERPL-SCNC: 142 MMOL/L (ref 136–145)
WBC # BLD AUTO: 5.35 K/UL (ref 4.8–10.8)

## 2017-05-26 RX ADMIN — METHYLPREDNISOLONE SODIUM SUCCINATE SCH MLS/MIN: 1 INJECTION, POWDER, FOR SOLUTION INTRAMUSCULAR; INTRAVENOUS at 13:02

## 2017-05-26 RX ADMIN — INSULIN ASPART SCH UNITS: 100 INJECTION, SOLUTION INTRAVENOUS; SUBCUTANEOUS at 09:00

## 2017-05-26 RX ADMIN — INSULIN GLARGINE SCH UNIT: 100 INJECTION, SOLUTION SUBCUTANEOUS at 09:01

## 2017-05-26 RX ADMIN — IPRATROPIUM BROMIDE SCH MG: 0.5 SOLUTION RESPIRATORY (INHALATION) at 14:09

## 2017-05-26 RX ADMIN — FERROUS SULFATE TAB EC 325 MG (65 MG FE EQUIVALENT) SCH MG: 325 (65 FE) TABLET DELAYED RESPONSE at 07:57

## 2017-05-26 RX ADMIN — METHYLPREDNISOLONE SODIUM SUCCINATE SCH MLS/MIN: 1 INJECTION, POWDER, FOR SOLUTION INTRAMUSCULAR; INTRAVENOUS at 21:05

## 2017-05-26 RX ADMIN — FLUOXETINE SCH MG: 20 CAPSULE ORAL at 07:56

## 2017-05-26 RX ADMIN — LEVALBUTEROL SCH MG: 1.25 SOLUTION, CONCENTRATE RESPIRATORY (INHALATION) at 07:28

## 2017-05-26 RX ADMIN — PRAVASTATIN SODIUM SCH MG: 10 TABLET ORAL at 07:55

## 2017-05-26 RX ADMIN — INSULIN ASPART SCH UNITS: 100 INJECTION, SOLUTION INTRAVENOUS; SUBCUTANEOUS at 17:45

## 2017-05-26 RX ADMIN — ROPINIROLE HYDROCHLORIDE SCH MG: 1 TABLET, FILM COATED ORAL at 21:05

## 2017-05-26 RX ADMIN — ROPINIROLE HYDROCHLORIDE SCH MG: 1 TABLET, FILM COATED ORAL at 13:02

## 2017-05-26 RX ADMIN — LISINOPRIL SCH MG: 10 TABLET ORAL at 07:53

## 2017-05-26 RX ADMIN — INSULIN ASPART SCH UNITS: 100 INJECTION, SOLUTION INTRAVENOUS; SUBCUTANEOUS at 13:00

## 2017-05-26 RX ADMIN — INSULIN GLARGINE SCH UNIT: 100 INJECTION, SOLUTION SUBCUTANEOUS at 21:07

## 2017-05-26 RX ADMIN — Medication SCH MCG: at 07:56

## 2017-05-26 RX ADMIN — SPIRONOLACTONE SCH MG: 25 TABLET, FILM COATED ORAL at 07:57

## 2017-05-26 RX ADMIN — LEVALBUTEROL SCH MG: 1.25 SOLUTION, CONCENTRATE RESPIRATORY (INHALATION) at 01:40

## 2017-05-26 RX ADMIN — LEVALBUTEROL SCH MG: 1.25 SOLUTION, CONCENTRATE RESPIRATORY (INHALATION) at 18:51

## 2017-05-26 RX ADMIN — IPRATROPIUM BROMIDE SCH MG: 0.5 SOLUTION RESPIRATORY (INHALATION) at 18:51

## 2017-05-26 RX ADMIN — LEVALBUTEROL SCH MG: 1.25 SOLUTION, CONCENTRATE RESPIRATORY (INHALATION) at 14:09

## 2017-05-26 RX ADMIN — ENOXAPARIN SODIUM SCH MG: 40 INJECTION SUBCUTANEOUS at 21:05

## 2017-05-26 RX ADMIN — FUROSEMIDE SCH MG: 20 TABLET ORAL at 07:55

## 2017-05-26 RX ADMIN — METOPROLOL SUCCINATE SCH MG: 50 TABLET, EXTENDED RELEASE ORAL at 07:53

## 2017-05-26 RX ADMIN — IPRATROPIUM BROMIDE SCH MG: 0.5 SOLUTION RESPIRATORY (INHALATION) at 01:40

## 2017-05-26 RX ADMIN — ROPINIROLE HYDROCHLORIDE SCH MG: 1 TABLET, FILM COATED ORAL at 07:54

## 2017-05-26 RX ADMIN — IPRATROPIUM BROMIDE SCH MG: 0.5 SOLUTION RESPIRATORY (INHALATION) at 07:28

## 2017-05-26 RX ADMIN — INSULIN ASPART SCH UNITS: 100 INJECTION, SOLUTION INTRAVENOUS; SUBCUTANEOUS at 03:54

## 2017-05-26 RX ADMIN — INSULIN ASPART SCH UNITS: 100 INJECTION, SOLUTION INTRAVENOUS; SUBCUTANEOUS at 21:00

## 2017-05-26 NOTE — HOSPITALIST PROGRESS NOTE
Hospitalist Progress Note


Date of Service


May 26, 2017.





Subjective


Pt evaluation today including:  conversation w/ patient, physical exam, chart 

review, lab review, review of studies, conversation w/ consultant, review of 

inpatient medication list


Patient reports still feeling fairly winded.  Occasional productive cough with 

yellow sputum.  Denies any fever or chills.  She was particularly winded when 

she got up to use the restroom.  Denies any dizziness.  She reports her oxygen 

dropped to 70s with ambulation.





   Additional Comments:


6 system review negative. Please see pertinent positives in the history of 

present illness section.





Objective


Vital Signs











  Date Time  Temp Pulse Resp B/P Pulse Ox O2 Delivery O2 Flow Rate FiO2


 


5/26/17 14:09  68 16  90 Nasal Cannula 3.0 


 


5/26/17 08:00     94 Nasal Cannula 3.0 


 


5/26/17 07:28  66 16  94 Nasal Cannula 3.0 


 


5/26/17 07:18 36.6 56 20 148/79 97 Room Air  


 


5/26/17 01:40  96 16  93 Nasal Cannula 3.0 


 


5/26/17 00:01      Nasal Cannula 4.0 


 


5/25/17 23:43 36.8 72 18 158/79 91 Nasal Cannula 2.0 


 


5/25/17 19:30      Nasal Cannula 4.0 


 


5/25/17 19:24  96 26  93 Nasal Cannula 3.0 


 


5/25/17 15:19  89 22  95 Nasal Cannula 3.0 











Physical Exam


General Appearance:  + mild distress (mild respiratory distress)


Eyes:  EOMI


Neck:  no JVD


Respiratory/Chest:  + pertinent finding (decreased breath sounds at the bases 

bilaterally.  No wheezing auscultated.  No crackles.  No rhonchi.)


Cardiovascular:  regular rate, rhythm


Abdomen:  normal bowel sounds, non tender, soft


Extremities:  + pertinent finding (+1 pitting edema noted in the lower 

extremities left greater than right.  No erythema, warmth or tenderness 

appreciated.)


Neurologic/Psychiatric:  no motor/sensory deficits, oriented x 3


Skin:  warm/dry





Laboratory Results


5/26/17 06:20








Red Blood Count 3.10, Mean Corpuscular Volume 100.3, Mean Corpuscular 

Hemoglobin 32.3, Mean Corpuscular Hemoglobin Concent 32.2, Mean Platelet Volume 

10.9, Neutrophils (%) (Auto) 91.7, Lymphocytes (%) (Auto) 5.8, Monocytes (%) (

Auto) 1.9, Eosinophils (%) (Auto) 0.2, Basophils (%) (Auto) 0.0, Neutrophils # (

Auto) 4.91, Lymphocytes # (Auto) 0.31, Monocytes # (Auto) 0.10, Eosinophils # (

Auto) 0.01, Basophils # (Auto) 0.00





5/26/17 06:20

















Test


  5/25/17


14:35 5/25/17


16:45 5/26/17


06:20 5/26/17


11:37


 


Prothrombin Time


  11.6 SECONDS


(9.0-12.0) 


  


  


 


 


Prothromb Time International


Ratio 1.1 (0.9-1.1) 


  


  


  


 


 


Urine Color  YELLOW   


 


Urine Appearance  CLEAR (CLEAR)   


 


Urine pH  5.0 (4.5-7.5)   


 


Urine Specific Gravity


  


  1.010


(1.000-1.030) 


  


 


 


Urine Protein  NEG (NEG)   


 


Urine Glucose (UA)  NEG (NEG)   


 


Urine Ketones  NEG (NEG)   


 


Urine Occult Blood  NEG (NEG)   


 


Urine Nitrite  NEG (NEG)   


 


Urine Bilirubin  NEG (NEG)   


 


Urine Urobilinogen  NEG (NEG)   


 


Urine Leukocyte Esterase  TRACE (NEG)   


 


Urine WBC (Auto)


  


  10-30 /hpf


(0-5) 


  


 


 


Urine RBC (Auto)  0-4 /hpf (0-4)   


 


Urine Hyaline Casts (Auto)  1-5 /lpf (0-5)   


 


Urine Epithelial Cells (Auto)


  


  20-30 /lpf


(0-5) 


  


 


 


Urine Bacteria (Auto)  NEG (NEG)   


 


White Blood Count


  


  


  5.35 K/uL


(4.8-10.8) 


 


 


Red Blood Count


  


  


  3.10 M/uL


(4.2-5.4) 


 


 


Hemoglobin


  


  


  10.0 g/dL


(12.0-16.0) 


 


 


Hematocrit   31.1 % (37-47)  


 


Mean Corpuscular Volume


  


  


  100.3 fL


() 


 


 


Mean Corpuscular Hemoglobin


  


  


  32.3 pg


(25-34) 


 


 


Mean Corpuscular Hemoglobin


Concent 


  


  32.2 g/dl


(32-36) 


 


 


Platelet Count


  


  


  119 K/uL


(130-400) 


 


 


Mean Platelet Volume


  


  


  10.9 fL


(7.4-10.4) 


 


 


Neutrophils (%) (Auto)   91.7 %  


 


Lymphocytes (%) (Auto)   5.8 %  


 


Monocytes (%) (Auto)   1.9 %  


 


Eosinophils (%) (Auto)   0.2 %  


 


Basophils (%) (Auto)   0.0 %  


 


Neutrophils # (Auto)


  


  


  4.91 K/uL


(1.4-6.5) 


 


 


Lymphocytes # (Auto)


  


  


  0.31 K/uL


(1.2-3.4) 


 


 


Monocytes # (Auto)


  


  


  0.10 K/uL


(0.11-0.59) 


 


 


Eosinophils # (Auto)


  


  


  0.01 K/uL


(0-0.5) 


 


 


Basophils # (Auto)


  


  


  0.00 K/uL


(0-0.2) 


 


 


RDW Standard Deviation


  


  


  48.1 fL


(36.4-46.3) 


 


 


RDW Coefficient of Variation


  


  


  13.2 %


(11.5-14.5) 


 


 


Immature Granulocyte % (Auto)   0.4 %  


 


Immature Granulocyte # (Auto)


  


  


  0.02 K/uL


(0.00-0.02) 


 


 


Anion Gap


  


  


  5.0 mmol/L


(3-11) 


 


 


Est Creatinine Clear Calc


Drug Dose 


  


  40.7 ml/min 


  


 


 


Estimated GFR (


American) 


  


  42.8 


  


 


 


Estimated GFR (Non-


American 


  


  36.9 


  


 


 


BUN/Creatinine Ratio   16.5 (10-20)  


 


Estimated Average Glucose   143 mg/dl  


 


Hemoglobin A1c


  


  


  6.6 %


(4.5-5.6) 


 


 


Calcium Level


  


  


  8.1 mg/dl


(8.5-10.1) 


 


 


Bedside Glucose


  


  


  


  154 mg/dl


(70-90)








Last 24 Hours








Test


  5/25/17


14:20 5/25/17


14:35 5/25/17


16:45 5/25/17


16:48


 


White Blood Count 8.41 K/uL    


 


Red Blood Count 3.49 M/uL    


 


Hemoglobin 11.0 g/dL    


 


Hematocrit 35.5 %    


 


Mean Corpuscular Volume 101.7 fL    


 


Mean Corpuscular Hemoglobin 31.5 pg    


 


Mean Corpuscular Hemoglobin


Concent 31.0 g/dl 


  


  


  


 


 


Platelet Count 147 K/uL    


 


Mean Platelet Volume 10.7 fL    


 


Neutrophils (%) (Auto) 83.8 %    


 


Lymphocytes (%) (Auto) 9.4 %    


 


Monocytes (%) (Auto) 5.4 %    


 


Eosinophils (%) (Auto) 1.1 %    


 


Basophils (%) (Auto) 0.2 %    


 


Neutrophils # (Auto) 7.05 K/uL    


 


Lymphocytes # (Auto) 0.79 K/uL    


 


Monocytes # (Auto) 0.45 K/uL    


 


Eosinophils # (Auto) 0.09 K/uL    


 


Basophils # (Auto) 0.02 K/uL    


 


RDW Standard Deviation 50.3 fL    


 


RDW Coefficient of Variation 13.5 %    


 


Immature Granulocyte % (Auto) 0.1 %    


 


Immature Granulocyte # (Auto) 0.01 K/uL    


 


Sodium Level 144 mmol/L    


 


Potassium Level 3.7 mmol/L    


 


Chloride Level 102 mmol/L    


 


Carbon Dioxide Level 39 mmol/L    


 


Anion Gap 3.0 mmol/L    


 


Blood Urea Nitrogen 14 mg/dl    


 


Creatinine 1.30 mg/dl    


 


Estimated GFR (


American) 46.8 


  


  


  


 


 


Estimated GFR (Non-


American 40.4 


  


  


  


 


 


BUN/Creatinine Ratio 11.1    


 


Random Glucose 162 mg/dl    


 


Calcium Level 8.9 mg/dl    


 


Prothrombin Time  11.6 SECONDS   


 


Prothromb Time International


Ratio 


  1.1 


  


  


 


 


Urine Color   YELLOW  


 


Urine Appearance   CLEAR  


 


Urine pH   5.0  


 


Urine Specific Gravity   1.010  


 


Urine Protein   NEG  


 


Urine Glucose (UA)   NEG  


 


Urine Ketones   NEG  


 


Urine Occult Blood   NEG  


 


Urine Nitrite   NEG  


 


Urine Bilirubin   NEG  


 


Urine Urobilinogen   NEG  


 


Urine Leukocyte Esterase   TRACE  


 


Urine WBC (Auto)   10-30 /hpf  


 


Urine RBC (Auto)   0-4 /hpf  


 


Urine Hyaline Casts (Auto)   1-5 /lpf  


 


Urine Epithelial Cells (Auto)   20-30 /lpf  


 


Urine Bacteria (Auto)   NEG  


 


Bedside Glucose    203 mg/dl 














Test


  5/25/17


20:10 5/25/17


23:50 5/26/17


03:51 5/26/17


06:20


 


Bedside Glucose 199 mg/dl  252 mg/dl  269 mg/dl  


 


White Blood Count    5.35 K/uL 


 


Red Blood Count    3.10 M/uL 


 


Hemoglobin    10.0 g/dL 


 


Hematocrit    31.1 % 


 


Mean Corpuscular Volume    100.3 fL 


 


Mean Corpuscular Hemoglobin    32.3 pg 


 


Mean Corpuscular Hemoglobin


Concent 


  


  


  32.2 g/dl 


 


 


Platelet Count    119 K/uL 


 


Mean Platelet Volume    10.9 fL 


 


Neutrophils (%) (Auto)    91.7 % 


 


Lymphocytes (%) (Auto)    5.8 % 


 


Monocytes (%) (Auto)    1.9 % 


 


Eosinophils (%) (Auto)    0.2 % 


 


Basophils (%) (Auto)    0.0 % 


 


Neutrophils # (Auto)    4.91 K/uL 


 


Lymphocytes # (Auto)    0.31 K/uL 


 


Monocytes # (Auto)    0.10 K/uL 


 


Eosinophils # (Auto)    0.01 K/uL 


 


Basophils # (Auto)    0.00 K/uL 


 


RDW Standard Deviation    48.1 fL 


 


RDW Coefficient of Variation    13.2 % 


 


Immature Granulocyte % (Auto)    0.4 % 


 


Immature Granulocyte # (Auto)    0.02 K/uL 


 


Sodium Level    142 mmol/L 


 


Potassium Level    4.2 mmol/L 


 


Chloride Level    100 mmol/L 


 


Carbon Dioxide Level    37 mmol/L 


 


Anion Gap    5.0 mmol/L 


 


Blood Urea Nitrogen    23 mg/dl 


 


Creatinine    1.40 mg/dl 


 


Est Creatinine Clear Calc


Drug Dose 


  


  


  40.7 ml/min 


 


 


Estimated GFR (


American) 


  


  


  42.8 


 


 


Estimated GFR (Non-


American 


  


  


  36.9 


 


 


BUN/Creatinine Ratio    16.5 


 


Random Glucose    215 mg/dl 


 


Estimated Average Glucose    143 mg/dl 


 


Hemoglobin A1c    6.6 % 


 


Calcium Level    8.1 mg/dl 














Test


  5/26/17


07:36 5/26/17


11:37 


  


 


 


Bedside Glucose 184 mg/dl  154 mg/dl   











Assessment and Plan


71 y/o female with PMHx of COPD, chronic diastolic CHF, DM II, HTN, and HLD who 

presents as a direct admission from the pulmonary clinic per request of Gray Robert PA-C.   The paient has been admitted for multiple COPD exacerbations 

within the past year, this being the third in hospital stay.  





Acute on chronic respiratory failure with hypoxia secondary to COPD 

exacerbation and possibly mild decompensation of Diastolic CHF


- Admitted to [facility] 


-pulm consult appreciated:


   continue Solumedrol 40 mg IV q 8 h


   hold off on ABX for now (no fever, WBC)


   continue scheduled duonebs


-goal O2 88-92%


-continue Mucinex 600 mg po BID








Diabetes mellitus type 2


-pharmacy managing





HTN


-Continue metoprolol succinate 100 mg PO qd


-hold lisinopril 10 mg PO qd temporarily for slight bump in CR and anticipated 

diuresis





Diastolic CHF-pt admits to not taking lasix as prescribed.  She takes 20 mg 

daily instead of 40 mg daily d/t frequent urination and incontinence


-Continue Lasix 20 mg po daily


-Give one dose of Lasix 20 mg IV now


-I&O monitoring


-daily standing scale weights





Urinary incontinence-->? secondary to Lasix vs infectious etiology or urge 

incontinence


-UA 10 -30 WBCs-->await urine culture


-consider addition of  Oxybutynin





HLD


-Continue pravastatin 10 mg PO qd





Restless leg syndrome


-Continue ropinirole 2 mg PO TID





Depression


-Continue fluoxetine 20 mg PO qd





Anemia, iron deficiency


-Continue iron supplement 325 mg PO qd


-Continue B12 supplemental 1000 g PO qd





CKD, stage III, baseline cr. ~1.6


-follow PRP





DVT prophylaxis: -GEETHA and SCDs, Lovenox 40 mg subq





CODE STATUS: FULL CODE





Disposition: Pt from home, discharge when medically stable

## 2017-05-26 NOTE — PHARMACY PROGRESS NOTE
Glycemic Control:  Progress Nt


Date of Service


May 26, 2017.





Scope


Glycemic Pharmacist consulted by for glycemic control and to write orders per 

[facility] inpatient glycemic control protocol.





Objective


Accuchecks BSG (last 24hrs):











Test


  5/25/17


14:20 5/25/17


16:48 5/25/17


20:10 5/25/17


23:50


 


Random Glucose


  162 mg/dl


(70-99) 


  


  


 


 


Bedside Glucose


  


  203 mg/dl


(70-90) 199 mg/dl


(70-90) 252 mg/dl


(70-90)














Test


  5/26/17


03:51 5/26/17


06:20 5/26/17


07:36 


 


 


Bedside Glucose


  269 mg/dl


(70-90) 


  184 mg/dl


(70-90) 


 


 


Random Glucose


  


  215 mg/dl


(70-99) 


  


 








Laboratory Data (last 24hrs)





HbA1c:





 








Test


  5/26/17


06:20


 


Hemoglobin A1c


  6.6 %


(4.5-5.6)  H











Recent Pertinent Medications


Outpatient Anti-diabetic Regimen:


* Glipizide 5mg PO BIDM 











The patient is currently receiving:


* Basal insulin:             Lantus 20-25 units every 12 hours depending on BSG 


                    20 units if BSG < 160 


                    25 units if BSG > 160





* Correctional Insulin:   Novolog Correction per scale ACHS


                          Goal Range: Low 110 mg/dL - High 140 mg/dL


                          Correction Factor: 25 mg/dL/unit





* Prandial insulin:         Per carb ratio of 1 unit per 8 grams CHO consumed








* Oral Agents:           On hold for admission 








Risk Factors for Insulin Resistance:


* Steroids


* Diet





Assessment & Plan


ASSESSMENT:


Initial:


* Pt is maintained on oral antidiabetic agents as an outpatient with adequate 

control per recent A1c. No changes needed at discharge. 


 * Oral agents are not recommended for inpatient use d/t drug interactions, 

changing PO intake, and difficulty titrating for acute hyper/hypoglycemia. ADA 

recommends re-initiating outpatient oral agents 1-2 days prior to discharge if/

when appropriate if they were held on admission. 


 * Will hold oral agents for admission and utilize SQ basal bolus insulin 

regimen which is the recommended regimen for inpatient glycemic control.


* Pt known to pharmacy from previous admissions/glycemic consults


 * Patient was admitted to [facility] in January of this year. She was on the same 

dose of steroids (40 mg IV q8) and required 26 units of Lantus BID plus 11-32 

units of bolus insulin per day.


5/26/17:


* Pt ordered high dose RTC steroids --> Solumedrol 40mg IV Q8hrs


* Will utilize weight/high stress based dosing and titrate based on BSG trends 

and steroid dosing


* Pt has required 86 units of insulin over the past 24hrs.


 * 50 units of basal insulin


 * 36 units of prandial insulin (lower d/t no CHO intake overnight)


* BSGs ranging 184 - 269mg/dl over the past 24hrs


 * Continue high stress basal insulin dosing


 * Initial bolus insulin dosing was based on stress = 2, now that solumedrol is 

being scheduled Q8hrs, will increase to high stress bolus insulin parameters. 

Steroids have their most profound effect on postprandial hyperglycemia 

therefore aggressive CF/CR warranted. 











PLAN FOR INPATIENT GLYCEMIC CONTROL:


* Hold outpatient oral diabetes medications





* Continue Basal insulin with LANTUS 


 * 20-25 units (20 if BSG is less than 160, 25 units for  mg/dL or more) 

SQ Q12hrs 





* Tighten Bolus Insulin with NOVOLOG ACHS 


 * Goal Range:  Low 110 mg/dL - High 140 mg/dL


 * Correction Factor: 15 mg/dL/unit


 * Nutritional / Prandial insulin per carb ratio of 1 unit per 5 grams CHO 

consumed








* Please note that the plan above was derived based on current level of insulin 

resistance and hospital stress. These recommendations are appropriate for 

inpatient admission only. Plan of care upon discharge will need to be 

reassessed to avoid potential outpatient hypo/hyperglycemia. 








Thank you.

## 2017-05-26 NOTE — PULMONOLOGY PROGRESS NOTE
Pulmonary Progress Note


Date of Service


May 26, 2017.





Attending


Dr. Velasquez





Subjective


Some improvement today. Ambulating inside the room, but desaturating easily.


Less dyspneic though.


States that her rhinorrhea and cough resolved in the hospital





Objective


General Appearance:  no distress, obese


Eyes:  PERRLA


ENT:  NORMAL THROAT EXAM


Neck:  NO TENDERNESS, TRACHEA MIDLINE


Respiratory:  faint wheezing, very diminished breath sounds


Cardiovasular:  REGULAR RATE/RHYTHM, NORMAL S1S2


Abdomen:  NON TENDER, NO REBOUND, NO GUARDING


Upper Extremities:  NO EDEMA


Lower Extremities:  edema (bilateral)


Neuro:  ALERT, ORIENTED x 3





Assessment & Plan


72 year old female with advanced emphysema, presents with an exacerbation 

episode.





Plan: Continue steroids, bronchodilators around the clock


No immediate need for Abx, WBC normal, afebrile. The CXR likely shows basilar 

atelectasis.


Supplement O2.


Ambulate as tolerated, increase the O2 before/


Recommend long term Prednisone therapy after the taper. To be followed up as 

outpatient. 


Continue diuretics. Has pulmonary HTN based on RHC from 2015, PAP 52/21. 

Probably secondary to pulmonary disease, not amenable to PH therapy since it is 

not group I.


Patient is looking into an assisted living, I think it is a good idea, plus, 

another benefit would be removal of a possible offending agent that is 

contributing to rhinorrhea and cough


DVT prophylaxis





Data


Medications:





 Current Inpatient Medications








 Medications


  (Trade)  Dose


 Ordered  Sig/Roya


 Route  Start Time


 Stop Time Status Last Admin


Dose Admin


 


 Enoxaparin Sodium


  (Lovenox Inj)  40 mg  Q24H


 SQ  5/25/17 21:00


 6/24/17 20:59  5/25/17 20:29


40 MG


 


 Acetaminophen


  (Tylenol Tab)  650 mg  Q4H  PRN


 PO  5/25/17 13:45


 6/24/17 13:44  5/25/17 23:53


650 MG


 


 Polyethylene


  (Miralax Powder


 Packet)  17 gm  DAILY  PRN


 PO  5/25/17 14:00


 6/24/17 13:59   


 


 


 Ondansetron HCl


  (Zofran Inj)  4 mg  Q6H  PRN


 IV  5/25/17 13:45


 6/24/17 13:44   


 


 


 Cyanocobalamin


  (Vitamin B-12


 Tab)  1,000 mcg  DAILY


 PO  5/26/17 08:00


 6/25/17 07:59  5/26/17 07:56


1,000 MCG


 


 Fluoxetine HCl


  (Prozac Cap)  20 mg  QAM


 PO  5/26/17 08:00


 6/25/17 07:59  5/26/17 07:56


20 MG


 


 Furosemide


  (Lasix Tab)  20 mg  DAILY


 PO  5/26/17 08:00


 6/25/17 07:59  5/26/17 07:55


20 MG


 


 Lisinopril


  (Zestril Tab)  10 mg  DAILY


 PO  5/26/17 08:00


 6/25/17 07:59 Future Hold  


 


 


 Metoprolol


 Succinate


  (Toprol Xl Tab)  100 mg  DAILY


 PO  5/26/17 08:00


 6/25/17 07:59  5/26/17 07:53


100 MG


 


 Pravastatin Sodium


  (Pravachol Tab)  10 mg  DAILY


 PO  5/26/17 08:00


 6/25/17 07:59  5/26/17 07:55


10 MG


 


 Ropinirole HCl


  (Requip Tab)  2 mg  TID


 PO  5/25/17 14:00


 6/24/17 13:59  5/26/17 13:02


2 MG


 


 Spironolactone


  (Aldactone Tab)  12.5 mg  QAM


 PO  5/26/17 08:00


 6/25/17 07:59  5/26/17 07:57


12.5 MG


 


 Tramadol HCl


  (Ultram Tab)  50 mg  Q6  PRN


 PO  5/25/17 13:45


 6/24/17 13:44   


 


 


 Ferrous Sulfate


  (Feosol Tab)  325 mg  DAILY


 PO  5/26/17 08:00


 6/25/17 07:59  5/26/17 07:57


325 MG


 


 Insulin Aspart


  (novoLOG ASPART)  **SLIDING


 SCALE**


 **If C...  ACHS


 SC  5/25/17 16:30


 6/24/17 16:29  5/26/17 13:00


11 UNITS


 


 Glucose


  (Glucose 40% Gel)  15-30


 GRAMS 15


 GRAMS...  UD  PRN


 PO  5/25/17 13:45


 6/24/17 13:44   


 


 


 Glucose


  (Glucose Chew


 Tab)  4-8


 Tablets 4


 Tabl...  UD  PRN


 PO  5/25/17 13:45


 6/24/17 13:44   


 


 


 Dextrose


  (Dextrose 50%


 50ML Syringe)  25-50ML OF


 50% DW IV


 FOR...  UD  PRN


 IV  5/25/17 13:45


 6/24/17 13:44   


 


 


 Glucagon


  (Glucagon Inj)  1 mg  UD  PRN


 SQ  5/25/17 13:45


 6/24/17 13:44   


 


 


 Miscellaneous


 Information


  (Consult


 Glycemic


 Management


 Pharmacy)  1 ea  UD  PRN


 N/A  5/25/17 14:00


 6/24/17 13:59   


 


 


 Insulin Glargine


  (Lantus Solostar


 Pen)  SEE


 PROTOCOL


 TEXT ...  BID


 SC  5/26/17 08:00


 6/25/17 07:59  5/26/17 09:01


25 UNIT


 


 Insulin Aspart


  (novoLOG ASPART)  **SLIDING


 SCALE**


 **If C...  0000,0400


 SC  5/26/17 00:00


 6/25/17 00:00  5/26/17 03:54


6 UNITS


 


 Guaifenesin


  (Organidin Nr


 Tab)  200 mg  Q4H  PRN


 PO  5/25/17 15:30


 6/24/17 15:29   


 


 


 Ipratropium


 Bromide


  (Atrovent 0.02%


 0.5MG/2.5ML Neb)  0.5 mg  Q6R


 INH  5/25/17 21:00


 6/24/17 20:59  5/26/17 14:09


0.5 MG


 


 Levalbuterol 1.25


 mg  1.25 mg  Q6R


 INH  5/25/17 21:00


 6/24/17 20:59  5/26/17 14:09


1.25 MG


 


 Methylprednisolone


 Sodium Succinate


 40 mg/Syringe  0.64 ml @ 


 1.5 mls/min  Q8


 IV  5/26/17 14:00


 6/25/17 13:59  5/26/17 13:02


1.5 MLS/MIN


 


 Furosemide/Syringe


  (Lasix Inj/


 Syringe)  2 ml @ 4


 mls/min  TODAY@1500


 IV  5/26/17 15:00


 5/26/17 17:00   


 


 


 Hydralazine HCl


  (HydrALAZINE INJ)  10 mg  Q6H  PRN


 IV.  5/26/17 14:30


 6/25/17 14:29   


 








I & O:











 24-Hour Column 


 


 5/26/17





 08:00


 


Intake Total 240 ml


 


Output Total 400 ml


 


Balance -160 ml








Vital Signs:











  Date Time  Temp Pulse Resp B/P Pulse Ox O2 Delivery O2 Flow Rate FiO2


 


5/26/17 14:30 36.9 69 16 162/87 93  3.0 


 


5/26/17 14:19  68   92   


 


5/26/17 14:09  68 16  90 Nasal Cannula 3.0 


 


5/26/17 08:00     94 Nasal Cannula 3.0 


 


5/26/17 07:28  66 16  94 Nasal Cannula 3.0 


 


5/26/17 07:18 36.6 56 20 148/79 97 Room Air  


 


5/26/17 01:40  96 16  93 Nasal Cannula 3.0 


 


5/26/17 00:01      Nasal Cannula 4.0 


 


5/25/17 23:43 36.8 72 18 158/79 91 Nasal Cannula 2.0 


 


5/25/17 19:30      Nasal Cannula 4.0 


 


5/25/17 19:24  96 26  93 Nasal Cannula 3.0 


 


5/25/17 15:19  89 22  95 Nasal Cannula 3.0 








Laboratory Results:





Last 24 Hours








Test


  5/25/17


16:45 5/25/17


16:48 5/25/17


20:10 5/25/17


23:50


 


Urine Color YELLOW    


 


Urine Appearance CLEAR    


 


Urine pH 5.0    


 


Urine Specific Gravity 1.010    


 


Urine Protein NEG    


 


Urine Glucose (UA) NEG    


 


Urine Ketones NEG    


 


Urine Occult Blood NEG    


 


Urine Nitrite NEG    


 


Urine Bilirubin NEG    


 


Urine Urobilinogen NEG    


 


Urine Leukocyte Esterase TRACE    


 


Urine WBC (Auto) 10-30 /hpf    


 


Urine RBC (Auto) 0-4 /hpf    


 


Urine Hyaline Casts (Auto) 1-5 /lpf    


 


Urine Epithelial Cells (Auto) 20-30 /lpf    


 


Urine Bacteria (Auto) NEG    


 


Bedside Glucose  203 mg/dl  199 mg/dl  252 mg/dl 














Test


  5/26/17


03:51 5/26/17


06:20 5/26/17


07:36 5/26/17


11:37


 


Bedside Glucose 269 mg/dl   184 mg/dl  154 mg/dl 


 


White Blood Count  5.35 K/uL   


 


Red Blood Count  3.10 M/uL   


 


Hemoglobin  10.0 g/dL   


 


Hematocrit  31.1 %   


 


Mean Corpuscular Volume  100.3 fL   


 


Mean Corpuscular Hemoglobin  32.3 pg   


 


Mean Corpuscular Hemoglobin


Concent 


  32.2 g/dl 


  


  


 


 


Platelet Count  119 K/uL   


 


Mean Platelet Volume  10.9 fL   


 


Neutrophils (%) (Auto)  91.7 %   


 


Lymphocytes (%) (Auto)  5.8 %   


 


Monocytes (%) (Auto)  1.9 %   


 


Eosinophils (%) (Auto)  0.2 %   


 


Basophils (%) (Auto)  0.0 %   


 


Neutrophils # (Auto)  4.91 K/uL   


 


Lymphocytes # (Auto)  0.31 K/uL   


 


Monocytes # (Auto)  0.10 K/uL   


 


Eosinophils # (Auto)  0.01 K/uL   


 


Basophils # (Auto)  0.00 K/uL   


 


RDW Standard Deviation  48.1 fL   


 


RDW Coefficient of Variation  13.2 %   


 


Immature Granulocyte % (Auto)  0.4 %   


 


Immature Granulocyte # (Auto)  0.02 K/uL   


 


Sodium Level  142 mmol/L   


 


Potassium Level  4.2 mmol/L   


 


Chloride Level  100 mmol/L   


 


Carbon Dioxide Level  37 mmol/L   


 


Anion Gap  5.0 mmol/L   


 


Blood Urea Nitrogen  23 mg/dl   


 


Creatinine  1.40 mg/dl   


 


Est Creatinine Clear Calc


Drug Dose 


  40.7 ml/min 


  


  


 


 


Estimated GFR (


American) 


  42.8 


  


  


 


 


Estimated GFR (Non-


American 


  36.9 


  


  


 


 


BUN/Creatinine Ratio  16.5   


 


Random Glucose  215 mg/dl   


 


Estimated Average Glucose  143 mg/dl   


 


Hemoglobin A1c  6.6 %   


 


Calcium Level  8.1 mg/dl

## 2017-05-26 NOTE — CLINICAL DOCUMENTATION QUERY
IRAIDA Villanueva :



**** CLINICAL DOCUMENTATION QUERY****



Patient is a 72 year old female admitted for acute COPD exacerbation as a direct admission 
from the pulmonary clinic.  Documentation includes home use of 3 L/min of supplemental O2 
chronically.  As appropriate, consider documentation as suggested below to capture the 
severity of illness associated with this clinical diagnosis.  Thank you. 



In your clinical opinion is this patient being managed for:

    

                        (x  ) Chronic respiratory failure with hypoxia

                        (  ) Other explanation of clinical findings (Please Explain)

                        (  ) Unable to determine (Please Define)

                        (  ) Need to Discuss

                        (  ) Not Agree



The medical record reflects the following clinical findings, treatment, and risk factors.  
  



Clinical Indicators: As above

Treatment:  3 L/min of supplemental O2 chronically

Risk Factors: Chronic diastolic CHF, COPD, smoking history



Please clarify and document your clinical opinion in the progress notes and discharge 
summary. Terms such as "probable", "suspected", "likely", "questionable", "possible", or 
"still to be ruled out" are acceptable. 



*****IF IN AGREEMENT, YOU MUST DOCUMENT ABOVE DIAGNOSTIC STATEMENT IN DAILY PROGRESS NOTES 
AND DISCHARGE SUMMARY. This document is not part of the patient's record.*****

Thank You, Jason Silvestre, JOSE RAUL [phone]

## 2017-05-27 VITALS — DIASTOLIC BLOOD PRESSURE: 71 MMHG | HEART RATE: 68 BPM | SYSTOLIC BLOOD PRESSURE: 123 MMHG

## 2017-05-27 VITALS — HEART RATE: 66 BPM | OXYGEN SATURATION: 95 %

## 2017-05-27 VITALS — DIASTOLIC BLOOD PRESSURE: 93 MMHG | SYSTOLIC BLOOD PRESSURE: 161 MMHG | HEART RATE: 63 BPM

## 2017-05-27 VITALS
HEART RATE: 63 BPM | DIASTOLIC BLOOD PRESSURE: 92 MMHG | SYSTOLIC BLOOD PRESSURE: 170 MMHG | OXYGEN SATURATION: 100 % | TEMPERATURE: 97.88 F

## 2017-05-27 VITALS
HEART RATE: 63 BPM | OXYGEN SATURATION: 94 % | TEMPERATURE: 97.52 F | DIASTOLIC BLOOD PRESSURE: 90 MMHG | SYSTOLIC BLOOD PRESSURE: 160 MMHG

## 2017-05-27 VITALS — OXYGEN SATURATION: 100 % | HEART RATE: 70 BPM

## 2017-05-27 VITALS — HEART RATE: 72 BPM | OXYGEN SATURATION: 96 %

## 2017-05-27 VITALS
TEMPERATURE: 97.88 F | DIASTOLIC BLOOD PRESSURE: 80 MMHG | HEART RATE: 59 BPM | OXYGEN SATURATION: 98 % | SYSTOLIC BLOOD PRESSURE: 145 MMHG

## 2017-05-27 VITALS — OXYGEN SATURATION: 95 %

## 2017-05-27 VITALS — OXYGEN SATURATION: 95 % | HEART RATE: 66 BPM

## 2017-05-27 LAB
ANION GAP SERPL CALC-SCNC: 7 MMOL/L (ref 3–11)
BASOPHILS # BLD: 0 K/UL (ref 0–0.2)
BASOPHILS NFR BLD: 0 %
BUN SERPL-MCNC: 37 MG/DL (ref 7–18)
BUN/CREAT SERPL: 23.2 (ref 10–20)
CALCIUM SERPL-MCNC: 8.2 MG/DL (ref 8.5–10.1)
CHLORIDE SERPL-SCNC: 96 MMOL/L (ref 98–107)
CO2 SERPL-SCNC: 36 MMOL/L (ref 21–32)
COMPLETE: YES
CREAT CL PREDICTED SERPL C-G-VRATE: 35.7 ML/MIN
CREAT SERPL-MCNC: 1.6 MG/DL (ref 0.6–1.2)
EOSINOPHIL NFR BLD AUTO: 133 K/UL (ref 130–400)
GLUCOSE SERPL-MCNC: 205 MG/DL (ref 70–99)
HCT VFR BLD CALC: 32.7 % (ref 37–47)
IG%: 0.3 %
IMM GRANULOCYTES NFR BLD AUTO: 4.9 %
LYMPHOCYTES # BLD: 0.33 K/UL (ref 1.2–3.4)
MCH RBC QN AUTO: 31.2 PG (ref 25–34)
MCHC RBC AUTO-ENTMCNC: 31.2 G/DL (ref 32–36)
MCV RBC AUTO: 100 FL (ref 80–100)
MONOCYTES NFR BLD: 1.6 %
NEUTROPHILS # BLD AUTO: 0 %
NEUTROPHILS NFR BLD AUTO: 93.2 %
PMV BLD AUTO: 10.9 FL (ref 7.4–10.4)
POTASSIUM SERPL-SCNC: 4.3 MMOL/L (ref 3.5–5.1)
RBC # BLD AUTO: 3.27 M/UL (ref 4.2–5.4)
SODIUM SERPL-SCNC: 139 MMOL/L (ref 136–145)
WBC # BLD AUTO: 6.77 K/UL (ref 4.8–10.8)

## 2017-05-27 RX ADMIN — INSULIN ASPART SCH UNITS: 100 INJECTION, SOLUTION INTRAVENOUS; SUBCUTANEOUS at 13:47

## 2017-05-27 RX ADMIN — LEVALBUTEROL SCH MG: 1.25 SOLUTION, CONCENTRATE RESPIRATORY (INHALATION) at 01:59

## 2017-05-27 RX ADMIN — IPRATROPIUM BROMIDE SCH MG: 0.5 SOLUTION RESPIRATORY (INHALATION) at 14:35

## 2017-05-27 RX ADMIN — HEPARIN SODIUM SCH UNIT: 10000 INJECTION, SOLUTION INTRAVENOUS; SUBCUTANEOUS at 09:53

## 2017-05-27 RX ADMIN — ROPINIROLE HYDROCHLORIDE SCH MG: 1 TABLET, FILM COATED ORAL at 08:22

## 2017-05-27 RX ADMIN — METHYLPREDNISOLONE SODIUM SUCCINATE SCH MLS/MIN: 1 INJECTION, POWDER, FOR SOLUTION INTRAMUSCULAR; INTRAVENOUS at 05:08

## 2017-05-27 RX ADMIN — HEPARIN SODIUM SCH UNIT: 10000 INJECTION, SOLUTION INTRAVENOUS; SUBCUTANEOUS at 20:53

## 2017-05-27 RX ADMIN — METHYLPREDNISOLONE SODIUM SUCCINATE SCH MLS/MIN: 1 INJECTION, POWDER, FOR SOLUTION INTRAMUSCULAR; INTRAVENOUS at 20:56

## 2017-05-27 RX ADMIN — NYSTATIN SCH ML: 100000 SUSPENSION ORAL at 18:04

## 2017-05-27 RX ADMIN — ROPINIROLE HYDROCHLORIDE SCH MG: 1 TABLET, FILM COATED ORAL at 20:51

## 2017-05-27 RX ADMIN — NYSTATIN SCH ML: 100000 SUSPENSION ORAL at 20:50

## 2017-05-27 RX ADMIN — Medication SCH MCG: at 08:23

## 2017-05-27 RX ADMIN — FERROUS SULFATE TAB EC 325 MG (65 MG FE EQUIVALENT) SCH MG: 325 (65 FE) TABLET DELAYED RESPONSE at 08:21

## 2017-05-27 RX ADMIN — ROPINIROLE HYDROCHLORIDE SCH MG: 1 TABLET, FILM COATED ORAL at 13:53

## 2017-05-27 RX ADMIN — PRAVASTATIN SODIUM SCH MG: 10 TABLET ORAL at 08:21

## 2017-05-27 RX ADMIN — HYDRALAZINE HYDROCHLORIDE SCH MG: 10 TABLET ORAL at 08:21

## 2017-05-27 RX ADMIN — NYSTATIN SCH ML: 100000 SUSPENSION ORAL at 13:48

## 2017-05-27 RX ADMIN — HYDRALAZINE HYDROCHLORIDE SCH MG: 10 TABLET ORAL at 20:51

## 2017-05-27 RX ADMIN — LEVALBUTEROL SCH MG: 1.25 SOLUTION, CONCENTRATE RESPIRATORY (INHALATION) at 07:08

## 2017-05-27 RX ADMIN — FLUOXETINE SCH MG: 20 CAPSULE ORAL at 08:21

## 2017-05-27 RX ADMIN — HYDRALAZINE HYDROCHLORIDE SCH MG: 10 TABLET ORAL at 13:53

## 2017-05-27 RX ADMIN — IPRATROPIUM BROMIDE SCH MG: 0.5 SOLUTION RESPIRATORY (INHALATION) at 07:08

## 2017-05-27 RX ADMIN — LEVALBUTEROL SCH MG: 1.25 SOLUTION, CONCENTRATE RESPIRATORY (INHALATION) at 14:35

## 2017-05-27 RX ADMIN — INSULIN ASPART SCH UNITS: 100 INJECTION, SOLUTION INTRAVENOUS; SUBCUTANEOUS at 18:02

## 2017-05-27 RX ADMIN — IPRATROPIUM BROMIDE SCH MG: 0.5 SOLUTION RESPIRATORY (INHALATION) at 19:00

## 2017-05-27 RX ADMIN — INSULIN ASPART SCH UNITS: 100 INJECTION, SOLUTION INTRAVENOUS; SUBCUTANEOUS at 20:47

## 2017-05-27 RX ADMIN — LEVALBUTEROL SCH MG: 1.25 SOLUTION, CONCENTRATE RESPIRATORY (INHALATION) at 19:00

## 2017-05-27 RX ADMIN — INSULIN ASPART SCH UNITS: 100 INJECTION, SOLUTION INTRAVENOUS; SUBCUTANEOUS at 00:39

## 2017-05-27 RX ADMIN — METOPROLOL SUCCINATE SCH MG: 50 TABLET, EXTENDED RELEASE ORAL at 08:22

## 2017-05-27 RX ADMIN — INSULIN ASPART SCH UNITS: 100 INJECTION, SOLUTION INTRAVENOUS; SUBCUTANEOUS at 05:07

## 2017-05-27 RX ADMIN — METHYLPREDNISOLONE SODIUM SUCCINATE SCH MLS/MIN: 1 INJECTION, POWDER, FOR SOLUTION INTRAMUSCULAR; INTRAVENOUS at 13:49

## 2017-05-27 RX ADMIN — INSULIN ASPART SCH UNITS: 100 INJECTION, SOLUTION INTRAVENOUS; SUBCUTANEOUS at 09:52

## 2017-05-27 RX ADMIN — INSULIN GLARGINE SCH UNIT: 100 INJECTION, SOLUTION SUBCUTANEOUS at 09:52

## 2017-05-27 RX ADMIN — IPRATROPIUM BROMIDE SCH MG: 0.5 SOLUTION RESPIRATORY (INHALATION) at 01:59

## 2017-05-27 NOTE — PHARMACY PROGRESS NOTE
Glycemic Control:  Progress Nt


Date of Service


May 27, 2017.





Scope


Glycemic Pharmacist consulted for glycemic control and to write orders per [facility] 

inpatient glycemic control protocol.





Objective


Accuchecks BSG (last 24hrs):











Test


  5/26/17


11:37 5/26/17


16:34 5/26/17


20:26 5/27/17


00:21


 


Bedside Glucose


  154 mg/dl


(70-90) 82 mg/dl


(70-90) 131 mg/dl


(70-90) 175 mg/dl


(70-90)














Test


  5/27/17


05:01 5/27/17


05:48 5/27/17


07:36 


 


 


Bedside Glucose


  213 mg/dl


(70-90) 


  216 mg/dl


(70-90) 


 


 


Random Glucose


  


  205 mg/dl


(70-99) 


  


 








Laboratory Data (last 24hrs)





HbA1c:





 








Test


  5/26/17


06:20


 


Hemoglobin A1c


  6.6 %


(4.5-5.6)  H











Recent Pertinent Medications


Outpatient Anti-diabetic Regimen:


* Glipizide 5mg PO BIDM 











The patient is currently receiving:


* Basal insulin:             Lantus 20-25 units every 12 hours depending on BSG 


                    20 units if BSG < 160 


                    25 units if BSG > 160





* Correctional Insulin:   Novolog Correction per scale ACHS


                          Goal Range: Low 110 mg/dL - High 140 mg/dL


                          Correction Factor: 15 mg/dL/unit





* Prandial insulin:         Per carb ratio of 1 unit per 5 grams CHO consumed








* Oral Agents:           On hold for admission 








Risk Factors for Insulin Resistance:


* Steroids


* Diet





Assessment & Plan


ASSESSMENT:


Initial:


* Pt is maintained on oral antidiabetic agents as an outpatient with adequate 

control per recent A1c. 


 * Oral agents are not recommended for inpatient use d/t drug interactions, 

changing PO intake, and difficulty titrating for acute hyper/hypoglycemia. ADA 

recommends re-initiating outpatient oral agents 1-2 days prior to discharge if/

when appropriate if they were held on admission. 


 * Will hold oral agents for admission and utilize SQ basal bolus insulin 

regimen which is the recommended regimen for inpatient glycemic control.


* Pt known to pharmacy from previous admissions/glycemic consults


 * Patient was admitted to [facility] in January of this year. She was on the same 

dose of steroids (40 mg IV q8) and required 26 units of Lantus BID plus 11-32 

units of bolus insulin per day.


5/27/17:


* Pt continues on high dose RTC steroids --> Solumedrol 40mg IV Q8hrs


* Initiated SQ basal bolus insulin regimen on 5/25 based on weight/high stress. 

Titrating dosing daily based on BSG trends and steroid dosing


* Pt has required 64+ units of insulin over the past 24hrs.


 * 25 units of basal insulin ... unfortunately, pt refused basal insulin dose 

last evening which resulted in rebound hyperglycemia this morning


 * 39units of prandial/correctional insulin


* BSGs ranging 82 - 216mg/dl over the past 24hrs, goal BSGs are 100-140mg/dl. 


 * Continue high stress basal insulin dosing


 * Steroids have their most profound effect on postprandial hyperglycemia 

therefore aggressive CF/CR warranted. 


 * Will increase AM dosing of basal insulin this morning secondary to rebound 

hyperglycemia. Schedule lower PM dose this evening in hopes that patient will 

not refuse dose. 











PLAN FOR INPATIENT GLYCEMIC CONTROL:  SQ basal bolus insulin regimen based on 

an estimated total daily dose of 70-80 units/day secondary to Solumedrol 40mg 

IV Q8hrs. 


* Hold outpatient oral diabetes medications





* Basal insulin 


 * Lantus 30 units SQ in AM + 10 units in PM if BSG > 140mg/dl 





* Bolus Insulin


 * NovoLog ACHS 


 * Goal Range:  Low 110 mg/dL - High 140 mg/dL


 * Correction Factor: 15 mg/dL/unit


 * Nutritional / Prandial insulin per carb ratio of 1 unit per 5 grams CHO 

consumed





* Taper insulin regimen with each step down in steroid dosing. 





* Please note that the plan above was derived based on current level of insulin 

resistance and hospital stress. These recommendations are appropriate for 

inpatient admission only. Plan of care upon discharge will need to be 

reassessed to avoid potential outpatient hypo/hyperglycemia. 








Thank you.








Looking ahead to discharge:


* Pt is maintained on glipizide as an outpatient with adequate control per 

recent A1c. Insulin most likely not needed at discharge but pt may benefit from 

once daily NPH if high dose prednisone taper is continued at discharge.

## 2017-05-27 NOTE — PROGRESS NOTE
Subjective


Date of Service:


May 27, 2017.


Subjective


Pt evaluation today including:  conversation w/ patient, physical exam, chart 

review, lab review, review of studies, conversation w/ consultant, review of 

inpatient medication list


Sitting up in chair, eating breakfast, reported doing the same, possible 

difficulty breathing is better


Patient worry about oral thrush when she is on current medication


No other complaints





Problem List


Medical Problems:


(1) Cellulitis


Status: Acute  





(2) COPD exacerbation


Status: Acute  





(3) COPD exacerbation


Status: Acute  





(4) Hypoxia


Status: Acute  





(5) SOB (shortness of breath)


Status: Acute  








Review of Systems


Constitutional:  No chills, No fatigue, No fever, No problem reported, No sweats

, No weakness, No weight loss


Eyes:  No diplopia, No discharge, No eye pain, No redness, No worsening of 

vision


ENT:  + tinnitus, No dental problems, No hearing loss, No nasal symptoms, No 

sore throat, No trouble swallowing, No unusual epistaxis


Respiratory:  + cough, + dyspnea on exertion, + shortness of breath, No dyspnea 

at rest, No hemoptysis, No sputum, No wheezing


Cardiac:  No PND, No chest pain, No claudication, No edema, No orthopnea, No 

palpitations


Abdomen:  No constipation, No diarrhea, No nausea, No pain, No vomiting


Musculoskeletal:  No calf pain, No joint pain, No muscle pain, No swelling


Female :  No abnormal vaginal bleeding, No dysuria, No hematuria, No 

incontinence, No urinary frequency, No vaginal discharge


Neurologic:  No balance problems, No memory loss, No numbness/tingling, No 

paralysis, No vertigo, No weakness


Psychiatric:  No anhedonism, No anxiety, No depression symptoms, No insomnia, 

No substance abuse


Heme:  No abnormal bleeding/bruising, No clotting problems, No night sweats, No 

swollen lymph nodes


Endo:  No excessive thirst, No excessive urination, No fatigue


Skin:  No bleeding, No color change, No itch, No new/changing skin lesions, No 

rash





Objective


Vital Signs











  Date Time  Temp Pulse Resp B/P Pulse Ox O2 Delivery O2 Flow Rate FiO2


 


5/27/17 07:17 36.6 63 16 170/92 100 Nasal Cannula 3.5 


 


5/27/17 07:08  70 16  100 Nasal Cannula 3.0 


 


5/27/17 01:59  66 16  95 Nasal Cannula 3.0 


 


5/27/17 00:00     95 Nasal Cannula 3.0 


 


5/26/17 23:55 36.6 57 20 170/90 93 Room Air  


 


5/26/17 18:51  66 16  95 Nasal Cannula 3.0 


 


5/26/17 14:30 36.9 69 16 162/87 93  3.0 


 


5/26/17 14:19  68   92   


 


5/26/17 14:09  68 16  90 Nasal Cannula 3.0 











Physical Exam


General Appearance:  WD/WN, no apparent distress, + obese


Eyes:  normal inspection, PERRL, EOMI, sclerae normal


ENT:  normal ENT inspection, hearing grossly normal, pharynx normal


Neck:  supple, no adenopathy, thyroid normal, no JVD, no carotid bruits, 

trachea midline


Respiratory/Chest:  chest non-tender, normal breath sounds, no respiratory 

distress, no accessory muscle use, + decreased breath sounds


Cardiovascular:  regular rate, rhythm, no edema, no gallop, no JVD, no murmur


Abdomen:  normal bowel sounds, non tender, soft, no organomegaly, no pulsatile 

mass


Extremities:  normal range of motion, non-tender, normal inspection, no pedal 

edema, no calf tenderness, normal capillary refill, pelvis stable


Neurologic/Psychiatric:  CNs II-XII nml as tested, no motor/sensory deficits, 

alert, normal mood/affect, oriented x 3


Skin:  normal color, warm/dry, no rash


Lymphatic:  no adenopathy





Laboratory Results





Last 24 Hours








Test


  5/26/17


11:37 5/26/17


16:34 5/26/17


20:26 5/27/17


00:21


 


Bedside Glucose 154 mg/dl  82 mg/dl  131 mg/dl  175 mg/dl 














Test


  5/27/17


05:01 5/27/17


05:48 5/27/17


07:36 


 


 


Bedside Glucose 213 mg/dl   216 mg/dl  


 


White Blood Count  6.77 K/uL   


 


Red Blood Count  3.27 M/uL   


 


Hemoglobin  10.2 g/dL   


 


Hematocrit  32.7 %   


 


Mean Corpuscular Volume  100.0 fL   


 


Mean Corpuscular Hemoglobin  31.2 pg   


 


Mean Corpuscular Hemoglobin


Concent 


  31.2 g/dl 


  


  


 


 


Platelet Count  133 K/uL   


 


Mean Platelet Volume  10.9 fL   


 


Neutrophils (%) (Auto)  93.2 %   


 


Lymphocytes (%) (Auto)  4.9 %   


 


Monocytes (%) (Auto)  1.6 %   


 


Eosinophils (%) (Auto)  0.0 %   


 


Basophils (%) (Auto)  0.0 %   


 


Neutrophils # (Auto)  6.31 K/uL   


 


Lymphocytes # (Auto)  0.33 K/uL   


 


Monocytes # (Auto)  0.11 K/uL   


 


Eosinophils # (Auto)  0.00 K/uL   


 


Basophils # (Auto)  0.00 K/uL   


 


RDW Standard Deviation  48.0 fL   


 


RDW Coefficient of Variation  13.0 %   


 


Immature Granulocyte % (Auto)  0.3 %   


 


Immature Granulocyte # (Auto)  0.02 K/uL   


 


Sodium Level  139 mmol/L   


 


Potassium Level  4.3 mmol/L   


 


Chloride Level  96 mmol/L   


 


Carbon Dioxide Level  36 mmol/L   


 


Anion Gap  7.0 mmol/L   


 


Blood Urea Nitrogen  37 mg/dl   


 


Creatinine  1.60 mg/dl   


 


Est Creatinine Clear Calc


Drug Dose 


  35.7 ml/min 


  


  


 


 


Estimated GFR (


American) 


  36.4 


  


  


 


 


Estimated GFR (Non-


American 


  31.4 


  


  


 


 


BUN/Creatinine Ratio  23.2   


 


Random Glucose  205 mg/dl   


 


Calcium Level  8.2 mg/dl   











Assessment and Plan


73 y/o female with admitted on May 25 2017 because of COPD exacerbation





COPD exacerbation: Stable and possible improving


Chronic respiratory failure with hypoxia and home O2 dependent


advanced emphysema, 





possibly mild decompensation of Diastolic CHF: Stable


Possible acute on chronic renal failure stage III, today's creatinine is 1.6 

from 1.3 upon admission, although possible in her baseline, however I will stop 

Lasix and Aldactone, and hold her lisinopril, will watch renal function closely





Accelerated hypertension, likely because of holding of lisinopril, has started 

hydrolyzing, scheduled and as needed





Possible oral thrush: Okay to start nystatin mouthwash





PMHx of COPD, chronic diastolic CHF, DM II, HTN, and HLD 


has multiple COPD exacerbations within the past year, this being the third in 

hospital stay.  





continue Solumedrol 40 mg IV q 8 h


not on ABX for now (no fever, WBC)


continue scheduled duonebs


-goal O2 88-92%


-continue Mucinex 600 mg po BID


-I&O monitoring


-daily standing scale weights





Urinary incontinence--> is not new per patient, possible urge incontinence


-UA 10 -30 WBCs-->await urine culture


-consider addition of  Oxybutynin





Pulmonary just recommend long term Prednisone therapy after the taper. To be 

followed up as outpatient.  Discussed her about the long-term prednisone, she 

says she will not do it until talk to Jakob Castellano





Patient is looking into an assisted living,


Disposition: Pt from home, discharge when medically stable


Discussed with patient with present of nurse about a care plan,  discussed the 

risks and benefits,


Continued [facility] stay due to:  multiple IV medications needed


Discharge planning:  home

## 2017-05-27 NOTE — PULMONOLOGY PROGRESS NOTE
Pulmonary Progress Note


Date of Service


May 27, 2017.





Attending


Dr. Velasquez





Subjective


No improvement today.





Objective


General Appearance:  no distress, obese


Eyes:  PERRLA


ENT:  NORMAL THROAT EXAM


Neck:  NO TENDERNESS, TRACHEA MIDLINE


Respiratory:  faint wheezing, very diminished breath sounds


Cardiovasular:  REGULAR RATE/RHYTHM, NORMAL S1S2


Abdomen:  NON TENDER, NO REBOUND, NO GUARDING


Upper Extremities:  NO EDEMA


Lower Extremities:  edema (bilateral)


Neuro:  ALERT, ORIENTED x 3





Assessment & Plan


72 year old female with advanced emphysema, presents with an exacerbation 

episode.





Plan: Continue steroids, bronchodilators around the clock


No need for Abx, WBC normal, afebrile. The CXR likely shows basilar atelectasis.


Supplement O2.


Recommend long term Prednisone therapy after the taper. To be followed up as 

outpatient. 


Will discuss with Gray Robert, he is managing her as outpatient, she is on 

Qvar. Used to be on Spiriva and Tudorza. I am not sure why she is on Qvar now, 

but we'll probably have to escalate the outpatient regimen to include combo 

inhaled steroid/LABA plus a long acting anticholinergic.


Consider adding Daliresp as well


Diuretics on hold, mild bump in creatinine, but not far from her baseline


Has pulmonary HTN based on RHC from 2015, PAP 52/21. Probably secondary to 

pulmonary disease, not amenable to PH therapy since it is not group I.


Patient is looking into an assisted living, I think it is a good idea, plus, 

another benefit would be removal of a possible offending agent that is 

contributing to rhinorrhea and cough


DVT prophylaxis





Data


Medications:





 Current Inpatient Medications








 Medications


  (Trade)  Dose


 Ordered  Sig/Roya


 Route  Start Time


 Stop Time Status Last Admin


Dose Admin


 


 Acetaminophen


  (Tylenol Tab)  650 mg  Q4H  PRN


 PO  5/25/17 13:45


 6/24/17 13:44  5/25/17 23:53


650 MG


 


 Polyethylene


  (Miralax Powder


 Packet)  17 gm  DAILY  PRN


 PO  5/25/17 14:00


 6/24/17 13:59   


 


 


 Ondansetron HCl


  (Zofran Inj)  4 mg  Q6H  PRN


 IV  5/25/17 13:45


 6/24/17 13:44   


 


 


 Cyanocobalamin


  (Vitamin B-12


 Tab)  1,000 mcg  DAILY


 PO  5/26/17 08:00


 6/25/17 07:59  5/27/17 08:23


1,000 MCG


 


 Fluoxetine HCl


  (Prozac Cap)  20 mg  QAM


 PO  5/26/17 08:00


 6/25/17 07:59  5/27/17 08:21


20 MG


 


 Furosemide


  (Lasix Tab)  20 mg  DAILY


 PO  5/26/17 08:00


 6/25/17 07:59 Future Hold 5/26/17 07:55


20 MG


 


 Lisinopril


  (Zestril Tab)  10 mg  DAILY


 PO  5/26/17 08:00


 6/25/17 07:59 Future Hold  


 


 


 Metoprolol


 Succinate


  (Toprol Xl Tab)  100 mg  DAILY


 PO  5/26/17 08:00


 6/25/17 07:59  5/27/17 08:22


100 MG


 


 Pravastatin Sodium


  (Pravachol Tab)  10 mg  DAILY


 PO  5/26/17 08:00


 6/25/17 07:59  5/27/17 08:21


10 MG


 


 Ropinirole HCl


  (Requip Tab)  2 mg  TID


 PO  5/25/17 14:00


 6/24/17 13:59  5/27/17 08:22


2 MG


 


 Spironolactone


  (Aldactone Tab)  12.5 mg  QAM


 PO  5/26/17 08:00


 6/25/17 07:59 Future Hold 5/26/17 07:57


12.5 MG


 


 Tramadol HCl


  (Ultram Tab)  50 mg  Q6  PRN


 PO  5/25/17 13:45


 6/24/17 13:44   


 


 


 Ferrous Sulfate


  (Feosol Tab)  325 mg  DAILY


 PO  5/26/17 08:00


 6/25/17 07:59  5/27/17 08:21


325 MG


 


 Insulin Aspart


  (novoLOG ASPART)  **SLIDING


 SCALE**


 **If C...  ACHS


 SC  5/25/17 16:30


 6/24/17 16:29  5/27/17 09:52


14 UNITS


 


 Glucose


  (Glucose 40% Gel)  15-30


 GRAMS 15


 GRAMS...  UD  PRN


 PO  5/25/17 13:45


 6/24/17 13:44   


 


 


 Glucose


  (Glucose Chew


 Tab)  4-8


 Tablets 4


 Tabl...  UD  PRN


 PO  5/25/17 13:45


 6/24/17 13:44   


 


 


 Dextrose


  (Dextrose 50%


 50ML Syringe)  25-50ML OF


 50% DW IV


 FOR...  UD  PRN


 IV  5/25/17 13:45


 6/24/17 13:44   


 


 


 Glucagon


  (Glucagon Inj)  1 mg  UD  PRN


 SQ  5/25/17 13:45


 6/24/17 13:44   


 


 


 Miscellaneous


 Information


  (Consult


 Glycemic


 Management


 Pharmacy)  1 ea  UD  PRN


 N/A  5/25/17 14:00


 6/24/17 13:59   


 


 


 Insulin Aspart


  (novoLOG ASPART)  **SLIDING


 SCALE**


 **If C...  0000,0400


 SC  5/26/17 00:00


 6/25/17 00:00  5/27/17 05:07


3 UNITS


 


 Guaifenesin


  (Organidin Nr


 Tab)  200 mg  Q4H  PRN


 PO  5/25/17 15:30


 6/24/17 15:29   


 


 


 Ipratropium


 Bromide


  (Atrovent 0.02%


 0.5MG/2.5ML Neb)  0.5 mg  Q6R


 INH  5/25/17 21:00


 6/24/17 20:59  5/27/17 07:08


0.5 MG


 


 Levalbuterol 1.25


 mg  1.25 mg  Q6R


 INH  5/25/17 21:00


 6/24/17 20:59  5/27/17 07:08


1.25 MG


 


 Methylprednisolone


 Sodium Succinate/


 Syringe


  (Solu-Medrol IV/


 Syringe)  0.64 ml @ 


 1.5 mls/min  Q8


 IV  5/26/17 14:00


 6/25/17 13:59  5/27/17 05:08


1.5 MLS/MIN


 


 Insulin Glargine


  (Lantus Solostar


 Pen)  30 unit  DAILY


 SC  5/27/17 08:00


 6/26/17 07:59  5/27/17 09:52


30 UNIT


 


 Hydralazine HCl


  (Apresoline Tab)  10 mg  TID


 PO  5/27/17 08:00


 6/26/17 07:59  5/27/17 08:21


10 MG


 


 Heparin Sodium


  (Porcine)


  (Heparin Sq 5000


 Unit/0.5ml)  5,000 unit  Q12


 SQ  5/27/17 09:00


 6/26/17 08:59  5/27/17 09:53


5,000 UNIT


 


 Nystatin


  (Mycostatin Susp)  10 ml  QID


 PO  5/27/17 12:00


 5/30/17 11:59   


 


 


 Hydralazine HCl


  (HydrALAZINE INJ)  20 mg  Q6H  PRN


 IV.  5/27/17 14:30


 6/26/17 14:29   


 


 


 Insulin Glargine


  (Lantus Solostar


 Pen)  see


 protocol


 text  HS


 SC  5/27/17 21:00


 6/26/17 20:59   


 








I & O:











 24-Hour Column 


 


 5/27/17





 08:00


 


Intake Total 200 ml


 


Balance 200 ml








Vital Signs:











  Date Time  Temp Pulse Resp B/P Pulse Ox O2 Delivery O2 Flow Rate FiO2


 


5/27/17 08:00      Nasal Cannula 3.0 


 


5/27/17 07:17 36.6 63 16 170/92 100 Nasal Cannula 3.5 


 


5/27/17 07:08  70 16  100 Nasal Cannula 3.0 


 


5/27/17 01:59  66 16  95 Nasal Cannula 3.0 


 


5/27/17 00:00     95 Nasal Cannula 3.0 


 


5/26/17 23:55 36.6 57 20 170/90 93 Room Air  


 


5/26/17 18:51  66 16  95 Nasal Cannula 3.0 


 


5/26/17 14:30 36.9 69 16 162/87 93  3.0 


 


5/26/17 14:19  68   92   


 


5/26/17 14:09  68 16  90 Nasal Cannula 3.0 








Laboratory Results:





Last 24 Hours








Test


  5/26/17


16:34 5/26/17


20:26 5/27/17


00:21 5/27/17


05:01


 


Bedside Glucose 82 mg/dl  131 mg/dl  175 mg/dl  213 mg/dl 














Test


  5/27/17


05:48 5/27/17


07:36 5/27/17


11:23 


 


 


White Blood Count 6.77 K/uL    


 


Red Blood Count 3.27 M/uL    


 


Hemoglobin 10.2 g/dL    


 


Hematocrit 32.7 %    


 


Mean Corpuscular Volume 100.0 fL    


 


Mean Corpuscular Hemoglobin 31.2 pg    


 


Mean Corpuscular Hemoglobin


Concent 31.2 g/dl 


  


  


  


 


 


Platelet Count 133 K/uL    


 


Mean Platelet Volume 10.9 fL    


 


Neutrophils (%) (Auto) 93.2 %    


 


Lymphocytes (%) (Auto) 4.9 %    


 


Monocytes (%) (Auto) 1.6 %    


 


Eosinophils (%) (Auto) 0.0 %    


 


Basophils (%) (Auto) 0.0 %    


 


Neutrophils # (Auto) 6.31 K/uL    


 


Lymphocytes # (Auto) 0.33 K/uL    


 


Monocytes # (Auto) 0.11 K/uL    


 


Eosinophils # (Auto) 0.00 K/uL    


 


Basophils # (Auto) 0.00 K/uL    


 


RDW Standard Deviation 48.0 fL    


 


RDW Coefficient of Variation 13.0 %    


 


Immature Granulocyte % (Auto) 0.3 %    


 


Immature Granulocyte # (Auto) 0.02 K/uL    


 


Sodium Level 139 mmol/L    


 


Potassium Level 4.3 mmol/L    


 


Chloride Level 96 mmol/L    


 


Carbon Dioxide Level 36 mmol/L    


 


Anion Gap 7.0 mmol/L    


 


Blood Urea Nitrogen 37 mg/dl    


 


Creatinine 1.60 mg/dl    


 


Est Creatinine Clear Calc


Drug Dose 35.7 ml/min 


  


  


  


 


 


Estimated GFR (


American) 36.4 


  


  


  


 


 


Estimated GFR (Non-


American 31.4 


  


  


  


 


 


BUN/Creatinine Ratio 23.2    


 


Random Glucose 205 mg/dl    


 


Calcium Level 8.2 mg/dl    


 


Bedside Glucose  216 mg/dl  314 mg/dl

## 2017-05-28 VITALS
OXYGEN SATURATION: 91 % | DIASTOLIC BLOOD PRESSURE: 83 MMHG | HEART RATE: 67 BPM | TEMPERATURE: 97.7 F | SYSTOLIC BLOOD PRESSURE: 153 MMHG

## 2017-05-28 VITALS
HEART RATE: 78 BPM | TEMPERATURE: 97.7 F | SYSTOLIC BLOOD PRESSURE: 125 MMHG | DIASTOLIC BLOOD PRESSURE: 71 MMHG | OXYGEN SATURATION: 92 %

## 2017-05-28 VITALS — HEART RATE: 90 BPM | OXYGEN SATURATION: 95 %

## 2017-05-28 VITALS — HEART RATE: 65 BPM | SYSTOLIC BLOOD PRESSURE: 170 MMHG | DIASTOLIC BLOOD PRESSURE: 94 MMHG

## 2017-05-28 VITALS — OXYGEN SATURATION: 96 %

## 2017-05-28 VITALS — HEART RATE: 87 BPM | OXYGEN SATURATION: 96 %

## 2017-05-28 VITALS — HEART RATE: 80 BPM | OXYGEN SATURATION: 96 %

## 2017-05-28 VITALS — OXYGEN SATURATION: 93 % | HEART RATE: 74 BPM

## 2017-05-28 LAB
ANION GAP SERPL CALC-SCNC: 7 MMOL/L (ref 3–11)
BASOPHILS # BLD: 0 K/UL (ref 0–0.2)
BASOPHILS NFR BLD: 0 %
BUN SERPL-MCNC: 47 MG/DL (ref 7–18)
BUN/CREAT SERPL: 33.3 (ref 10–20)
CALCIUM SERPL-MCNC: 8.2 MG/DL (ref 8.5–10.1)
CHLORIDE SERPL-SCNC: 98 MMOL/L (ref 98–107)
CO2 SERPL-SCNC: 35 MMOL/L (ref 21–32)
COMPLETE: YES
CREAT CL PREDICTED SERPL C-G-VRATE: 40.8 ML/MIN
CREAT SERPL-MCNC: 1.4 MG/DL (ref 0.6–1.2)
EOSINOPHIL NFR BLD AUTO: 139 K/UL (ref 130–400)
GLUCOSE SERPL-MCNC: 114 MG/DL (ref 70–99)
HCT VFR BLD CALC: 32.7 % (ref 37–47)
IG%: 0.1 %
IMM GRANULOCYTES NFR BLD AUTO: 4.1 %
LYMPHOCYTES # BLD: 0.29 K/UL (ref 1.2–3.4)
MAGNESIUM SERPL-MCNC: 2.6 MG/DL (ref 1.8–2.4)
MCH RBC QN AUTO: 32.5 PG (ref 25–34)
MCHC RBC AUTO-ENTMCNC: 32.1 G/DL (ref 32–36)
MCV RBC AUTO: 101.2 FL (ref 80–100)
MONOCYTES NFR BLD: 2.8 %
NEUTROPHILS # BLD AUTO: 0 %
NEUTROPHILS NFR BLD AUTO: 93 %
PMV BLD AUTO: 10.9 FL (ref 7.4–10.4)
POTASSIUM SERPL-SCNC: 4.2 MMOL/L (ref 3.5–5.1)
RBC # BLD AUTO: 3.23 M/UL (ref 4.2–5.4)
SODIUM SERPL-SCNC: 140 MMOL/L (ref 136–145)
WBC # BLD AUTO: 7.15 K/UL (ref 4.8–10.8)

## 2017-05-28 RX ADMIN — INSULIN ASPART SCH UNITS: 100 INJECTION, SOLUTION INTRAVENOUS; SUBCUTANEOUS at 04:57

## 2017-05-28 RX ADMIN — METOPROLOL SUCCINATE SCH MG: 50 TABLET, EXTENDED RELEASE ORAL at 09:30

## 2017-05-28 RX ADMIN — HYDRALAZINE HYDROCHLORIDE SCH MG: 10 TABLET ORAL at 20:52

## 2017-05-28 RX ADMIN — TOLTERODINE TARTRATE SCH MG: 2 TABLET, FILM COATED ORAL at 20:53

## 2017-05-28 RX ADMIN — NYSTATIN SCH ML: 100000 SUSPENSION ORAL at 14:15

## 2017-05-28 RX ADMIN — NYSTATIN SCH ML: 100000 SUSPENSION ORAL at 18:07

## 2017-05-28 RX ADMIN — INSULIN GLARGINE SCH UNIT: 100 INJECTION, SOLUTION SUBCUTANEOUS at 08:49

## 2017-05-28 RX ADMIN — INSULIN ASPART SCH UNITS: 100 INJECTION, SOLUTION INTRAVENOUS; SUBCUTANEOUS at 18:06

## 2017-05-28 RX ADMIN — LEVALBUTEROL SCH MG: 1.25 SOLUTION, CONCENTRATE RESPIRATORY (INHALATION) at 18:55

## 2017-05-28 RX ADMIN — IPRATROPIUM BROMIDE SCH MG: 0.5 SOLUTION RESPIRATORY (INHALATION) at 14:05

## 2017-05-28 RX ADMIN — INSULIN ASPART SCH UNITS: 100 INJECTION, SOLUTION INTRAVENOUS; SUBCUTANEOUS at 20:49

## 2017-05-28 RX ADMIN — Medication SCH MCG: at 09:31

## 2017-05-28 RX ADMIN — ROPINIROLE HYDROCHLORIDE SCH MG: 1 TABLET, FILM COATED ORAL at 14:14

## 2017-05-28 RX ADMIN — NYSTATIN SCH ML: 100000 SUSPENSION ORAL at 20:50

## 2017-05-28 RX ADMIN — HYDRALAZINE HYDROCHLORIDE SCH MG: 10 TABLET ORAL at 09:28

## 2017-05-28 RX ADMIN — BUDESONIDE AND FORMOTEROL FUMARATE DIHYDRATE SCH PUFFS: 160; 4.5 AEROSOL RESPIRATORY (INHALATION) at 20:53

## 2017-05-28 RX ADMIN — HEPARIN SODIUM SCH UNIT: 10000 INJECTION, SOLUTION INTRAVENOUS; SUBCUTANEOUS at 08:49

## 2017-05-28 RX ADMIN — HYDRALAZINE HYDROCHLORIDE SCH MG: 10 TABLET ORAL at 14:14

## 2017-05-28 RX ADMIN — NYSTATIN SCH ML: 100000 SUSPENSION ORAL at 09:32

## 2017-05-28 RX ADMIN — LEVALBUTEROL SCH MG: 1.25 SOLUTION, CONCENTRATE RESPIRATORY (INHALATION) at 14:05

## 2017-05-28 RX ADMIN — METHYLPREDNISOLONE SODIUM SUCCINATE SCH MLS/MIN: 1 INJECTION, POWDER, FOR SOLUTION INTRAMUSCULAR; INTRAVENOUS at 14:09

## 2017-05-28 RX ADMIN — ROPINIROLE HYDROCHLORIDE SCH MG: 1 TABLET, FILM COATED ORAL at 09:30

## 2017-05-28 RX ADMIN — IPRATROPIUM BROMIDE SCH MG: 0.5 SOLUTION RESPIRATORY (INHALATION) at 07:42

## 2017-05-28 RX ADMIN — PRAVASTATIN SODIUM SCH MG: 10 TABLET ORAL at 09:29

## 2017-05-28 RX ADMIN — IPRATROPIUM BROMIDE SCH MG: 0.5 SOLUTION RESPIRATORY (INHALATION) at 02:02

## 2017-05-28 RX ADMIN — FERROUS SULFATE TAB EC 325 MG (65 MG FE EQUIVALENT) SCH MG: 325 (65 FE) TABLET DELAYED RESPONSE at 09:28

## 2017-05-28 RX ADMIN — FLUOXETINE SCH MG: 20 CAPSULE ORAL at 09:29

## 2017-05-28 RX ADMIN — INSULIN ASPART SCH UNITS: 100 INJECTION, SOLUTION INTRAVENOUS; SUBCUTANEOUS at 08:48

## 2017-05-28 RX ADMIN — LEVALBUTEROL SCH MG: 1.25 SOLUTION, CONCENTRATE RESPIRATORY (INHALATION) at 07:42

## 2017-05-28 RX ADMIN — ROPINIROLE HYDROCHLORIDE SCH MG: 1 TABLET, FILM COATED ORAL at 20:52

## 2017-05-28 RX ADMIN — INSULIN ASPART SCH UNITS: 100 INJECTION, SOLUTION INTRAVENOUS; SUBCUTANEOUS at 00:00

## 2017-05-28 RX ADMIN — METHYLPREDNISOLONE SODIUM SUCCINATE SCH MLS/MIN: 1 INJECTION, POWDER, FOR SOLUTION INTRAMUSCULAR; INTRAVENOUS at 20:51

## 2017-05-28 RX ADMIN — FUROSEMIDE SCH MG: 20 TABLET ORAL at 09:29

## 2017-05-28 RX ADMIN — HEPARIN SODIUM SCH UNIT: 10000 INJECTION, SOLUTION INTRAVENOUS; SUBCUTANEOUS at 20:55

## 2017-05-28 RX ADMIN — METHYLPREDNISOLONE SODIUM SUCCINATE SCH MLS/MIN: 1 INJECTION, POWDER, FOR SOLUTION INTRAMUSCULAR; INTRAVENOUS at 05:46

## 2017-05-28 RX ADMIN — IPRATROPIUM BROMIDE SCH MG: 0.5 SOLUTION RESPIRATORY (INHALATION) at 18:55

## 2017-05-28 RX ADMIN — INSULIN ASPART SCH UNITS: 100 INJECTION, SOLUTION INTRAVENOUS; SUBCUTANEOUS at 14:07

## 2017-05-28 RX ADMIN — SPIRONOLACTONE SCH MG: 25 TABLET, FILM COATED ORAL at 09:26

## 2017-05-28 RX ADMIN — LEVALBUTEROL SCH MG: 1.25 SOLUTION, CONCENTRATE RESPIRATORY (INHALATION) at 02:02

## 2017-05-28 NOTE — PULMONOLOGY PROGRESS NOTE
Pulmonary Progress Note


Date of Service


May 28, 2017.





Attending


Dr. Velasquez





Subjective


Feels about the same, desaturating in the 70s with minimal exertion.





Objective


General Appearance:  no distress, obese


Eyes:  PERRLA


ENT:  NORMAL THROAT EXAM


Neck:  NO TENDERNESS, TRACHEA MIDLINE


Respiratory:  faint wheezing, very diminished breath sounds


Cardiovasular:  REGULAR RATE/RHYTHM, NORMAL S1S2


Abdomen:  NON TENDER, NO REBOUND, NO GUARDING


Upper Extremities:  NO EDEMA


Lower Extremities:  edema (bilateral)


Neuro:  ALERT, ORIENTED x 3





Assessment & Plan


72 year old female with advanced emphysema, presents with an exacerbation 

episode.





Plan: Continue steroids, bronchodilators around the clock


No need for Abx, WBC normal, afebrile. The CXR likely shows basilar atelectasis.


Supplement O2.


Recommend long term Prednisone therapy after the taper. To be followed up as 

outpatient. 


I discussed with the patient, will start her on Symbicort (she takes Qvar as 

outpatient). I also added Daliresp, I counseled her regarding side effects. 

Will see if it helps her.


Start Tudorza when she is closing in her discharge, meanwhile on short acting 

antimuscarinics


Resumed Lasix.


Has pulmonary HTN based on RHC from 2015, PAP 52/21. Probably secondary to 

pulmonary disease, not amenable to PH therapy since it is not group I.


Patient is looking into an assisted living, I think it is a good idea, plus, 

another benefit would be removal of a possible offending agent that is 

contributing to rhinorrhea and cough


DVT prophylaxis - SC heparin





Data


Medications:





 Current Inpatient Medications








 Medications


  (Trade)  Dose


 Ordered  Sig/Roya


 Route  Start Time


 Stop Time Status Last Admin


Dose Admin


 


 Acetaminophen


  (Tylenol Tab)  650 mg  Q4H  PRN


 PO  5/25/17 13:45


 6/24/17 13:44  5/25/17 23:53


650 MG


 


 Polyethylene


  (Miralax Powder


 Packet)  17 gm  DAILY  PRN


 PO  5/25/17 14:00


 6/24/17 13:59   


 


 


 Ondansetron HCl


  (Zofran Inj)  4 mg  Q6H  PRN


 IV  5/25/17 13:45


 6/24/17 13:44   


 


 


 Cyanocobalamin


  (Vitamin B-12


 Tab)  1,000 mcg  DAILY


 PO  5/26/17 08:00


 6/25/17 07:59  5/28/17 09:31


1,000 MCG


 


 Fluoxetine HCl


  (Prozac Cap)  20 mg  QAM


 PO  5/26/17 08:00


 6/25/17 07:59  5/28/17 09:29


20 MG


 


 Furosemide


  (Lasix Tab)  20 mg  DAILY


 PO  5/26/17 08:00


 6/25/17 07:59 Future hold 5/28/17 09:29


20 MG


 


 Lisinopril


  (Zestril Tab)  10 mg  DAILY


 PO  5/26/17 08:00


 6/25/17 07:59 Future Hold  


 


 


 Metoprolol


 Succinate


  (Toprol Xl Tab)  100 mg  DAILY


 PO  5/26/17 08:00


 6/25/17 07:59  5/28/17 09:30


100 MG


 


 Pravastatin Sodium


  (Pravachol Tab)  10 mg  DAILY


 PO  5/26/17 08:00


 6/25/17 07:59  5/28/17 09:29


10 MG


 


 Ropinirole HCl


  (Requip Tab)  2 mg  TID


 PO  5/25/17 14:00


 6/24/17 13:59  5/28/17 14:14


2 MG


 


 Spironolactone


  (Aldactone Tab)  12.5 mg  QAM


 PO  5/26/17 08:00


 6/25/17 07:59 Future hold 5/28/17 09:26


12.5 MG


 


 Tramadol HCl


  (Ultram Tab)  50 mg  Q6  PRN


 PO  5/25/17 13:45


 6/24/17 13:44   


 


 


 Ferrous Sulfate


  (Feosol Tab)  325 mg  DAILY


 PO  5/26/17 08:00


 6/25/17 07:59  5/28/17 09:28


325 MG


 


 Insulin Aspart


  (novoLOG ASPART)  **SLIDING


 SCALE**


 **If C...  ACHS


 SC  5/25/17 16:30


 6/24/17 16:29  5/28/17 14:07


17 UNITS


 


 Glucose


  (Glucose 40% Gel)  15-30


 GRAMS 15


 GRAMS...  UD  PRN


 PO  5/25/17 13:45


 6/24/17 13:44   


 


 


 Glucose


  (Glucose Chew


 Tab)  4-8


 Tablets 4


 Tabl...  UD  PRN


 PO  5/25/17 13:45


 6/24/17 13:44   


 


 


 Dextrose


  (Dextrose 50%


 50ML Syringe)  25-50ML OF


 50% DW IV


 FOR...  UD  PRN


 IV  5/25/17 13:45


 6/24/17 13:44   


 


 


 Glucagon


  (Glucagon Inj)  1 mg  UD  PRN


 SQ  5/25/17 13:45


 6/24/17 13:44   


 


 


 Miscellaneous


 Information


  (Consult


 Glycemic


 Management


 Pharmacy)  1 ea  UD  PRN


 N/A  5/25/17 14:00


 6/24/17 13:59   


 


 


 Guaifenesin


  (Organidin Nr


 Tab)  200 mg  Q4H  PRN


 PO  5/25/17 15:30


 6/24/17 15:29   


 


 


 Ipratropium


 Bromide


  (Atrovent 0.02%


 0.5MG/2.5ML Neb)  0.5 mg  Q6R


 INH  5/25/17 21:00


 6/24/17 20:59  5/28/17 14:05


0.5 MG


 


 Levalbuterol 1.25


 mg  1.25 mg  Q6R


 INH  5/25/17 21:00


 6/24/17 20:59  5/28/17 14:05


1.25 MG


 


 Methylprednisolone


 Sodium Succinate/


 Syringe


  (Solu-Medrol IV/


 Syringe)  0.64 ml @ 


 1.5 mls/min  Q8


 IV  5/26/17 14:00


 6/25/17 13:59  5/28/17 14:09


1.5 MLS/MIN


 


 Insulin Glargine


  (Lantus Solostar


 Pen)  30 unit  DAILY


 SC  5/27/17 08:00


 6/26/17 07:59  5/28/17 08:49


30 UNIT


 


 Hydralazine HCl


  (Apresoline Tab)  10 mg  TID


 PO  5/27/17 08:00


 6/26/17 07:59  5/28/17 14:14


10 MG


 


 Heparin Sodium


  (Porcine)


  (Heparin Sq 5000


 Unit/0.5ml)  5,000 unit  Q12


 SQ  5/27/17 09:00


 6/26/17 08:59  5/28/17 08:49


5,000 UNIT


 


 Nystatin


  (Mycostatin Susp)  10 ml  QID


 PO  5/27/17 12:00


 5/30/17 11:59  5/28/17 14:15


10 ML


 


 Hydralazine HCl


  (HydrALAZINE INJ)  20 mg  Q6H  PRN


 IV.  5/27/17 14:30


 6/26/17 14:29   


 


 


 Tolterodine


 Tartrate


  (Detrol Tab)  2 mg  BID


 PO  5/28/17 20:00


 6/27/17 19:59   


 


 


 Budesonide/


 Formoterol


 Fumarate


  (Symbicort 160/


 4.5 Inh)  2 puffs  BID


 INH  5/28/17 20:00


 6/27/17 19:59   


 


 


 Roflumilast


  (Daliresp Tab)  500 mcg  DAILY


 PO  5/29/17 08:00


 6/28/17 07:59   


 








I & O:











 24-Hour Column 


 


 5/28/17





 08:00


 


Intake Total 670 ml


 


Balance 670 ml








Vital Signs:











  Date Time  Temp Pulse Resp B/P Pulse Ox O2 Delivery O2 Flow Rate FiO2


 


5/28/17 14:46 36.5 67 20 153/83 91   


 


5/28/17 14:13  65  170/94    


 


5/28/17 14:05  87 20  96 Nasal Cannula 3.0 


 


5/28/17 08:00      Nasal Cannula 3.0 


 


5/28/17 07:43  74 20  93 Nasal Cannula 3.0 


 


5/28/17 07:25 36.5 78 20 125/71 92  3.0 


 


5/28/17 02:02  80 24  96 Nasal Cannula 3.0 


 


5/28/17 00:00     96 Nasal Cannula 3.0 


 


5/27/17 23:48 36.4 63 20 160/90 94 Nasal Cannula 2.0 


 


5/27/17 21:00  63  161/93    


 


5/27/17 19:00  72 18  96 Nasal Cannula 3.0 


 


5/27/17 16:00      Nasal Cannula 3.0 








Laboratory Results:





Last 24 Hours








Test


  5/27/17


16:25 5/27/17


20:13 5/28/17


00:26 5/28/17


04:00


 


Bedside Glucose 126 mg/dl  126 mg/dl  104 mg/dl  113 mg/dl 














Test


  5/28/17


05:33 5/28/17


07:43 5/28/17


11:22 


 


 


White Blood Count 7.15 K/uL    


 


Red Blood Count 3.23 M/uL    


 


Hemoglobin 10.5 g/dL    


 


Hematocrit 32.7 %    


 


Mean Corpuscular Volume 101.2 fL    


 


Mean Corpuscular Hemoglobin 32.5 pg    


 


Mean Corpuscular Hemoglobin


Concent 32.1 g/dl 


  


  


  


 


 


Platelet Count 139 K/uL    


 


Mean Platelet Volume 10.9 fL    


 


Neutrophils (%) (Auto) 93.0 %    


 


Lymphocytes (%) (Auto) 4.1 %    


 


Monocytes (%) (Auto) 2.8 %    


 


Eosinophils (%) (Auto) 0.0 %    


 


Basophils (%) (Auto) 0.0 %    


 


Neutrophils # (Auto) 6.65 K/uL    


 


Lymphocytes # (Auto) 0.29 K/uL    


 


Monocytes # (Auto) 0.20 K/uL    


 


Eosinophils # (Auto) 0.00 K/uL    


 


Basophils # (Auto) 0.00 K/uL    


 


RDW Standard Deviation 48.0 fL    


 


RDW Coefficient of Variation 13.0 %    


 


Immature Granulocyte % (Auto) 0.1 %    


 


Immature Granulocyte # (Auto) 0.01 K/uL    


 


Sodium Level 140 mmol/L    


 


Potassium Level 4.2 mmol/L    


 


Chloride Level 98 mmol/L    


 


Carbon Dioxide Level 35 mmol/L    


 


Anion Gap 7.0 mmol/L    


 


Blood Urea Nitrogen 47 mg/dl    


 


Creatinine 1.40 mg/dl    


 


Est Creatinine Clear Calc


Drug Dose 40.8 ml/min 


  


  


  


 


 


Estimated GFR (


American) 42.8 


  


  


  


 


 


Estimated GFR (Non-


American 36.9 


  


  


  


 


 


BUN/Creatinine Ratio 33.3    


 


Random Glucose 114 mg/dl    


 


Calcium Level 8.2 mg/dl    


 


Magnesium Level 2.6 mg/dl    


 


Bedside Glucose  143 mg/dl  196 mg/dl

## 2017-05-28 NOTE — PROGRESS NOTE
Subjective


Date of Service:


May 28, 2017.


Subjective


Pt evaluation today including:  conversation w/ patient, physical exam, chart 

review, lab review, review of studies, conversation w/ consultant, review of 

inpatient medication list


Sitting up, reading computer, however she reported difficulty breathing not 

really improving, was having dyspnea on exertion and desaturation when in the 

restroom,


Some cough, denied chest pain





Problem List


Medical Problems:


(1) Cellulitis


Status: Acute  





(2) COPD exacerbation


Status: Acute  





(3) COPD exacerbation


Status: Acute  





(4) Hypoxia


Status: Acute  





(5) SOB (shortness of breath)


Status: Acute  








Review of Systems


Constitutional:  No chills, No fatigue, No fever, No problem reported, No sweats

, No weakness, No weight loss


Eyes:  No diplopia, No discharge, No eye pain, No redness, No worsening of 

vision


ENT:  No dental problems, No hearing loss, No nasal symptoms, No sore throat, 

No tinnitus, No trouble swallowing, No unusual epistaxis


Respiratory:  + cough, + dyspnea on exertion, + see HPI, + shortness of breath, 

+ sputum, + wheezing, No dyspnea at rest, No hemoptysis


Cardiac:  No PND, No chest pain, No claudication, No edema, No orthopnea, No 

palpitations


Abdomen:  No constipation, No diarrhea, No nausea, No pain, No vomiting


Musculoskeletal:  No calf pain, No joint pain, No muscle pain, No swelling


Female :  + incontinence, No abnormal vaginal bleeding, No dysuria, No 

hematuria, No urinary frequency, No vaginal discharge


Neurologic:  No balance problems, No memory loss, No numbness/tingling, No 

paralysis, No vertigo, No weakness


Psychiatric:  No anhedonism, No anxiety, No depression symptoms, No insomnia, 

No substance abuse


Heme:  No abnormal bleeding/bruising, No clotting problems, No night sweats, No 

swollen lymph nodes


Endo:  No excessive thirst, No excessive urination, No fatigue


Skin:  No bleeding, No color change, No itch, No new/changing skin lesions, No 

rash





Objective


Vital Signs











  Date Time  Temp Pulse Resp B/P Pulse Ox O2 Delivery O2 Flow Rate FiO2


 


5/28/17 14:46 36.5 67 20 153/83 91   


 


5/28/17 14:13  65  170/94    


 


5/28/17 14:05  87 20  96 Nasal Cannula 3.0 


 


5/28/17 08:00      Nasal Cannula 3.0 


 


5/28/17 07:43  74 20  93 Nasal Cannula 3.0 


 


5/28/17 07:25 36.5 78 20 125/71 92  3.0 


 


5/28/17 02:02  80 24  96 Nasal Cannula 3.0 


 


5/28/17 00:00     96 Nasal Cannula 3.0 


 


5/27/17 23:48 36.4 63 20 160/90 94 Nasal Cannula 2.0 


 


5/27/17 21:00  63  161/93    


 


5/27/17 19:00  72 18  96 Nasal Cannula 3.0 











Physical Exam


General Appearance:  WD/WN, no apparent distress, + obese


Eyes:  normal inspection, PERRL, EOMI, sclerae normal


ENT:  normal ENT inspection, hearing grossly normal, pharynx normal


Neck:  supple, no adenopathy, thyroid normal, no JVD, no carotid bruits, 

trachea midline


Respiratory/Chest:  chest non-tender, normal breath sounds, no respiratory 

distress, no accessory muscle use, + decreased breath sounds


Cardiovascular:  regular rate, rhythm, no edema, no gallop, no JVD, no murmur


Abdomen:  normal bowel sounds, non tender, soft, no organomegaly, no pulsatile 

mass


Extremities:  normal range of motion, non-tender, normal inspection, no pedal 

edema, no calf tenderness, normal capillary refill, pelvis stable


Neurologic/Psychiatric:  CNs II-XII nml as tested, no motor/sensory deficits, 

alert, normal mood/affect, oriented x 3


Skin:  normal color, warm/dry, no rash


Lymphatic:  no adenopathy





Laboratory Results





Last 24 Hours








Test


  5/27/17


20:13 5/28/17


00:26 5/28/17


04:00 5/28/17


05:33


 


Bedside Glucose 126 mg/dl  104 mg/dl  113 mg/dl  


 


White Blood Count    7.15 K/uL 


 


Red Blood Count    3.23 M/uL 


 


Hemoglobin    10.5 g/dL 


 


Hematocrit    32.7 % 


 


Mean Corpuscular Volume    101.2 fL 


 


Mean Corpuscular Hemoglobin    32.5 pg 


 


Mean Corpuscular Hemoglobin


Concent 


  


  


  32.1 g/dl 


 


 


Platelet Count    139 K/uL 


 


Mean Platelet Volume    10.9 fL 


 


Neutrophils (%) (Auto)    93.0 % 


 


Lymphocytes (%) (Auto)    4.1 % 


 


Monocytes (%) (Auto)    2.8 % 


 


Eosinophils (%) (Auto)    0.0 % 


 


Basophils (%) (Auto)    0.0 % 


 


Neutrophils # (Auto)    6.65 K/uL 


 


Lymphocytes # (Auto)    0.29 K/uL 


 


Monocytes # (Auto)    0.20 K/uL 


 


Eosinophils # (Auto)    0.00 K/uL 


 


Basophils # (Auto)    0.00 K/uL 


 


RDW Standard Deviation    48.0 fL 


 


RDW Coefficient of Variation    13.0 % 


 


Immature Granulocyte % (Auto)    0.1 % 


 


Immature Granulocyte # (Auto)    0.01 K/uL 


 


Sodium Level    140 mmol/L 


 


Potassium Level    4.2 mmol/L 


 


Chloride Level    98 mmol/L 


 


Carbon Dioxide Level    35 mmol/L 


 


Anion Gap    7.0 mmol/L 


 


Blood Urea Nitrogen    47 mg/dl 


 


Creatinine    1.40 mg/dl 


 


Est Creatinine Clear Calc


Drug Dose 


  


  


  40.8 ml/min 


 


 


Estimated GFR (


American) 


  


  


  42.8 


 


 


Estimated GFR (Non-


American 


  


  


  36.9 


 


 


BUN/Creatinine Ratio    33.3 


 


Random Glucose    114 mg/dl 


 


Calcium Level    8.2 mg/dl 


 


Magnesium Level    2.6 mg/dl 














Test


  5/28/17


07:43 5/28/17


11:22 


  


 


 


Bedside Glucose 143 mg/dl  196 mg/dl   











Assessment and Plan


73 y/o female with admitted on May 25 2017 because of COPD exacerbation, 





COPD exacerbation: Stable and a little  improving


Chronic respiratory failure with hypoxia and home O2 dependent


advanced emphysema,  continue current care, continue Solu-Medrol, nebulizer 

treatment, I recommend from pulmonologist, we started Symbicort (she takes Qvar 

as outpatient), added Daliresp,


Per pulmonologist start Tudorza when she is closing in her discharge, meanwhile 

on short acting antimuscarinics





possibly mild decompensation of Diastolic CHF: Stable


Possible acute on chronic renal failure stage III, yesterday creatinine is 1.6 

from 1.3 upon admission, although possible in her baseline Lasix and Aldactone 

was hold  yesterday , and restart it today because improving renal function 


and continue hold her lisinopril, will watch renal function closely





Accelerated hypertension, likely because of holding of lisinopril, has started 

hydrolyzing, scheduled and as needed





Possible oral thrush: Okay to start nystatin mouthwash





PMHx of COPD, chronic diastolic CHF, DM II, HTN, and HLD 


has multiple COPD exacerbations within the past year, this being the third in 

hospital stay.  





Urinary incontinence--> is not new per patient, possible urge incontinence


-UA 10 -30 WBCs-->await urine culture


- Urology saw her, start a trial of anticholinergics - tolteridine 2 mg BID 

ordered, and has appointment to follow up plan





Patient is looking into an assisted living,


Disposition: Pt from home, discharge when medically stable


Continued [facility] stay due to:  multiple IV medications needed


Discharge planning:  home

## 2017-05-28 NOTE — PHARMACY PROGRESS NOTE
Glycemic Control:  Progress Nt


Date of Service


May 28, 2017.





Scope


Glycemic Pharmacist consulted for glycemic control and to write orders per [facility], located within [facility] 

inpatient glycemic control protocol.





Objective


Accuchecks BSG (last 24hrs):











Test


  5/27/17


16:25 5/27/17


20:13 5/28/17


00:26 5/28/17


04:00


 


Bedside Glucose


  126 mg/dl


(70-90) 126 mg/dl


(70-90) 104 mg/dl


(70-90) 113 mg/dl


(70-90)














Test


  5/28/17


05:33 5/28/17


07:43 5/28/17


11:22 


 


 


Random Glucose


  114 mg/dl


(70-99) 


  


  


 


 


Bedside Glucose


  


  143 mg/dl


(70-90) 196 mg/dl


(70-90) 


 








Laboratory Data (last 24hrs)





HbA1c:





 








Test


  5/26/17


06:20


 


Hemoglobin A1c


  6.6 %


(4.5-5.6)  H











Recent Pertinent Medications


Outpatient Anti-diabetic Regimen:


* Glipizide 5mg PO BIDM 








The patient is currently receiving:


* Basal insulin:             Lantus 30 units SQ Q24hrs - given in the morning





* Correctional Insulin:   Novolog Correction per scale ACHS


                          Goal Range: Low 110 mg/dL - High 140 mg/dL


                          Correction Factor: 15 mg/dL/unit





* Prandial insulin:         Per carb ratio of 1 unit per 5 grams CHO consumed








* Oral Agents:           On hold for admission 








Risk Factors for Insulin Resistance:


* Steroids


* Diet





Assessment & Plan


ASSESSMENT:


Initial:


* Pt known to pharmacy from previous admissions/glycemic consults


 * Patient was admitted to [facility] in January of this year. She was on the same 

dose of steroids (40 mg IV q8) and required 26 units of Lantus BID plus 11-32 

units of bolus insulin per day.


 * Outpatient glipizide on hold for admission and pt is being treated wit SQ 

basal bolus insulin regimen for steroid induced hyperglycemia


5/28/17:


* Pt continues on high dose RTC steroids --> Solumedrol 40mg IV Q8hrs


* Initiated SQ basal bolus insulin regimen on 5/25 based on weight/high stress. 

Titrating dosing daily based on BSG trends and steroid dosing


* Pt has required 84+ units of insulin over the past 24hrs.


 * 30 units of basal insulin 


 * 54units of prandial/correctional insulin


 * Preferred distribution of basal:prandial insulin for steroid induced 

hyperglycemia is ~30-40% basal:60-70% prandial as steroids have their most 

profound effect on post-prandial hyperglycemia. Also, do not want too much 

additional basal insulin on board in the event that steroids are rapidly 

tapered - this would lead to hypoglycemia. 


* BSGs ranging 126 - 196mg/dl over the past 24hrs, goal BSGs are 100-140mg/dl. 


 * Continue high stress basal insulin dosing


 











PLAN FOR INPATIENT GLYCEMIC CONTROL:  SQ basal bolus insulin regimen based on 

an estimated total daily dose of 70-80 units/day secondary to Solumedrol 40mg 

IV Q8hrs. 


* Hold outpatient oral diabetes medications





* Basal insulin 


 * Lantus 30 units SQ in AM 





* Bolus Insulin


 * NovoLog ACHS 


 * Goal Range:  Low 110 mg/dL - High 140 mg/dL


 * Correction Factor: 15 mg/dL/unit


 * Nutritional / Prandial insulin per carb ratio of 1 unit per 5 grams CHO 

consumed





* Taper insulin regimen with each step down in steroid dosing. 





* Please note that the plan above was derived based on current level of insulin 

resistance and hospital stress. These recommendations are appropriate for 

inpatient admission only. Plan of care upon discharge will need to be 

reassessed to avoid potential outpatient hypo/hyperglycemia. 








Thank you.








Looking ahead to discharge:


* Pt is maintained on glipizide as an outpatient with adequate control per 

recent A1c. Insulin most likely not needed at discharge but pt may benefit from 

once daily NPH if high dose prednisone taper is continued at discharge.

## 2017-05-28 NOTE — UROLOGY CONSULTATION
History


General


Date of Service:


May 28, 2017.


Chief Complaint:  Urinary incontinence


Primary Care Physician:


Marty Tineo D.O.


Pt seen a urologist before?:  No





History of Present Illness


71 yo female admitted due to respiratory difficulties associated with her 

longstanding COPD. Her inpatient and outpatient charts are reviewed. She notes 

she has been on longstanding therapy with Lasix for CHF and respiratory 

difficulties but recently this has started causing urgency and significant urge 

incontinence. She has baseline low volume DAYNE. She notes a history of sporadic 

UTIs, not typically an issue and promptly resolved. It seems her incontinence 

caused some noncompliance with her Lasix therapy. She has not seen a urologist 

previously. At worst, she was using 3-4 Depends a day and cost was prohibitive. 

Urology consultation is requested to assist with her difficulties. She denies 

trouble with constipation, does note baseline difficulties with dry mouth.





HPI - Urinary Incontinence


Duration:  months


Pads per day:  3-4


Type of incontinence:  stress, urgent


Urodynamics done?:  No





Laboratory





Last 24 Hours








Test


  5/27/17


16:25 5/27/17


20:13 5/28/17


00:26 5/28/17


04:00


 


Bedside Glucose 126 mg/dl  126 mg/dl  104 mg/dl  113 mg/dl 














Test


  5/28/17


05:33 5/28/17


07:43 5/28/17


11:22 


 


 


White Blood Count 7.15 K/uL    


 


Red Blood Count 3.23 M/uL    


 


Hemoglobin 10.5 g/dL    


 


Hematocrit 32.7 %    


 


Mean Corpuscular Volume 101.2 fL    


 


Mean Corpuscular Hemoglobin 32.5 pg    


 


Mean Corpuscular Hemoglobin


Concent 32.1 g/dl 


  


  


  


 


 


Platelet Count 139 K/uL    


 


Mean Platelet Volume 10.9 fL    


 


Neutrophils (%) (Auto) 93.0 %    


 


Lymphocytes (%) (Auto) 4.1 %    


 


Monocytes (%) (Auto) 2.8 %    


 


Eosinophils (%) (Auto) 0.0 %    


 


Basophils (%) (Auto) 0.0 %    


 


Neutrophils # (Auto) 6.65 K/uL    


 


Lymphocytes # (Auto) 0.29 K/uL    


 


Monocytes # (Auto) 0.20 K/uL    


 


Eosinophils # (Auto) 0.00 K/uL    


 


Basophils # (Auto) 0.00 K/uL    


 


RDW Standard Deviation 48.0 fL    


 


RDW Coefficient of Variation 13.0 %    


 


Immature Granulocyte % (Auto) 0.1 %    


 


Immature Granulocyte # (Auto) 0.01 K/uL    


 


Sodium Level 140 mmol/L    


 


Potassium Level 4.2 mmol/L    


 


Chloride Level 98 mmol/L    


 


Carbon Dioxide Level 35 mmol/L    


 


Anion Gap 7.0 mmol/L    


 


Blood Urea Nitrogen 47 mg/dl    


 


Creatinine 1.40 mg/dl    


 


Est Creatinine Clear Calc


Drug Dose 40.8 ml/min 


  


  


  


 


 


Estimated GFR (


American) 42.8 


  


  


  


 


 


Estimated GFR (Non-


American 36.9 


  


  


  


 


 


BUN/Creatinine Ratio 33.3    


 


Random Glucose 114 mg/dl    


 


Calcium Level 8.2 mg/dl    


 


Magnesium Level 2.6 mg/dl    


 


Bedside Glucose  143 mg/dl  196 mg/dl  











Problem List


Medical Problems:


(1) Cellulitis


Status: Acute  





(2) COPD exacerbation


Status: Acute  





(3) COPD exacerbation


Status: Acute  





(4) Hypoxia


Status: Acute  





(5) SOB (shortness of breath)


Status: Acute  











Past History


congestive heart failure, COPD, depression, diabetes, GERD, high cholesterol, 

hypertension, renal disease, other (obesity, pulmonary HTN)


Past Surgical History:  cholecystectomy, hysterectomy, orthopedic surgery, 

spinal surgery, TKR, tonsillectomy





Family History





Cancer


Diabetes mellitus


FH: heart disease


BRCA daughter, HTN parents





Social History


Hx Tobacco Use In Past Year?:  Yes


Smoking:  non-smoker, quit greater than 1 year


Alcohol:  socially


Drug use:  none


Marital status:  single, 


Housing status:  lives alone


Occupation status:  retired





Immunizations


History of Influenza Vaccine:  Yes


Influenza Vaccine Date:  Oct 17, 2013


History of Tetanus Vaccine?:  Yes


Tetanus Immunization Date:  Oct 8, 2013


History of Pneumococcal:  Yes


Pneumococcal Date:  Nov 17, 2010


History of Hepatitis B Vaccine:  No





History of MDRO


No





Allergies


Coded Allergies:  


     Atropine (Verified  Allergy, Severe, RASH, 1/14/17)


     Dobutamine (Verified  Allergy, Severe, RASH, 1/14/17)





Medications


Home Medications:





Home Meds and Scripts








 Medications  Dose


 Route/Sig


 Max Daily Dose Days Date Category Dose


Instructions


 


 Prednisone 10 Mg


 Tab  10 Mg


 PO UD


   48 1/20/17 Rx  Take 6 tabs (60 mg) daily for 4 days. Then decrease dose by 5 

mg


 daily for 4 days. Decrease dose by 5 mg every 4 days.


 


 Nystatin 5 Ml Susp  5 Ml


 PO QID


   11 1/20/17 Rx  Take 5 ml (1 teaspoon) by mouth four times a day.


 


 Duoneb


  (Ipratropium-Albuterol)


 3 Ml Nebu  1 Treatment


 INH Q6HWA


   30 1/20/17 Rx  Use 1 neb treatment every 6 hours while awake.  May use


 every 2 hours as needed for shortness of breath & wheezing.


 


 Aldactone


  (Spironolactone)


 25 Mg Tab  12.5 Mg


 PO QAM


    1/14/17 Reported 


 


 Amoxil


  (Amoxicillin) 500


 Mg Cap  500 Mg


 PO UD PRN


    1/14/17 Reported 


 


 Lasix


  (Furosemide) 20


 Mg Tab  20 Mg


 PO DAILY


    1/14/17 Reported 


 


 Tramadol HCl 50


 Mg Tab  50 Mg


 PO Q6 PRN


    1/14/17 Reported 


 


 Glipizide 5 Mg Tab  5 Mg


 PO BID


    1/14/17 Reported 


 


 Pravastatin


 Sodium 10 Mg Tab  10 Mg


 PO DAILY


    5/11/14 Reported 


 


 Vitamin B12


 500MCG


  (Cyanocobalamin)


 500 Mcg Tab  1,000 Mcg


 PO DAILY


    5/11/14 Reported 


 


 Prinivil


  (Lisinopril) 10


 Mg Tab  10 Mg


 PO DAILY


    5/11/14 Reported 


 


 Iron Supplement


  (Ferrous Sulfate)


 325 Mg Tab  325 Mg


 PO DAILY


    4/10/13 Reported 


 


 Requip


  (Ropinirole HCl)


 2 Mg Tab  2 Mg


 PO TID


    4/10/13 Reported 


 


 Toprol-Xl


  (Metoprolol


 Succinate) 100 Mg


 Tabcr  100 Mg


 PO DAILY


    4/10/13 Reported 


 


 Prozac


  (Fluoxetine Hcl)


 20 Mg Cap  20 Mg


 PO QAM


    7/13/12 Reported 








Inpatient Medications:





 Current Inpatient Medications








 Medications


  (Trade)  Dose


 Ordered  Sig/Roya


 Route  Start Time


 Stop Time Status Last Admin


Dose Admin


 


 Acetaminophen


  (Tylenol Tab)  650 mg  Q4H  PRN


 PO  5/25/17 13:45


 6/24/17 13:44  5/25/17 23:53


650 MG


 


 Polyethylene


  (Miralax Powder


 Packet)  17 gm  DAILY  PRN


 PO  5/25/17 14:00


 6/24/17 13:59   


 


 


 Ondansetron HCl


  (Zofran Inj)  4 mg  Q6H  PRN


 IV  5/25/17 13:45


 6/24/17 13:44   


 


 


 Cyanocobalamin


  (Vitamin B-12


 Tab)  1,000 mcg  DAILY


 PO  5/26/17 08:00


 6/25/17 07:59  5/28/17 09:31


1,000 MCG


 


 Fluoxetine HCl


  (Prozac Cap)  20 mg  QAM


 PO  5/26/17 08:00


 6/25/17 07:59  5/28/17 09:29


20 MG


 


 Furosemide


  (Lasix Tab)  20 mg  DAILY


 PO  5/26/17 08:00


 6/25/17 07:59 Future hold 5/28/17 09:29


20 MG


 


 Lisinopril


  (Zestril Tab)  10 mg  DAILY


 PO  5/26/17 08:00


 6/25/17 07:59 Future Hold  


 


 


 Metoprolol


 Succinate


  (Toprol Xl Tab)  100 mg  DAILY


 PO  5/26/17 08:00


 6/25/17 07:59  5/28/17 09:30


100 MG


 


 Pravastatin Sodium


  (Pravachol Tab)  10 mg  DAILY


 PO  5/26/17 08:00


 6/25/17 07:59  5/28/17 09:29


10 MG


 


 Ropinirole HCl


  (Requip Tab)  2 mg  TID


 PO  5/25/17 14:00


 6/24/17 13:59  5/28/17 09:30


2 MG


 


 Spironolactone


  (Aldactone Tab)  12.5 mg  QAM


 PO  5/26/17 08:00


 6/25/17 07:59 Future hold 5/28/17 09:26


12.5 MG


 


 Tramadol HCl


  (Ultram Tab)  50 mg  Q6  PRN


 PO  5/25/17 13:45


 6/24/17 13:44   


 


 


 Ferrous Sulfate


  (Feosol Tab)  325 mg  DAILY


 PO  5/26/17 08:00


 6/25/17 07:59  5/28/17 09:28


325 MG


 


 Insulin Aspart


  (novoLOG ASPART)  **SLIDING


 SCALE**


 **If C...  ACHS


 SC  5/25/17 16:30


 6/24/17 16:29  5/28/17 08:48


10 UNITS


 


 Glucose


  (Glucose 40% Gel)  15-30


 GRAMS 15


 GRAMS...  UD  PRN


 PO  5/25/17 13:45


 6/24/17 13:44   


 


 


 Glucose


  (Glucose Chew


 Tab)  4-8


 Tablets 4


 Tabl...  UD  PRN


 PO  5/25/17 13:45


 6/24/17 13:44   


 


 


 Dextrose


  (Dextrose 50%


 50ML Syringe)  25-50ML OF


 50% DW IV


 FOR...  UD  PRN


 IV  5/25/17 13:45


 6/24/17 13:44   


 


 


 Glucagon


  (Glucagon Inj)  1 mg  UD  PRN


 SQ  5/25/17 13:45


 6/24/17 13:44   


 


 


 Miscellaneous


 Information


  (Consult


 Glycemic


 Management


 Pharmacy)  1 ea  UD  PRN


 N/A  5/25/17 14:00


 6/24/17 13:59   


 


 


 Guaifenesin


  (Organidin Nr


 Tab)  200 mg  Q4H  PRN


 PO  5/25/17 15:30


 6/24/17 15:29   


 


 


 Ipratropium


 Bromide


  (Atrovent 0.02%


 0.5MG/2.5ML Neb)  0.5 mg  Q6R


 INH  5/25/17 21:00


 6/24/17 20:59  5/28/17 07:42


0.5 MG


 


 Levalbuterol 1.25


 mg  1.25 mg  Q6R


 INH  5/25/17 21:00


 6/24/17 20:59  5/28/17 07:42


1.25 MG


 


 Methylprednisolone


 Sodium Succinate/


 Syringe


  (Solu-Medrol IV/


 Syringe)  0.64 ml @ 


 1.5 mls/min  Q8


 IV  5/26/17 14:00


 6/25/17 13:59  5/28/17 05:46


1.5 MLS/MIN


 


 Insulin Glargine


  (Lantus Solostar


 Pen)  30 unit  DAILY


 SC  5/27/17 08:00


 6/26/17 07:59  5/28/17 08:49


30 UNIT


 


 Hydralazine HCl


  (Apresoline Tab)  10 mg  TID


 PO  5/27/17 08:00


 6/26/17 07:59  5/28/17 09:28


10 MG


 


 Heparin Sodium


  (Porcine)


  (Heparin Sq 5000


 Unit/0.5ml)  5,000 unit  Q12


 SQ  5/27/17 09:00


 6/26/17 08:59  5/28/17 08:49


5,000 UNIT


 


 Nystatin


  (Mycostatin Susp)  10 ml  QID


 PO  5/27/17 12:00


 5/30/17 11:59  5/28/17 09:32


10 ML


 


 Hydralazine HCl


  (HydrALAZINE INJ)  20 mg  Q6H  PRN


 IV.  5/27/17 14:30


 6/26/17 14:29   


 











Review of Systems


Review of Systems


Constitutional:  No chills, No fever


Eyes:  No double vision, No eye pain


Endocrine:  No tired/sluggish


Gastrointestinal:  No nausea, No vomiting


Cardiovascular:  + swelling ankles/feet


Respiratory:  + shortness of breath (chronic)


Skin:  No boils


Musculoskeletal:  + arthritis


Ears / Nose / Throat:  No hoarse voice, No sinus


Psychologic / Mental:  No trouble remembering


Female :  + frequent urination, + leaking urine, + see HPI





Physical Exam


Vital Signs:





 Vital Signs Past 12 Hours








  Date Time  Temp Pulse Resp B/P Pulse Ox O2 Delivery O2 Flow Rate FiO2


 


5/28/17 08:00      Nasal Cannula 3.0 


 


5/28/17 07:43  74 20  93 Nasal Cannula 3.0 


 


5/28/17 07:25 36.5 78 20 125/71 92  3.0 


 


5/28/17 02:02  80 24  96 Nasal Cannula 3.0 








Physical Exam:


General Appearance:  + mild distress, + obese


ENT:  hearing grossly normal


Neck:  supple, no adenopathy


Respiratory/Chest:  + respiratory distress (moderate SOB)


Cardiovascular:  + pertinent finding (+ LE edema)


Gastrointestinal:  


   Abdomen:  normal abdomen


   Bladder:  normal bladder


   Renal:  normal renal


   Liver:  normal liver


   Spleen:  normal spleen


Neurologic/Psychiatric:  alert


Skin:  normal color





Assessment & Plan


Assessment & Plan


A/P 71 yo female with urge incontinence





Her voiding difficulties seem to be impacting her compliance. Recent UC&S shows 

contamination, no UTI. I think it would be reasonable to start a trial of 

anticholinergics - tolteridine 2 mg BID ordered. She is warned against possible 

worsening of her dry mouth. Side effect profile reviewed.





Will plan on outpatient f/u in the next few weeks to f/u effect of medication. 

Contact information provided.





Thank you for allowing us to participate in this patient's care. Please recall 

our service PRN any new  issues or concerns.

## 2017-05-29 VITALS
DIASTOLIC BLOOD PRESSURE: 83 MMHG | TEMPERATURE: 98.24 F | OXYGEN SATURATION: 94 % | SYSTOLIC BLOOD PRESSURE: 150 MMHG | HEART RATE: 63 BPM

## 2017-05-29 VITALS
TEMPERATURE: 97.52 F | DIASTOLIC BLOOD PRESSURE: 84 MMHG | SYSTOLIC BLOOD PRESSURE: 160 MMHG | OXYGEN SATURATION: 99 % | HEART RATE: 60 BPM

## 2017-05-29 VITALS — HEART RATE: 88 BPM | OXYGEN SATURATION: 93 %

## 2017-05-29 VITALS
TEMPERATURE: 97.52 F | DIASTOLIC BLOOD PRESSURE: 98 MMHG | OXYGEN SATURATION: 91 % | SYSTOLIC BLOOD PRESSURE: 173 MMHG | HEART RATE: 71 BPM

## 2017-05-29 VITALS — OXYGEN SATURATION: 93 % | HEART RATE: 84 BPM

## 2017-05-29 VITALS — HEART RATE: 67 BPM | DIASTOLIC BLOOD PRESSURE: 84 MMHG | SYSTOLIC BLOOD PRESSURE: 153 MMHG

## 2017-05-29 VITALS — OXYGEN SATURATION: 96 % | HEART RATE: 86 BPM

## 2017-05-29 RX ADMIN — HYDRALAZINE HYDROCHLORIDE SCH MG: 10 TABLET ORAL at 21:44

## 2017-05-29 RX ADMIN — LEVALBUTEROL SCH MG: 1.25 SOLUTION, CONCENTRATE RESPIRATORY (INHALATION) at 01:52

## 2017-05-29 RX ADMIN — NYSTATIN SCH ML: 100000 SUSPENSION ORAL at 17:48

## 2017-05-29 RX ADMIN — BUDESONIDE AND FORMOTEROL FUMARATE DIHYDRATE SCH PUFFS: 160; 4.5 AEROSOL RESPIRATORY (INHALATION) at 07:47

## 2017-05-29 RX ADMIN — ROPINIROLE HYDROCHLORIDE SCH MG: 1 TABLET, FILM COATED ORAL at 21:45

## 2017-05-29 RX ADMIN — FLUOXETINE SCH MG: 20 CAPSULE ORAL at 07:56

## 2017-05-29 RX ADMIN — FERROUS SULFATE TAB EC 325 MG (65 MG FE EQUIVALENT) SCH MG: 325 (65 FE) TABLET DELAYED RESPONSE at 07:50

## 2017-05-29 RX ADMIN — ROFLUMILAST SCH MCG: 500 TABLET ORAL at 07:50

## 2017-05-29 RX ADMIN — LEVALBUTEROL SCH MG: 1.25 SOLUTION, CONCENTRATE RESPIRATORY (INHALATION) at 14:32

## 2017-05-29 RX ADMIN — ROPINIROLE HYDROCHLORIDE SCH MG: 1 TABLET, FILM COATED ORAL at 14:16

## 2017-05-29 RX ADMIN — TOLTERODINE TARTRATE SCH MG: 2 TABLET, FILM COATED ORAL at 21:44

## 2017-05-29 RX ADMIN — HYDRALAZINE HYDROCHLORIDE SCH MG: 10 TABLET ORAL at 14:16

## 2017-05-29 RX ADMIN — LEVALBUTEROL SCH MG: 1.25 SOLUTION, CONCENTRATE RESPIRATORY (INHALATION) at 07:12

## 2017-05-29 RX ADMIN — HEPARIN SODIUM SCH UNIT: 10000 INJECTION, SOLUTION INTRAVENOUS; SUBCUTANEOUS at 08:39

## 2017-05-29 RX ADMIN — PRAVASTATIN SODIUM SCH MG: 10 TABLET ORAL at 07:56

## 2017-05-29 RX ADMIN — INSULIN ASPART SCH UNITS: 100 INJECTION, SOLUTION INTRAVENOUS; SUBCUTANEOUS at 18:15

## 2017-05-29 RX ADMIN — HEPARIN SODIUM SCH UNIT: 10000 INJECTION, SOLUTION INTRAVENOUS; SUBCUTANEOUS at 21:40

## 2017-05-29 RX ADMIN — LEVALBUTEROL SCH MG: 1.25 SOLUTION, CONCENTRATE RESPIRATORY (INHALATION) at 18:45

## 2017-05-29 RX ADMIN — INSULIN ASPART SCH UNITS: 100 INJECTION, SOLUTION INTRAVENOUS; SUBCUTANEOUS at 21:00

## 2017-05-29 RX ADMIN — ROPINIROLE HYDROCHLORIDE SCH MG: 1 TABLET, FILM COATED ORAL at 07:57

## 2017-05-29 RX ADMIN — Medication SCH MCG: at 07:58

## 2017-05-29 RX ADMIN — BUDESONIDE AND FORMOTEROL FUMARATE DIHYDRATE SCH PUFFS: 160; 4.5 AEROSOL RESPIRATORY (INHALATION) at 21:41

## 2017-05-29 RX ADMIN — NYSTATIN SCH ML: 100000 SUSPENSION ORAL at 21:43

## 2017-05-29 RX ADMIN — FUROSEMIDE SCH MG: 20 TABLET ORAL at 07:55

## 2017-05-29 RX ADMIN — METOPROLOL SUCCINATE SCH MG: 50 TABLET, EXTENDED RELEASE ORAL at 07:57

## 2017-05-29 RX ADMIN — INSULIN ASPART SCH UNITS: 100 INJECTION, SOLUTION INTRAVENOUS; SUBCUTANEOUS at 12:46

## 2017-05-29 RX ADMIN — INSULIN GLARGINE SCH UNIT: 100 INJECTION, SOLUTION SUBCUTANEOUS at 08:40

## 2017-05-29 RX ADMIN — METHYLPREDNISOLONE SODIUM SUCCINATE SCH MLS/MIN: 1 INJECTION, POWDER, FOR SOLUTION INTRAMUSCULAR; INTRAVENOUS at 06:08

## 2017-05-29 RX ADMIN — INSULIN ASPART SCH UNITS: 100 INJECTION, SOLUTION INTRAVENOUS; SUBCUTANEOUS at 08:38

## 2017-05-29 RX ADMIN — IPRATROPIUM BROMIDE SCH MG: 0.5 SOLUTION RESPIRATORY (INHALATION) at 07:11

## 2017-05-29 RX ADMIN — METHYLPREDNISOLONE SODIUM SUCCINATE SCH MLS/MIN: 1 INJECTION, POWDER, FOR SOLUTION INTRAMUSCULAR; INTRAVENOUS at 14:13

## 2017-05-29 RX ADMIN — TOLTERODINE TARTRATE SCH MG: 2 TABLET, FILM COATED ORAL at 07:50

## 2017-05-29 RX ADMIN — SPIRONOLACTONE SCH MG: 25 TABLET, FILM COATED ORAL at 07:48

## 2017-05-29 RX ADMIN — IPRATROPIUM BROMIDE SCH MG: 0.5 SOLUTION RESPIRATORY (INHALATION) at 18:45

## 2017-05-29 RX ADMIN — HYDRALAZINE HYDROCHLORIDE SCH MG: 10 TABLET ORAL at 07:49

## 2017-05-29 RX ADMIN — METHYLPREDNISOLONE SODIUM SUCCINATE SCH MLS/MIN: 1 INJECTION, POWDER, FOR SOLUTION INTRAMUSCULAR; INTRAVENOUS at 21:42

## 2017-05-29 RX ADMIN — IPRATROPIUM BROMIDE SCH MG: 0.5 SOLUTION RESPIRATORY (INHALATION) at 14:32

## 2017-05-29 RX ADMIN — NYSTATIN SCH ML: 100000 SUSPENSION ORAL at 12:47

## 2017-05-29 RX ADMIN — NYSTATIN SCH ML: 100000 SUSPENSION ORAL at 08:46

## 2017-05-29 RX ADMIN — IPRATROPIUM BROMIDE SCH MG: 0.5 SOLUTION RESPIRATORY (INHALATION) at 01:52

## 2017-05-29 NOTE — PULMONOLOGY PROGRESS NOTE
Pulmonary Progress Note


Date of Service


May 29, 2017.





Attending








Subjective


Does not feel much improved


feels good at rest and the starts to get very short of breath on exertion





Objective


General Appearance:  no distress, obese


Eyes:  PERRLA


ENT:  NORMAL THROAT EXAM


Neck:  NO TENDERNESS, TRACHEA MIDLINE


Respiratory:  faint wheezing, very diminished breath sounds


Cardiovasular:  REGULAR RATE/RHYTHM, NORMAL S1S2


Abdomen:  NON TENDER, NO REBOUND, NO GUARDING


Upper Extremities:  NO EDEMA


Lower Extremities:  edema (bilateral)


Neuro:  ALERT, ORIENTED x 3





Assessment & Plan


72 year old female with advanced emphysema, presents with an exacerbation 

episode.





Plan: Continue steroids, bronchodilators around the clock


No need for Abx, WBC normal, afebrile. The CXR likely shows basilar atelectasis.


Supplement O2.


might also benefit from home trilogy upon discharge and w/u for [facility] 

outpatient


continue symbicort and daliresp


will slowly taper steroids


add spiriva or tudorza at the time discharge


pt/ot


will also need pulmonary rehab as outpatient


Patient lives alone and does not want to go to any assisted facility





Data


Medications:





 Current Inpatient Medications








 Medications


  (Trade)  Dose


 Ordered  Sig/Roya


 Route  Start Time


 Stop Time Status Last Admin


Dose Admin


 


 Acetaminophen


  (Tylenol Tab)  650 mg  Q4H  PRN


 PO  5/25/17 13:45


 6/24/17 13:44  5/25/17 23:53


650 MG


 


 Polyethylene


  (Miralax Powder


 Packet)  17 gm  DAILY  PRN


 PO  5/25/17 14:00


 6/24/17 13:59   


 


 


 Ondansetron HCl


  (Zofran Inj)  4 mg  Q6H  PRN


 IV  5/25/17 13:45


 6/24/17 13:44   


 


 


 Cyanocobalamin


  (Vitamin B-12


 Tab)  1,000 mcg  DAILY


 PO  5/26/17 08:00


 6/25/17 07:59  5/29/17 07:58


1,000 MCG


 


 Fluoxetine HCl


  (Prozac Cap)  20 mg  QAM


 PO  5/26/17 08:00


 6/25/17 07:59  5/29/17 07:56


20 MG


 


 Furosemide


  (Lasix Tab)  20 mg  DAILY


 PO  5/26/17 08:00


 6/25/17 07:59 Future hold 5/29/17 07:55


20 MG


 


 Lisinopril


  (Zestril Tab)  10 mg  DAILY


 PO  5/26/17 08:00


 6/25/17 07:59 Future hold  


 


 


 Metoprolol


 Succinate


  (Toprol Xl Tab)  100 mg  DAILY


 PO  5/26/17 08:00


 6/25/17 07:59  5/29/17 07:57


100 MG


 


 Pravastatin Sodium


  (Pravachol Tab)  10 mg  DAILY


 PO  5/26/17 08:00


 6/25/17 07:59  5/29/17 07:56


10 MG


 


 Ropinirole HCl


  (Requip Tab)  2 mg  TID


 PO  5/25/17 14:00


 6/24/17 13:59  5/29/17 07:57


2 MG


 


 Spironolactone


  (Aldactone Tab)  12.5 mg  QAM


 PO  5/26/17 08:00


 6/25/17 07:59 Future hold 5/29/17 07:48


12.5 MG


 


 Tramadol HCl


  (Ultram Tab)  50 mg  Q6  PRN


 PO  5/25/17 13:45


 6/24/17 13:44   


 


 


 Ferrous Sulfate


  (Feosol Tab)  325 mg  DAILY


 PO  5/26/17 08:00


 6/25/17 07:59  5/29/17 07:50


325 MG


 


 Insulin Aspart


  (novoLOG ASPART)  **SLIDING


 SCALE**


 **If C...  ACHS


 SC  5/25/17 16:30


 6/24/17 16:29  5/29/17 08:38


13 UNITS


 


 Glucose


  (Glucose 40% Gel)  15-30


 GRAMS 15


 GRAMS...  UD  PRN


 PO  5/25/17 13:45


 6/24/17 13:44   


 


 


 Glucose


  (Glucose Chew


 Tab)  4-8


 Tablets 4


 Tabl...  UD  PRN


 PO  5/25/17 13:45


 6/24/17 13:44   


 


 


 Dextrose


  (Dextrose 50%


 50ML Syringe)  25-50ML OF


 50% DW IV


 FOR...  UD  PRN


 IV  5/25/17 13:45


 6/24/17 13:44   


 


 


 Glucagon


  (Glucagon Inj)  1 mg  UD  PRN


 SQ  5/25/17 13:45


 6/24/17 13:44   


 


 


 Miscellaneous


 Information


  (Consult


 Glycemic


 Management


 Pharmacy)  1 ea  UD  PRN


 N/A  5/25/17 14:00


 6/24/17 13:59   


 


 


 Guaifenesin


  (Organidin Nr


 Tab)  200 mg  Q4H  PRN


 PO  5/25/17 15:30


 6/24/17 15:29   


 


 


 Ipratropium


 Bromide


  (Atrovent 0.02%


 0.5MG/2.5ML Neb)  0.5 mg  Q6R


 INH  5/25/17 21:00


 6/24/17 20:59  5/29/17 07:11


0.5 MG


 


 Levalbuterol 1.25


 mg  1.25 mg  Q6R


 INH  5/25/17 21:00


 6/24/17 20:59  5/29/17 07:12


1.25 MG


 


 Methylprednisolone


 Sodium Succinate/


 Syringe


  (Solu-Medrol IV/


 Syringe)  0.64 ml @ 


 1.5 mls/min  Q8


 IV  5/26/17 14:00


 6/25/17 13:59  5/29/17 06:08


1.5 MLS/MIN


 


 Insulin Glargine


  (Lantus Solostar


 Pen)  30 unit  DAILY


 SC  5/27/17 08:00


 6/26/17 07:59  5/29/17 08:40


30 UNIT


 


 Hydralazine HCl


  (Apresoline Tab)  10 mg  TID


 PO  5/27/17 08:00


 6/26/17 07:59  5/29/17 07:49


10 MG


 


 Heparin Sodium


  (Porcine)


  (Heparin Sq 5000


 Unit/0.5ml)  5,000 unit  Q12


 SQ  5/27/17 09:00


 6/26/17 08:59  5/29/17 08:39


5,000 UNIT


 


 Nystatin


  (Mycostatin Susp)  10 ml  QID


 PO  5/27/17 12:00


 5/30/17 11:59  5/29/17 08:46


10 ML


 


 Hydralazine HCl


  (HydrALAZINE INJ)  20 mg  Q6H  PRN


 IV.  5/27/17 14:30


 6/26/17 14:29   


 


 


 Tolterodine


 Tartrate


  (Detrol Tab)  2 mg  BID


 PO  5/28/17 20:00


 6/27/17 19:59  5/29/17 07:50


2 MG


 


 Budesonide/


 Formoterol


 Fumarate


  (Symbicort 160/


 4.5 Inh)  2 puffs  BID


 INH  5/28/17 20:00


 6/27/17 19:59  5/29/17 07:47


2 PUFFS


 


 Roflumilast


  (Daliresp Tab)  500 mcg  DAILY


 PO  5/29/17 08:00


 6/28/17 07:59  5/29/17 07:50


500 MCG








I & O:











 24-Hour Column 


 


 5/29/17





 08:00


 


Intake Total 410 ml


 


Output Total 2200 ml


 


Balance -1790 ml








Vital Signs:











  Date Time  Temp Pulse Resp B/P Pulse Ox O2 Delivery O2 Flow Rate FiO2


 


5/29/17 08:00      Nasal Cannula 3.0 


 


5/29/17 07:36 36.4 60 18 160/84 99 Nasal Cannula 3.0 


 


5/29/17 07:12  84 18  93 Nasal Cannula 3.0 


 


5/29/17 01:52  86 18  96 Nasal Cannula 3.0 


 


5/29/17 00:31 36.4 71 20 173/98 91 Room Air  


 


5/28/17 23:59      Nasal Cannula 3.0 


 


5/28/17 18:55  90 18  95 Nasal Cannula 3.0 


 


5/28/17 16:00      Nasal Cannula 3.0 


 


5/28/17 14:46 36.5 67 20 153/83 91   


 


5/28/17 14:13  65  170/94    


 


5/28/17 14:05  87 20  96 Nasal Cannula 3.0 








Laboratory Results:





Last 24 Hours








Test


  5/28/17


16:34 5/28/17


20:28 5/29/17


07:38


 


Bedside Glucose 202 mg/dl  144 mg/dl  111 mg/dl

## 2017-05-29 NOTE — PROGRESS NOTE
Subjective


Date of Service:


May 29, 2017.


Subjective


pt is very tremulous and still gets markedly short of breath with minor exertion

, no cough or sputum which is a marked difference from home( usually with 

rhinorhea and cough), she has no other complaints or problems at this time, is 

considering snf for subacute rehab or assisted living on discharge





Problem List


Medical Problems:


(1) Cellulitis


Status: Acute  





(2) COPD exacerbation


Status: Acute  





(3) COPD exacerbation


Status: Acute  





(4) Hypoxia


Status: Acute  





(5) SOB (shortness of breath)


Status: Acute  











Review of Systems


Constitutional:  + fatigue, + weakness, No chills, No fever


ENT:  No hearing loss, No sore throat, No unusual epistaxis


Respiratory:  + dyspnea on exertion, + shortness of breath, No cough, No sputum


Cardiac:  No chest pain, No edema, No orthopnea


Abdomen:  No nausea, No pain, No vomiting


Musculoskeletal:  + joint pain, + swelling, No muscle pain


Female :  No dysuria, No urinary frequency





Objective


Vital Signs











  Date Time  Temp Pulse Resp B/P Pulse Ox O2 Delivery O2 Flow Rate FiO2


 


5/29/17 17:24 36.8 63 18 150/83 94  3.0 


 


5/29/17 14:12  67  153/84    


 


5/29/17 08:00      Nasal Cannula 3.0 


 


5/29/17 07:36 36.4 60 18 160/84 99 Nasal Cannula 3.0 


 


5/29/17 07:12  84 18  93 Nasal Cannula 3.0 


 


5/29/17 01:52  86 18  96 Nasal Cannula 3.0 


 


5/29/17 00:31 36.4 71 20 173/98 91 Room Air  


 


5/28/17 23:59      Nasal Cannula 3.0 


 


5/28/17 18:55  90 18  95 Nasal Cannula 3.0 











Physical Exam


General Appearance:  WD/WN, + moderate distress, + obese


Eyes:  PERRL, EOMI


ENT:  hearing grossly normal, pharynx normal


Neck:  supple, thyroid normal


Respiratory/Chest:  + decreased breath sounds, + accessory muscle use, + rales


Cardiovascular:  regular rate, rhythm, no murmur


Abdomen:  normal bowel sounds, non tender, soft


Extremities:  no calf tenderness, + pedal edema (trace)


Neurologic/Psychiatric:  alert, oriented x 3





Laboratory Results





Last 24 Hours








Test


  5/28/17


20:28 5/29/17


07:38 5/29/17


12:06 5/29/17


16:53


 


Bedside Glucose 144 mg/dl  111 mg/dl  161 mg/dl  93 mg/dl 











Assessment and Plan


73 y/o female with admitted on May 25 2017 because of COPD exacerbation, 





COPD exacerbation:


Chronic respiratory failure with hypoxia and home O2 dependent


 Solu-Medrol, nebulizer treatment,  Symbicort (she takes Qvar as outpatient), 

added Daliresp,


Per pulmonologist start Tudorza when she is closing in her discharge(or spiriva)

, consider trilogy at discharge





chronic diastolic heart failure, stable





acute on chronic renal failure stage III, yesterday creatinine is 1.6 from 1.3 

upon admission,  Lasix and Aldactone restarted after initial hold


and continue hold her lisinopril





Possible oral thrush: nystatin mouthwash improved 





Urinary incontinence--> is not new per patient, possible urge incontinence


- Urology saw her, start a trial of anticholinergics - tolteridine 2 mg BID 

ordered, and has appointment to follow up plan, urine culture pending


Continued [facility] stay due to:  multiple IV medications needed


Discharge planning:  home

## 2017-05-30 VITALS — OXYGEN SATURATION: 78 %

## 2017-05-30 VITALS — HEART RATE: 62 BPM | OXYGEN SATURATION: 97 %

## 2017-05-30 VITALS — HEART RATE: 71 BPM | OXYGEN SATURATION: 96 %

## 2017-05-30 VITALS — DIASTOLIC BLOOD PRESSURE: 75 MMHG | HEART RATE: 70 BPM | SYSTOLIC BLOOD PRESSURE: 153 MMHG

## 2017-05-30 VITALS — HEART RATE: 64 BPM | SYSTOLIC BLOOD PRESSURE: 151 MMHG | OXYGEN SATURATION: 94 % | DIASTOLIC BLOOD PRESSURE: 71 MMHG

## 2017-05-30 VITALS
SYSTOLIC BLOOD PRESSURE: 161 MMHG | TEMPERATURE: 97.88 F | OXYGEN SATURATION: 95 % | HEART RATE: 60 BPM | DIASTOLIC BLOOD PRESSURE: 84 MMHG

## 2017-05-30 VITALS — OXYGEN SATURATION: 95 % | HEART RATE: 85 BPM

## 2017-05-30 VITALS
SYSTOLIC BLOOD PRESSURE: 163 MMHG | OXYGEN SATURATION: 95 % | DIASTOLIC BLOOD PRESSURE: 94 MMHG | HEART RATE: 69 BPM | TEMPERATURE: 97.52 F

## 2017-05-30 VITALS — OXYGEN SATURATION: 95 %

## 2017-05-30 RX ADMIN — HEPARIN SODIUM SCH UNIT: 10000 INJECTION, SOLUTION INTRAVENOUS; SUBCUTANEOUS at 08:53

## 2017-05-30 RX ADMIN — NYSTATIN SCH ML: 100000 SUSPENSION ORAL at 08:10

## 2017-05-30 RX ADMIN — IPRATROPIUM BROMIDE SCH MG: 0.5 SOLUTION RESPIRATORY (INHALATION) at 03:00

## 2017-05-30 RX ADMIN — ROPINIROLE HYDROCHLORIDE SCH MG: 1 TABLET, FILM COATED ORAL at 08:11

## 2017-05-30 RX ADMIN — FUROSEMIDE SCH MG: 20 TABLET ORAL at 08:11

## 2017-05-30 RX ADMIN — PRAVASTATIN SODIUM SCH MG: 10 TABLET ORAL at 08:12

## 2017-05-30 RX ADMIN — FERROUS SULFATE TAB EC 325 MG (65 MG FE EQUIVALENT) SCH MG: 325 (65 FE) TABLET DELAYED RESPONSE at 08:11

## 2017-05-30 RX ADMIN — HYDRALAZINE HYDROCHLORIDE SCH MG: 10 TABLET ORAL at 12:49

## 2017-05-30 RX ADMIN — INSULIN GLARGINE SCH UNIT: 100 INJECTION, SOLUTION SUBCUTANEOUS at 08:52

## 2017-05-30 RX ADMIN — TOLTERODINE TARTRATE SCH MG: 2 TABLET, FILM COATED ORAL at 08:11

## 2017-05-30 RX ADMIN — LISINOPRIL SCH MG: 10 TABLET ORAL at 08:12

## 2017-05-30 RX ADMIN — IPRATROPIUM BROMIDE SCH MG: 0.5 SOLUTION RESPIRATORY (INHALATION) at 15:09

## 2017-05-30 RX ADMIN — HEPARIN SODIUM SCH UNIT: 10000 INJECTION, SOLUTION INTRAVENOUS; SUBCUTANEOUS at 21:06

## 2017-05-30 RX ADMIN — LEVALBUTEROL SCH MG: 1.25 SOLUTION, CONCENTRATE RESPIRATORY (INHALATION) at 15:09

## 2017-05-30 RX ADMIN — TOLTERODINE TARTRATE SCH MG: 2 TABLET, FILM COATED ORAL at 20:57

## 2017-05-30 RX ADMIN — METHYLPREDNISOLONE SODIUM SUCCINATE SCH MLS/MIN: 1 INJECTION, POWDER, FOR SOLUTION INTRAMUSCULAR; INTRAVENOUS at 18:28

## 2017-05-30 RX ADMIN — FLUOXETINE SCH MG: 20 CAPSULE ORAL at 08:12

## 2017-05-30 RX ADMIN — HYDRALAZINE HYDROCHLORIDE SCH MG: 10 TABLET ORAL at 08:11

## 2017-05-30 RX ADMIN — BUDESONIDE AND FORMOTEROL FUMARATE DIHYDRATE SCH PUFFS: 160; 4.5 AEROSOL RESPIRATORY (INHALATION) at 20:56

## 2017-05-30 RX ADMIN — INSULIN ASPART SCH UNITS: 100 INJECTION, SOLUTION INTRAVENOUS; SUBCUTANEOUS at 18:27

## 2017-05-30 RX ADMIN — INSULIN ASPART SCH UNITS: 100 INJECTION, SOLUTION INTRAVENOUS; SUBCUTANEOUS at 13:00

## 2017-05-30 RX ADMIN — ROFLUMILAST SCH MCG: 500 TABLET ORAL at 08:11

## 2017-05-30 RX ADMIN — NYSTATIN PRN APPLN: 100000 POWDER TOPICAL at 18:28

## 2017-05-30 RX ADMIN — METOPROLOL SUCCINATE SCH MG: 50 TABLET, EXTENDED RELEASE ORAL at 08:08

## 2017-05-30 RX ADMIN — ROPINIROLE HYDROCHLORIDE SCH MG: 1 TABLET, FILM COATED ORAL at 12:50

## 2017-05-30 RX ADMIN — INSULIN ASPART SCH UNITS: 100 INJECTION, SOLUTION INTRAVENOUS; SUBCUTANEOUS at 08:52

## 2017-05-30 RX ADMIN — INSULIN ASPART SCH UNITS: 100 INJECTION, SOLUTION INTRAVENOUS; SUBCUTANEOUS at 20:59

## 2017-05-30 RX ADMIN — IPRATROPIUM BROMIDE SCH MG: 0.5 SOLUTION RESPIRATORY (INHALATION) at 19:10

## 2017-05-30 RX ADMIN — ROPINIROLE HYDROCHLORIDE SCH MG: 1 TABLET, FILM COATED ORAL at 20:57

## 2017-05-30 RX ADMIN — IPRATROPIUM BROMIDE SCH MG: 0.5 SOLUTION RESPIRATORY (INHALATION) at 07:26

## 2017-05-30 RX ADMIN — METHYLPREDNISOLONE SODIUM SUCCINATE SCH MLS/MIN: 1 INJECTION, POWDER, FOR SOLUTION INTRAMUSCULAR; INTRAVENOUS at 06:09

## 2017-05-30 RX ADMIN — Medication SCH MCG: at 08:08

## 2017-05-30 RX ADMIN — LEVALBUTEROL SCH MG: 1.25 SOLUTION, CONCENTRATE RESPIRATORY (INHALATION) at 03:00

## 2017-05-30 RX ADMIN — HYDRALAZINE HYDROCHLORIDE SCH MG: 10 TABLET ORAL at 20:56

## 2017-05-30 RX ADMIN — SPIRONOLACTONE SCH MG: 25 TABLET, FILM COATED ORAL at 08:11

## 2017-05-30 RX ADMIN — LEVALBUTEROL SCH MG: 1.25 SOLUTION, CONCENTRATE RESPIRATORY (INHALATION) at 19:10

## 2017-05-30 RX ADMIN — LEVALBUTEROL SCH MG: 1.25 SOLUTION, CONCENTRATE RESPIRATORY (INHALATION) at 07:26

## 2017-05-30 RX ADMIN — BUDESONIDE AND FORMOTEROL FUMARATE DIHYDRATE SCH PUFFS: 160; 4.5 AEROSOL RESPIRATORY (INHALATION) at 08:07

## 2017-05-30 NOTE — PHARMACY PROGRESS NOTE
Glycemic Control:  Progress Nt


Date of Service


May 30, 2017.





Scope


Glycemic Pharmacist consulted by Dr Skinner on 5/25/17 for glycemic control 

and to write orders per [facility] inpatient glycemic control protocol.





Objective


Accuchecks BSG (last 24hrs):











Test


  5/29/17


12:06 5/29/17


16:53 5/29/17


20:25 5/30/17


07:39


 


Bedside Glucose


  161 mg/dl


(70-90) 93 mg/dl


(70-90) 110 mg/dl


(70-90) 125 mg/dl


(70-90)








HbA1c:





 








Test


  5/26/17


06:20


 


Hemoglobin A1c


  6.6 %


(4.5-5.6)  H











Recent Pertinent Medications


Outpatient Anti-diabetic Regimen:


* Glipizide 5mg PO BIDM 








The patient is currently receiving:


* Basal insulin:             Lantus 30 units SQ Q24hrs - given in the morning





* Correctional Insulin:   Novolog Correction per scale ACHS


                          Goal Range: Low 110 mg/dL - High 140 mg/dL


                          Correction Factor: 15 mg/dL/unit





* Prandial insulin:         Per carb ratio of 1 unit per 5 grams CHO consumed








* Oral Agents:           On hold for admission 








Risk Factors for Insulin Resistance:


* Steroids - Solu Medrol 40 mg IV q8h --> 40 mg IV q12h on 5/30


* Diet





Assessment & Plan


ASSESSMENT:


Initial:


* Pt known to pharmacy from previous admissions/glycemic consults


 * Patient was admitted to [facility] in January of this year. She was on the same 

dose of steroids (40 mg IV q8) and required 26 units of Lantus BID plus 11-32 

units of bolus insulin per day.


 * Outpatient glipizide on hold for admission and pt is being treated wit SQ 

basal bolus insulin regimen for steroid induced hyperglycemia


5/30/17:


* Pt continues on high dose RTC steroids


 * Solumedrol 40mg IV Q8hrs -> taper to q12h today


* Initiated SQ basal bolus insulin regimen on 5/25 based on weight/high stress. 

Titrating dosing daily based on BSG trends and steroid dosing


* Pt required 61-66 units per day over the past 48 hours. BSGs ranging 93 - 161 

mg/dl over the past 24hrs


 * Preferred distribution of basal:prandial insulin for steroid induced 

hyperglycemia is ~30-40% basal:60-70% prandial as steroids have their most 

profound effect on post-prandial hyperglycemia. Also, do not want too much 

additional basal insulin on board in the event that steroids are rapidly 

tapered - this would lead to hypoglycemia. 


 * Fasting BSG within goal. Will change to Lantus per scale starting with am 

dose on 5/31 incase steroid taper warrants change to basal requirements.


* I anticipate correctional/prandial needs to start decreasing due to tapering 

of steroids


 * will loosen CF and CR parameters starting with dinner








PLAN FOR INPATIENT GLYCEMIC CONTROL:  


* Hold outpatient oral diabetes medications





* Basal insulin 


 * Lantus 25-30 units SQ in AM 


 * 25 units for BSG less than 140 md/dL


 * 30 units for  mg/dL or greater





* Bolus Insulin


 * NovoLog ACHS 


 * Goal Range:  Low 110 mg/dL - High 140 mg/dL


 * Correction Factor: 15 mg/dL/unit


 * Nutritional / Prandial insulin per carb ratio of 1 unit per 5 grams CHO 

consumed


 * Change to Correction factor of 20 and carb ratio of 6 @ HS





* Taper insulin regimen with each step down in steroid dosing. 





* Please note that the plan above was derived based on current level of insulin 

resistance and hospital stress. These recommendations are appropriate for 

inpatient admission only. Plan of care upon discharge will need to be 

reassessed to avoid potential outpatient hypo/hyperglycemia. 








Thank you.








Looking ahead to discharge:


* Pt is maintained on glipizide as an outpatient with adequate control per 

recent A1c. Insulin most likely not needed at discharge but pt may benefit from 

once daily NPH if high dose prednisone taper is continued at discharge.

## 2017-05-30 NOTE — PROGRESS NOTE
Subjective


Date of Service:


May 30, 2017.


Subjective


this pt is not really improving greatly is stable at rest but has significant 

dyspnea on exertion, including hypoxia on exertion.  she has little cough since 

coming to hospital unlike when she was at home with much rhinorrhea and coughing





Problem List


Medical Problems:


(1) Cellulitis


Status: Acute  





(2) COPD exacerbation


Status: Acute  





(3) COPD exacerbation


Status: Acute  





(4) Hypoxia


Status: Acute  





(5) SOB (shortness of breath)


Status: Acute  











Review of Systems


Constitutional:  + fatigue, + weakness, No chills, No fever


Respiratory:  + dyspnea on exertion, + shortness of breath, No cough, No 

dyspnea at rest, No sputum


Cardiac:  No PND, No chest pain, No orthopnea


Abdomen:  No diarrhea, No nausea, No pain, No vomiting


Musculoskeletal:  No joint pain, No muscle pain


Neurologic:  No memory loss, No paralysis, No weakness


Psychiatric:  No anhedonism, No depression symptoms





Objective


Vital Signs











  Date Time  Temp Pulse Resp B/P Pulse Ox O2 Delivery O2 Flow Rate FiO2


 


5/30/17 12:45  70  153/75    


 


5/30/17 10:19     78   


 


5/30/17 08:00     95 Nasal Cannula 3.0 


 


5/30/17 07:26  85 18  95 Nasal Cannula 3.0 


 


5/30/17 07:21 36.6 60 18 161/84 95  3.0 


 


5/30/17 00:00      Nasal Cannula 3.0 


 


5/29/17 18:45  88 18  93 Nasal Cannula 3.0 


 


5/29/17 17:24 36.8 63 18 150/83 94  3.0 


 


5/29/17 16:30      Nasal Cannula 3.0 


 


5/29/17 14:12  67  153/84    











Physical Exam


General Appearance:  WD/WN, + moderate distress


Neck:  supple, trachea midline


Respiratory/Chest:  + respiratory distress, + decreased breath sounds, + 

accessory muscle use


Cardiovascular:  regular rate, rhythm, no murmur


Abdomen:  normal bowel sounds, non tender, soft


Extremities:  no pedal edema, no calf tenderness


Neurologic/Psychiatric:  alert, oriented x 3





Laboratory Results





Last 24 Hours








Test


  5/29/17


16:53 5/29/17


20:25 5/30/17


07:39 5/30/17


11:34


 


Bedside Glucose 93 mg/dl  110 mg/dl  125 mg/dl  193 mg/dl 











Assessment and Plan


71 y/o female with admitted on May 25 2017 because of COPD exacerbation, 





COPD exacerbation: not really getting any better, may need placement for care 


Chronic respiratory failure with hypoxia and home O2 dependent


 Solu-Medrol reduced to bid, nebulizer treatment,  Symbicort (she takes Qvar as 

outpatient), added Daliresp,


Per pulmonologist start Tudorza when she is closing in her discharge(or spiriva)

, consider trilogy at discharge





chronic diastolic heart failure, continues to be stable





acute on chronic renal failure stage III, resolved





oral thrush: improved with nystatin mouthwash 





Urinary incontinence--> urge incontinence- tolteridine 2 mg BID ordered 

improved will follow up as outpt


Continued [facility] stay due to:  multiple IV medications needed


Discharge planning:  home

## 2017-05-30 NOTE — PULMONOLOGY PROGRESS NOTE
Pulmonary Progress Note


Date of Service


May 30, 2017.





Attending








Subjective


No new complaints


Tolerating daliresp so far


Has never had pulmonary rehab according to her





Objective


General Appearance:  no distress, obese


Eyes:  PERRLA


ENT:  NORMAL THROAT EXAM


Neck:  NO TENDERNESS, TRACHEA MIDLINE


Respiratory:  clear, good air entry b/l


Cardiovasular:  REGULAR RATE/RHYTHM, NORMAL S1S2


Abdomen:  NON TENDER, NO REBOUND, NO GUARDING


Upper Extremities:  NO EDEMA


Lower Extremities:  edema (bilateral) improving


Neuro:  ALERT, ORIENTED x 4





Assessment & Plan


72 year old female with advanced emphysema, presents with an exacerbation 

episode.





Plan: decrease solumedrol to q 12 hrs


continue neb rx q 6 hrs


add spiriva or tudorza prior to discharge


Her weight is also contributing to her shortness of breath


advise weight loss


continue daliresp : daliresp also can cause weight loss which might benefit the 

patient


needs pul rehab as outpatient


continue o2 3L at rest and 5L on exertion ( o2 sats drop to 78% on exertion on 

3L )


evaluate for trilogy before discharge





Data


Medications:





 Current Inpatient Medications








 Medications


  (Trade)  Dose


 Ordered  Sig/Roya


 Route  Start Time


 Stop Time Status Last Admin


Dose Admin


 


 Acetaminophen


  (Tylenol Tab)  650 mg  Q4H  PRN


 PO  5/25/17 13:45


 6/24/17 13:44  5/25/17 23:53


650 MG


 


 Polyethylene


  (Miralax Powder


 Packet)  17 gm  DAILY  PRN


 PO  5/25/17 14:00


 6/24/17 13:59   


 


 


 Ondansetron HCl


  (Zofran Inj)  4 mg  Q6H  PRN


 IV  5/25/17 13:45


 6/24/17 13:44   


 


 


 Cyanocobalamin


  (Vitamin B-12


 Tab)  1,000 mcg  DAILY


 PO  5/26/17 08:00


 6/25/17 07:59  5/30/17 08:08


1,000 MCG


 


 Fluoxetine HCl


  (Prozac Cap)  20 mg  QAM


 PO  5/26/17 08:00


 6/25/17 07:59  5/30/17 08:12


20 MG


 


 Furosemide


  (Lasix Tab)  20 mg  DAILY


 PO  5/26/17 08:00


 6/25/17 07:59 Future hold 5/30/17 08:11


20 MG


 


 Lisinopril


  (Zestril Tab)  10 mg  DAILY


 PO  5/26/17 08:00


 6/25/17 07:59 Future hold 5/30/17 08:12


10 MG


 


 Metoprolol


 Succinate


  (Toprol Xl Tab)  100 mg  DAILY


 PO  5/26/17 08:00


 6/25/17 07:59  5/30/17 08:08


100 MG


 


 Pravastatin Sodium


  (Pravachol Tab)  10 mg  DAILY


 PO  5/26/17 08:00


 6/25/17 07:59  5/30/17 08:12


10 MG


 


 Ropinirole HCl


  (Requip Tab)  2 mg  TID


 PO  5/25/17 14:00


 6/24/17 13:59  5/30/17 08:11


2 MG


 


 Spironolactone


  (Aldactone Tab)  12.5 mg  QAM


 PO  5/26/17 08:00


 6/25/17 07:59 Future hold 5/30/17 08:11


12.5 MG


 


 Tramadol HCl


  (Ultram Tab)  50 mg  Q6  PRN


 PO  5/25/17 13:45


 6/24/17 13:44   


 


 


 Ferrous Sulfate


  (Feosol Tab)  325 mg  DAILY


 PO  5/26/17 08:00


 6/25/17 07:59  5/30/17 08:11


325 MG


 


 Insulin Aspart


  (novoLOG ASPART)  **SLIDING


 SCALE**


 **If C...  ACHS


 SC  5/25/17 16:30


 6/24/17 16:29  5/30/17 08:52


10 UNITS


 


 Glucose


  (Glucose 40% Gel)  15-30


 GRAMS 15


 GRAMS...  UD  PRN


 PO  5/25/17 13:45


 6/24/17 13:44   


 


 


 Glucose


  (Glucose Chew


 Tab)  4-8


 Tablets 4


 Tabl...  UD  PRN


 PO  5/25/17 13:45


 6/24/17 13:44   


 


 


 Dextrose


  (Dextrose 50%


 50ML Syringe)  25-50ML OF


 50% DW IV


 FOR...  UD  PRN


 IV  5/25/17 13:45


 6/24/17 13:44   


 


 


 Glucagon


  (Glucagon Inj)  1 mg  UD  PRN


 SQ  5/25/17 13:45


 6/24/17 13:44   


 


 


 Miscellaneous


 Information


  (Consult


 Glycemic


 Management


 Pharmacy)  1 ea  UD  PRN


 N/A  5/25/17 14:00


 6/24/17 13:59   


 


 


 Guaifenesin


  (Organidin Nr


 Tab)  200 mg  Q4H  PRN


 PO  5/25/17 15:30


 6/24/17 15:29   


 


 


 Ipratropium


 Bromide


  (Atrovent 0.02%


 0.5MG/2.5ML Neb)  0.5 mg  Q6R


 INH  5/25/17 21:00


 6/24/17 20:59  5/30/17 07:26


0.5 MG


 


 Levalbuterol 1.25


 mg  1.25 mg  Q6R


 INH  5/25/17 21:00


 6/24/17 20:59  5/30/17 07:26


1.25 MG


 


 Methylprednisolone


 Sodium Succinate/


 Syringe


  (Solu-Medrol IV/


 Syringe)  0.64 ml @ 


 1.5 mls/min  Q8


 IV  5/26/17 14:00


 6/25/17 13:59  5/30/17 06:09


1.5 MLS/MIN


 


 Insulin Glargine


  (Lantus Solostar


 Pen)  30 unit  DAILY


 SC  5/27/17 08:00


 6/26/17 07:59  5/30/17 08:52


30 UNIT


 


 Hydralazine HCl


  (Apresoline Tab)  10 mg  TID


 PO  5/27/17 08:00


 6/26/17 07:59  5/30/17 08:11


10 MG


 


 Heparin Sodium


  (Porcine)


  (Heparin Sq 5000


 Unit/0.5ml)  5,000 unit  Q12


 SQ  5/27/17 09:00


 6/26/17 08:59  5/30/17 08:53


5,000 UNIT


 


 Nystatin


  (Mycostatin Susp)  10 ml  QID


 PO  5/27/17 12:00


 5/30/17 11:59  5/30/17 08:10


10 ML


 


 Hydralazine HCl


  (HydrALAZINE INJ)  20 mg  Q6H  PRN


 IV.  5/27/17 14:30


 6/26/17 14:29   


 


 


 Tolterodine


 Tartrate


  (Detrol Tab)  2 mg  BID


 PO  5/28/17 20:00


 6/27/17 19:59  5/30/17 08:11


2 MG


 


 Budesonide/


 Formoterol


 Fumarate


  (Symbicort 160/


 4.5 Inh)  2 puffs  BID


 INH  5/28/17 20:00


 6/27/17 19:59  5/30/17 08:07


2 PUFFS


 


 Roflumilast


  (Daliresp Tab)  500 mcg  DAILY


 PO  5/29/17 08:00


 6/28/17 07:59  5/30/17 08:11


500 MCG








I & O:











 24-Hour Column 


 


 5/30/17





 08:00


 


Intake Total 480 ml


 


Output Total 2400 ml


 


Balance -1920 ml








Vital Signs:











  Date Time  Temp Pulse Resp B/P Pulse Ox O2 Delivery O2 Flow Rate FiO2


 


5/30/17 08:00     95 Nasal Cannula 3.0 


 


5/30/17 07:26  85 18  95 Nasal Cannula 3.0 


 


5/30/17 07:21 36.6 60 18 161/84 95  3.0 


 


5/30/17 00:00      Nasal Cannula 3.0 


 


5/29/17 18:45  88 18  93 Nasal Cannula 3.0 


 


5/29/17 17:24 36.8 63 18 150/83 94  3.0 


 


5/29/17 16:30      Nasal Cannula 3.0 


 


5/29/17 14:12  67  153/84    








Laboratory Results:





Last 24 Hours








Test


  5/29/17


12:06 5/29/17


16:53 5/29/17


20:25 5/30/17


07:39


 


Bedside Glucose 161 mg/dl  93 mg/dl  110 mg/dl  125 mg/dl

## 2017-05-31 VITALS
TEMPERATURE: 98.06 F | DIASTOLIC BLOOD PRESSURE: 81 MMHG | HEART RATE: 69 BPM | SYSTOLIC BLOOD PRESSURE: 151 MMHG | OXYGEN SATURATION: 93 %

## 2017-05-31 VITALS — HEART RATE: 73 BPM | DIASTOLIC BLOOD PRESSURE: 83 MMHG | SYSTOLIC BLOOD PRESSURE: 146 MMHG

## 2017-05-31 VITALS
HEART RATE: 64 BPM | SYSTOLIC BLOOD PRESSURE: 152 MMHG | DIASTOLIC BLOOD PRESSURE: 81 MMHG | OXYGEN SATURATION: 93 % | TEMPERATURE: 97.7 F

## 2017-05-31 VITALS
SYSTOLIC BLOOD PRESSURE: 167 MMHG | TEMPERATURE: 97.16 F | DIASTOLIC BLOOD PRESSURE: 91 MMHG | OXYGEN SATURATION: 100 % | HEART RATE: 62 BPM

## 2017-05-31 VITALS — HEART RATE: 69 BPM | SYSTOLIC BLOOD PRESSURE: 153 MMHG | DIASTOLIC BLOOD PRESSURE: 81 MMHG

## 2017-05-31 VITALS
HEART RATE: 66 BPM | SYSTOLIC BLOOD PRESSURE: 138 MMHG | TEMPERATURE: 98.24 F | OXYGEN SATURATION: 99 % | DIASTOLIC BLOOD PRESSURE: 76 MMHG

## 2017-05-31 VITALS — HEART RATE: 71 BPM | OXYGEN SATURATION: 96 %

## 2017-05-31 VITALS — OXYGEN SATURATION: 97 % | HEART RATE: 75 BPM

## 2017-05-31 VITALS — OXYGEN SATURATION: 85 %

## 2017-05-31 VITALS — HEART RATE: 64 BPM | OXYGEN SATURATION: 98 %

## 2017-05-31 VITALS — OXYGEN SATURATION: 100 %

## 2017-05-31 LAB
ANION GAP SERPL CALC-SCNC: 8 MMOL/L (ref 3–11)
BUN SERPL-MCNC: 53 MG/DL (ref 7–18)
BUN/CREAT SERPL: 33.3 (ref 10–20)
CALCIUM SERPL-MCNC: 8.6 MG/DL (ref 8.5–10.1)
CHLORIDE SERPL-SCNC: 97 MMOL/L (ref 98–107)
CO2 SERPL-SCNC: 36 MMOL/L (ref 21–32)
CREAT CL PREDICTED SERPL C-G-VRATE: 35.5 ML/MIN
CREAT SERPL-MCNC: 1.6 MG/DL (ref 0.6–1.2)
EOSINOPHIL NFR BLD AUTO: 211 K/UL (ref 130–400)
GLUCOSE SERPL-MCNC: 94 MG/DL (ref 70–99)
HCT VFR BLD CALC: 41 % (ref 37–47)
MCH RBC QN AUTO: 30.5 PG (ref 25–34)
MCHC RBC AUTO-ENTMCNC: 30.5 G/DL (ref 32–36)
MCV RBC AUTO: 100 FL (ref 80–100)
PMV BLD AUTO: 10.6 FL (ref 7.4–10.4)
POTASSIUM SERPL-SCNC: 4.1 MMOL/L (ref 3.5–5.1)
RBC # BLD AUTO: 4.1 M/UL (ref 4.2–5.4)
SODIUM SERPL-SCNC: 141 MMOL/L (ref 136–145)
WBC # BLD AUTO: 11.17 K/UL (ref 4.8–10.8)

## 2017-05-31 RX ADMIN — INSULIN ASPART SCH UNITS: 100 INJECTION, SOLUTION INTRAVENOUS; SUBCUTANEOUS at 08:52

## 2017-05-31 RX ADMIN — Medication SCH MCG: at 07:36

## 2017-05-31 RX ADMIN — LEVALBUTEROL SCH MG: 1.25 SOLUTION, CONCENTRATE RESPIRATORY (INHALATION) at 07:02

## 2017-05-31 RX ADMIN — IPRATROPIUM BROMIDE SCH MG: 0.5 SOLUTION RESPIRATORY (INHALATION) at 19:07

## 2017-05-31 RX ADMIN — INSULIN ASPART SCH UNITS: 100 INJECTION, SOLUTION INTRAVENOUS; SUBCUTANEOUS at 12:41

## 2017-05-31 RX ADMIN — TOLTERODINE TARTRATE SCH MG: 2 TABLET, FILM COATED ORAL at 19:39

## 2017-05-31 RX ADMIN — HEPARIN SODIUM SCH UNIT: 10000 INJECTION, SOLUTION INTRAVENOUS; SUBCUTANEOUS at 08:53

## 2017-05-31 RX ADMIN — NYSTATIN PRN APPLN: 100000 POWDER TOPICAL at 16:41

## 2017-05-31 RX ADMIN — INSULIN ASPART SCH UNITS: 100 INJECTION, SOLUTION INTRAVENOUS; SUBCUTANEOUS at 20:55

## 2017-05-31 RX ADMIN — HYDRALAZINE HYDROCHLORIDE SCH MG: 10 TABLET ORAL at 13:49

## 2017-05-31 RX ADMIN — FERROUS SULFATE TAB EC 325 MG (65 MG FE EQUIVALENT) SCH MG: 325 (65 FE) TABLET DELAYED RESPONSE at 07:36

## 2017-05-31 RX ADMIN — METOPROLOL SUCCINATE SCH MG: 50 TABLET, EXTENDED RELEASE ORAL at 07:36

## 2017-05-31 RX ADMIN — HYDRALAZINE HYDROCHLORIDE SCH MG: 10 TABLET ORAL at 19:38

## 2017-05-31 RX ADMIN — ROPINIROLE HYDROCHLORIDE SCH MG: 1 TABLET, FILM COATED ORAL at 13:49

## 2017-05-31 RX ADMIN — IPRATROPIUM BROMIDE SCH MG: 0.5 SOLUTION RESPIRATORY (INHALATION) at 07:02

## 2017-05-31 RX ADMIN — FUROSEMIDE SCH MG: 20 TABLET ORAL at 07:38

## 2017-05-31 RX ADMIN — LEVALBUTEROL SCH MG: 1.25 SOLUTION, CONCENTRATE RESPIRATORY (INHALATION) at 19:07

## 2017-05-31 RX ADMIN — FLUOXETINE SCH MG: 20 CAPSULE ORAL at 07:37

## 2017-05-31 RX ADMIN — LEVALBUTEROL SCH MG: 1.25 SOLUTION, CONCENTRATE RESPIRATORY (INHALATION) at 14:10

## 2017-05-31 RX ADMIN — HYDRALAZINE HYDROCHLORIDE SCH MG: 10 TABLET ORAL at 07:35

## 2017-05-31 RX ADMIN — SPIRONOLACTONE SCH MG: 25 TABLET, FILM COATED ORAL at 07:37

## 2017-05-31 RX ADMIN — INSULIN ASPART SCH UNITS: 100 INJECTION, SOLUTION INTRAVENOUS; SUBCUTANEOUS at 18:47

## 2017-05-31 RX ADMIN — NYSTATIN PRN APPLN: 100000 POWDER TOPICAL at 07:38

## 2017-05-31 RX ADMIN — BUDESONIDE AND FORMOTEROL FUMARATE DIHYDRATE SCH PUFFS: 160; 4.5 AEROSOL RESPIRATORY (INHALATION) at 07:38

## 2017-05-31 RX ADMIN — BUDESONIDE AND FORMOTEROL FUMARATE DIHYDRATE SCH PUFFS: 160; 4.5 AEROSOL RESPIRATORY (INHALATION) at 19:37

## 2017-05-31 RX ADMIN — METHYLPREDNISOLONE SODIUM SUCCINATE SCH MLS/MIN: 1 INJECTION, POWDER, FOR SOLUTION INTRAMUSCULAR; INTRAVENOUS at 07:38

## 2017-05-31 RX ADMIN — TOLTERODINE TARTRATE SCH MG: 2 TABLET, FILM COATED ORAL at 07:37

## 2017-05-31 RX ADMIN — LEVALBUTEROL SCH MG: 1.25 SOLUTION, CONCENTRATE RESPIRATORY (INHALATION) at 01:32

## 2017-05-31 RX ADMIN — LISINOPRIL SCH MG: 10 TABLET ORAL at 07:37

## 2017-05-31 RX ADMIN — HEPARIN SODIUM SCH UNIT: 10000 INJECTION, SOLUTION INTRAVENOUS; SUBCUTANEOUS at 20:56

## 2017-05-31 RX ADMIN — ROFLUMILAST SCH MCG: 500 TABLET ORAL at 07:37

## 2017-05-31 RX ADMIN — IPRATROPIUM BROMIDE SCH MG: 0.5 SOLUTION RESPIRATORY (INHALATION) at 01:32

## 2017-05-31 RX ADMIN — ROPINIROLE HYDROCHLORIDE SCH MG: 1 TABLET, FILM COATED ORAL at 19:39

## 2017-05-31 RX ADMIN — ROPINIROLE HYDROCHLORIDE SCH MG: 1 TABLET, FILM COATED ORAL at 07:36

## 2017-05-31 RX ADMIN — PRAVASTATIN SODIUM SCH MG: 10 TABLET ORAL at 07:37

## 2017-05-31 RX ADMIN — IPRATROPIUM BROMIDE SCH MG: 0.5 SOLUTION RESPIRATORY (INHALATION) at 14:10

## 2017-05-31 NOTE — PROGRESS NOTE
Subjective


Date of Service:


May 31, 2017.


Subjective


This pt remains very short of breath, mostly with exertion, significant 

desaturations, pt understands in the short corona she want to go to subacute 

rehab and considers [facility]





Problem List


Medical Problems:


(1) Cellulitis


Status: Acute  





(2) COPD exacerbation


Status: Acute  





(3) COPD exacerbation


Status: Acute  





(4) Hypoxia


Status: Acute  





(5) SOB (shortness of breath)


Status: Acute  











Review of Systems


Constitutional:  No chills, No fever


Respiratory:  + cough, + dyspnea on exertion, + shortness of breath, No dyspnea 

at rest, No sputum, No wheezing


Cardiac:  No PND, No chest pain, No orthopnea


Abdomen:  No diarrhea, No nausea, No pain, No vomiting


Female :  No dysuria, No urinary frequency


Psychiatric:  No anhedonism, No depression symptoms





Objective


Vital Signs











  Date Time  Temp Pulse Resp B/P Pulse Ox O2 Delivery O2 Flow Rate FiO2


 


5/31/17 16:57      Room Air  


 


5/31/17 14:43 36.7 69 22 151/81 93 Nasal Cannula 4.0 


 


5/31/17 14:10  71 16  96 Nasal Cannula 3.0 


 


5/31/17 13:51  69  153/81    


 


5/31/17 10:29     85   


 


5/31/17 08:00     100 Nasal Cannula 3.0 


 


5/31/17 07:49 36.2 62 20 167/91 100 Nasal Cannula 4.0 





    162/83    


 


5/31/17 07:03  64 16  98 Nasal Cannula 3.0 


 


5/31/17 01:33  71 16  96 Nasal Cannula 3.0 


 


5/31/17 00:07 36.5 64 20 152/81 93 Nasal Cannula 4.0 


 


5/31/17 00:00      Nasal Cannula 3.0 


 


5/30/17 21:00  64 18 151/71 94 Nasal Cannula 3.0 


 


5/30/17 19:10  71 16  96 Nasal Cannula 3.0 











Physical Exam


General Appearance:  WD/WN, + moderate distress


Eyes:  PERRL, EOMI


Neck:  supple, no JVD


Respiratory/Chest:  + decreased breath sounds, + accessory muscle use


Cardiovascular:  regular rate, rhythm, no murmur


Abdomen:  normal bowel sounds, non tender, soft


Extremities:  no pedal edema, no calf tenderness


Neurologic/Psychiatric:  alert, oriented x 3





Laboratory Results





Last 24 Hours








Test


  5/30/17


20:35 5/31/17


06:30 5/31/17


08:00 5/31/17


11:20


 


Bedside Glucose 72 mg/dl   91 mg/dl  134 mg/dl 


 


White Blood Count  11.17 K/uL   


 


Red Blood Count  4.10 M/uL   


 


Hemoglobin  12.5 g/dL   


 


Hematocrit  41.0 %   


 


Mean Corpuscular Volume  100.0 fL   


 


Mean Corpuscular Hemoglobin  30.5 pg   


 


Mean Corpuscular Hemoglobin


Concent 


  30.5 g/dl 


  


  


 


 


RDW Standard Deviation  47.9 fL   


 


RDW Coefficient of Variation  13.1 %   


 


Platelet Count  211 K/uL   


 


Mean Platelet Volume  10.6 fL   


 


Sodium Level  141 mmol/L   


 


Potassium Level  4.1 mmol/L   


 


Chloride Level  97 mmol/L   


 


Carbon Dioxide Level  36 mmol/L   


 


Anion Gap  8.0 mmol/L   


 


Blood Urea Nitrogen  53 mg/dl   


 


Creatinine  1.60 mg/dl   


 


Est Creatinine Clear Calc


Drug Dose 


  35.5 ml/min 


  


  


 


 


Estimated GFR (


American) 


  36.4 


  


  


 


 


Estimated GFR (Non-


American 


  31.4 


  


  


 


 


BUN/Creatinine Ratio  33.3   


 


Random Glucose  94 mg/dl   


 


Calcium Level  8.6 mg/dl   














Test


  5/31/17


17:16 


  


  


 


 


Bedside Glucose 162 mg/dl    











Assessment and Plan


71 y/o female with admitted on May 25 2017 because of COPD exacerbation, 





COPD exacerbation: very hypoxic with exertion


Chronic respiratory failure with hypoxia and home O2 dependent


 Solu-Medrol now transitioned to po prednisone nebulizer treatment,  Symbicort (

she takes Qvar as outpatient), added Daliresp,


Per pulmonologist start Tudorza when she is closing in her discharge(or spiriva)

, consider trilogy at discharge per pulmonary recommendations





chronic diastolic heart failure, her dyspnea seems pulmonary





acute on chronic renal failure stage III, resolved





oral thrush: improved with nystatin mouthwash 





Urinary incontinence--> urge incontinence- tolteridine 2 mg BID ordered 

improved will follow up as outpt


Continued [facility] stay due to:  multiple IV medications needed


Discharge planning:  home

## 2017-05-31 NOTE — PULMONOLOGY PROGRESS NOTE
Pulmonary Progress Note


Date of Service


May 31, 2017.





Attending








Subjective


no new complaints





Objective


General Appearance:  no distress, obese


Eyes:  PERRLA


ENT:  NORMAL THROAT EXAM


Neck:  NO TENDERNESS, TRACHEA MIDLINE


Respiratory:  clear, good air entry b/l


Cardiovasular:  REGULAR RATE/RHYTHM, NORMAL S1S2


Abdomen:  NON TENDER, NO REBOUND, NO GUARDING


Upper Extremities:  NO EDEMA


Lower Extremities:  edema (bilateral) improving


Neuro:  ALERT, ORIENTED x 4





Assessment & Plan


72 year old female with advanced emphysema, presents with an exacerbation 

episode.





Plan: 


d/c iv solumedrol


start po prednisone 50 mg  day , taper by 10 mg QOD until d/c


continue neb rx q 6 hrs


add spiriva or tudorza prior to discharge


Her weight is also contributing to her shortness of breath


advise weight loss


continue daliresp : daliresp also can cause weight loss which might benefit the 

patient


needs pul rehab as outpatient


continue o2 3L at rest and 5L on exertion ( o2 sats drop to 78% on exertion on 

3L )


evaluate for trilogy before discharge


continue pt/ot


patient appears to be open today for discussion about going to a rehab center 

first





Data


Medications:





 Current Inpatient Medications








 Medications


  (Trade)  Dose


 Ordered  Sig/Roya


 Route  Start Time


 Stop Time Status Last Admin


Dose Admin


 


 Acetaminophen


  (Tylenol Tab)  650 mg  Q4H  PRN


 PO  5/25/17 13:45


 6/24/17 13:44  5/25/17 23:53


650 MG


 


 Polyethylene


  (Miralax Powder


 Packet)  17 gm  DAILY  PRN


 PO  5/25/17 14:00


 6/24/17 13:59   


 


 


 Ondansetron HCl


  (Zofran Inj)  4 mg  Q6H  PRN


 IV  5/25/17 13:45


 6/24/17 13:44   


 


 


 Cyanocobalamin


  (Vitamin B-12


 Tab)  1,000 mcg  DAILY


 PO  5/26/17 08:00


 6/25/17 07:59  5/31/17 07:36


1,000 MCG


 


 Fluoxetine HCl


  (Prozac Cap)  20 mg  QAM


 PO  5/26/17 08:00


 6/25/17 07:59  5/31/17 07:37


20 MG


 


 Furosemide


  (Lasix Tab)  20 mg  DAILY


 PO  5/26/17 08:00


 6/25/17 07:59 Future hold 5/31/17 07:38


20 MG


 


 Lisinopril


  (Zestril Tab)  10 mg  DAILY


 PO  5/26/17 08:00


 6/25/17 07:59 Future hold 5/31/17 07:37


10 MG


 


 Metoprolol


 Succinate


  (Toprol Xl Tab)  100 mg  DAILY


 PO  5/26/17 08:00


 6/25/17 07:59  5/31/17 07:36


100 MG


 


 Pravastatin Sodium


  (Pravachol Tab)  10 mg  DAILY


 PO  5/26/17 08:00


 6/25/17 07:59  5/31/17 07:37


10 MG


 


 Ropinirole HCl


  (Requip Tab)  2 mg  TID


 PO  5/25/17 14:00


 6/24/17 13:59  5/31/17 07:36


2 MG


 


 Spironolactone


  (Aldactone Tab)  12.5 mg  QAM


 PO  5/26/17 08:00


 6/25/17 07:59 Future hold 5/31/17 07:37


12.5 MG


 


 Tramadol HCl


  (Ultram Tab)  50 mg  Q6  PRN


 PO  5/25/17 13:45


 6/24/17 13:44   


 


 


 Ferrous Sulfate


  (Feosol Tab)  325 mg  DAILY


 PO  5/26/17 08:00


 6/25/17 07:59  5/31/17 07:36


325 MG


 


 Glucose


  (Glucose 40% Gel)  15-30


 GRAMS 15


 GRAMS...  UD  PRN


 PO  5/25/17 13:45


 6/24/17 13:44   


 


 


 Glucose


  (Glucose Chew


 Tab)  4-8


 Tablets 4


 Tabl...  UD  PRN


 PO  5/25/17 13:45


 6/24/17 13:44   


 


 


 Dextrose


  (Dextrose 50%


 50ML Syringe)  25-50ML OF


 50% DW IV


 FOR...  UD  PRN


 IV  5/25/17 13:45


 6/24/17 13:44   


 


 


 Glucagon


  (Glucagon Inj)  1 mg  UD  PRN


 SQ  5/25/17 13:45


 6/24/17 13:44   


 


 


 Miscellaneous


 Information


  (Consult


 Glycemic


 Management


 Pharmacy)  1 ea  UD  PRN


 N/A  5/25/17 14:00


 6/24/17 13:59   


 


 


 Guaifenesin


  (Organidin Nr


 Tab)  200 mg  Q4H  PRN


 PO  5/25/17 15:30


 6/24/17 15:29   


 


 


 Ipratropium


 Bromide


  (Atrovent 0.02%


 0.5MG/2.5ML Neb)  0.5 mg  Q6R


 INH  5/25/17 21:00


 6/24/17 20:59  5/31/17 07:02


0.5 MG


 


 Levalbuterol


  (Xopenex 1.25MG/


 0.5ML Neb)  1.25 mg  Q6R


 INH  5/25/17 21:00


 6/24/17 20:59  5/31/17 07:02


1.25 MG


 


 Hydralazine HCl


  (Apresoline Tab)  10 mg  TID


 PO  5/27/17 08:00


 6/26/17 07:59  5/31/17 07:35


10 MG


 


 Heparin Sodium


  (Porcine)


  (Heparin Sq 5000


 Unit/0.5ml)  5,000 unit  Q12


 SQ  5/27/17 09:00


 6/26/17 08:59  5/31/17 08:53


5,000 UNIT


 


 Hydralazine HCl


  (HydrALAZINE INJ)  20 mg  Q6H  PRN


 IV.  5/27/17 14:30


 6/26/17 14:29   


 


 


 Tolterodine


 Tartrate


  (Detrol Tab)  2 mg  BID


 PO  5/28/17 20:00


 6/27/17 19:59  5/31/17 07:37


2 MG


 


 Budesonide/


 Formoterol


 Fumarate


  (Symbicort 160/


 4.5 Inh)  2 puffs  BID


 INH  5/28/17 20:00


 6/27/17 19:59  5/31/17 07:38


2 PUFFS


 


 Roflumilast


  (Daliresp Tab)  500 mcg  DAILY


 PO  5/29/17 08:00


 6/28/17 07:59  5/31/17 07:37


500 MCG


 


 Insulin Glargine


  (Lantus Solostar


 Pen)  see


 protocol


 text  DAILY


 SC  5/31/17 08:00


 6/30/17 07:59  5/31/17 08:53


25 UNIT


 


 Insulin Aspart


  (novoLOG ASPART)  **SLIDING


 SCALE**


 **If C...  ACHS


 SC  5/30/17 21:00


 6/29/17 20:59  5/31/17 08:52


4 UNITS


 


 Nystatin


  (Mycostatin


 Powder)  1 appln  PRN  PRN


 EXT  5/30/17 16:30


 6/29/17 16:29  5/31/17 07:38


1 APPLN








I & O:











 24-Hour Column 


 


 5/31/17





 08:00


 


Intake Total 1145 ml


 


Output Total 2100 ml


 


Balance -955 ml








Vital Signs:











  Date Time  Temp Pulse Resp B/P Pulse Ox O2 Delivery O2 Flow Rate FiO2


 


5/31/17 08:00     100 Nasal Cannula 3.0 


 


5/31/17 07:49 36.2 62 20 167/91 100 Nasal Cannula 4.0 





    162/83    


 


5/31/17 07:03  64 16  98 Nasal Cannula 3.0 


 


5/31/17 01:33  71 16  96 Nasal Cannula 3.0 


 


5/31/17 00:07 36.5 64 20 152/81 93 Nasal Cannula 4.0 


 


5/31/17 00:00      Nasal Cannula 3.0 


 


5/30/17 21:00  64 18 151/71 94 Nasal Cannula 3.0 


 


5/30/17 19:10  71 16  96 Nasal Cannula 3.0 


 


5/30/17 16:00      Nasal Cannula 3.0 


 


5/30/17 15:10  62 18  97 Nasal Cannula 3.0 


 


5/30/17 15:08 36.4 69 18 163/94 95  4.0 


 


5/30/17 12:45  70  153/75    








Laboratory Results:





Last 24 Hours








Test


  5/30/17


11:34 5/30/17


16:43 5/30/17


20:35 5/31/17


06:30


 


Bedside Glucose 193 mg/dl  70 mg/dl  72 mg/dl  


 


White Blood Count    11.17 K/uL 


 


Red Blood Count    4.10 M/uL 


 


Hemoglobin    12.5 g/dL 


 


Hematocrit    41.0 % 


 


Mean Corpuscular Volume    100.0 fL 


 


Mean Corpuscular Hemoglobin    30.5 pg 


 


Mean Corpuscular Hemoglobin


Concent 


  


  


  30.5 g/dl 


 


 


RDW Standard Deviation    47.9 fL 


 


RDW Coefficient of Variation    13.1 % 


 


Platelet Count    211 K/uL 


 


Mean Platelet Volume    10.6 fL 


 


Sodium Level    141 mmol/L 


 


Potassium Level    4.1 mmol/L 


 


Chloride Level    97 mmol/L 


 


Carbon Dioxide Level    36 mmol/L 


 


Anion Gap    8.0 mmol/L 


 


Blood Urea Nitrogen    53 mg/dl 


 


Creatinine    1.60 mg/dl 


 


Est Creatinine Clear Calc


Drug Dose 


  


  


  35.5 ml/min 


 


 


Estimated GFR (


American) 


  


  


  36.4 


 


 


Estimated GFR (Non-


American 


  


  


  31.4 


 


 


BUN/Creatinine Ratio    33.3 


 


Random Glucose    94 mg/dl 


 


Calcium Level    8.6 mg/dl 














Test


  5/31/17


08:00 


  


  


 


 


Bedside Glucose 91 mg/dl

## 2017-05-31 NOTE — PHARMACY PROGRESS NOTE
Glycemic Control:  Progress Nt


Date of Service


May 31, 2017.





Scope


Glycemic Pharmacist consulted by Dr Skinner on 5/25/17 for glycemic control 

and to write orders per [facility] inpatient glycemic control protocol.





Objective


Accuchecks BSG (last 24hrs):











Test


  5/30/17


11:34 5/30/17


16:43 5/30/17


20:35 5/31/17


06:30


 


Bedside Glucose


  193 mg/dl


(70-90) 70 mg/dl


(70-90) 72 mg/dl


(70-90) 


 


 


Random Glucose


  


  


  


  94 mg/dl


(70-99)














Test


  5/31/17


08:00 


  


  


 


 


Bedside Glucose


  91 mg/dl


(70-90) 


  


  


 








Laboratory Data (last 24hrs)











Test


  5/31/17


06:30


 


Anion Gap 8.0 mmol/L 


 


BUN/Creatinine Ratio 33.3 


 


Blood Urea Nitrogen 53 mg/dl 


 


Creatinine 1.60 mg/dl 


 


Potassium Level 4.1 mmol/L 


 


Sodium Level 141 mmol/L 


 


White Blood Count 11.17 K/uL 








HbA1c:





 








Test


  5/26/17


06:20


 


Hemoglobin A1c


  6.6 %


(4.5-5.6)  H











Recent Pertinent Medications


Outpatient Anti-diabetic Regimen:


* Glipizide 5mg PO BIDM 





The patient is currently receiving:


* Basal insulin:             Lantus 30 units SQ Q24hrs - given in the morning





* Correctional Insulin:   Novolog Correction per scale ACHS


                          Goal Range: Low 110 mg/dL - High 140 mg/dL


                          Correction Factor: 20 mg/dL/unit





* Prandial insulin:         Per carb ratio of 1 unit per 6 grams CHO consumed








* Oral Agents:           On hold for admission 





Risk Factors for Insulin Resistance:


* Steroids - Solu Medrol 40 mg IV q8h --> 40 mg IV q12h on 5/30


* Diet





Assessment & Plan


ASSESSMENT:


Initial:


* Pt known to pharmacy from previous admissions/glycemic consults


 * Patient was admitted to [facility] in January of this year. She was on the same 

dose of steroids (40 mg IV q8) and required 26 units of Lantus BID plus 11-32 

units of bolus insulin per day.


 * Outpatient glipizide on hold for admission and pt is being treated wit SQ 

basal bolus insulin regimen for steroid induced hyperglycemia


5/30/17:


* Pt continues on high dose RTC steroids - Solumedrol 40mg IV Q8hrs -> taper to 

q12h today


* Titrating dosing daily based on BSG trends and steroid dosing


* Pt required 61-66 units per day over the past 48 hours. BSGs ranging 93 - 161 

mg/dl over the past 24hrs


 * Fasting BSG within goal. Will change to Lantus per scale starting with am 

dose on 5/31 incase steroid taper warrants change to basal requirements.


* I anticipate correctional/prandial needs to start decreasing due to tapering 

of steroids


 * will loosen CF and CR parameters starting with dinner


5/31/17:


* Adequate glycemic control over the past 24 hours


 * BSGs ranged from 70 to 193 mg/dL


 * Patient received 59 units of insulin


* Risk factors for insulin resistance remain unchanged today


 * Solu medrol IV continued at 40 mg IV BID


* Continue to titrate insulin regimen as steroids are decreased


 * Fasting BSG at goal but I anticipate it will further decrease tomorrow


 * Will loosen bolus insulin parameters due to BSG significantly less than 100 

mg/dL x 2 checks yesterday


 * Decrease basal and bolus insulin based off of anticipated need of TDD of 50 

units


 


PLAN FOR INPATIENT GLYCEMIC CONTROL:  


* Hold outpatient oral diabetes medications





* Basal insulin 


 * Lantus 20-25 units SQ in AM 


 * 20 units for BSG less than 140 md/dL


 * 25 units for  mg/dL or greater





* Bolus Insulin 


 * NovoLog ACHS 


 * Goal Range:  Low 110 mg/dL - High 140 mg/dL


 * Correction Factor: 30 mg/dL/unit


 * Nutritional / Prandial insulin per carb ratio of 1 unit per 10 grams CHO 

consumed





* Taper insulin regimen with each step down in steroid dosing. 





* Please note that the plan above was derived based on current level of insulin 

resistance and hospital stress. These recommendations are appropriate for 

inpatient admission only. Plan of care upon discharge will need to be 

reassessed to avoid potential outpatient hypo/hyperglycemia. 








Thank you.





DISCHARGE RECOMMENDATIONS:


* Pt is maintained on glipizide as an outpatient with adequate control per 

recent A1c. Insulin most likely not needed at discharge but pt may benefit from 

once daily NPH if high dose prednisone taper is continued at discharge.

## 2017-06-01 VITALS — OXYGEN SATURATION: 94 % | HEART RATE: 75 BPM

## 2017-06-01 VITALS — DIASTOLIC BLOOD PRESSURE: 81 MMHG | SYSTOLIC BLOOD PRESSURE: 153 MMHG | HEART RATE: 71 BPM

## 2017-06-01 VITALS
TEMPERATURE: 98.42 F | SYSTOLIC BLOOD PRESSURE: 143 MMHG | OXYGEN SATURATION: 94 % | DIASTOLIC BLOOD PRESSURE: 83 MMHG | HEART RATE: 77 BPM

## 2017-06-01 VITALS — SYSTOLIC BLOOD PRESSURE: 159 MMHG | DIASTOLIC BLOOD PRESSURE: 90 MMHG | HEART RATE: 63 BPM

## 2017-06-01 VITALS — HEART RATE: 66 BPM | DIASTOLIC BLOOD PRESSURE: 77 MMHG | SYSTOLIC BLOOD PRESSURE: 155 MMHG

## 2017-06-01 VITALS
TEMPERATURE: 98.06 F | SYSTOLIC BLOOD PRESSURE: 163 MMHG | OXYGEN SATURATION: 98 % | DIASTOLIC BLOOD PRESSURE: 82 MMHG | HEART RATE: 83 BPM

## 2017-06-01 VITALS — OXYGEN SATURATION: 94 % | HEART RATE: 72 BPM

## 2017-06-01 VITALS — HEART RATE: 66 BPM | OXYGEN SATURATION: 96 %

## 2017-06-01 RX ADMIN — LEVALBUTEROL SCH MG: 1.25 SOLUTION, CONCENTRATE RESPIRATORY (INHALATION) at 19:29

## 2017-06-01 RX ADMIN — ROFLUMILAST SCH MCG: 500 TABLET ORAL at 09:06

## 2017-06-01 RX ADMIN — IPRATROPIUM BROMIDE SCH MG: 0.5 SOLUTION RESPIRATORY (INHALATION) at 14:23

## 2017-06-01 RX ADMIN — NYSTATIN PRN APPLN: 100000 POWDER TOPICAL at 22:31

## 2017-06-01 RX ADMIN — FERROUS SULFATE TAB EC 325 MG (65 MG FE EQUIVALENT) SCH MG: 325 (65 FE) TABLET DELAYED RESPONSE at 09:07

## 2017-06-01 RX ADMIN — BUDESONIDE AND FORMOTEROL FUMARATE DIHYDRATE SCH PUFFS: 160; 4.5 AEROSOL RESPIRATORY (INHALATION) at 09:04

## 2017-06-01 RX ADMIN — HEPARIN SODIUM SCH UNIT: 10000 INJECTION, SOLUTION INTRAVENOUS; SUBCUTANEOUS at 09:17

## 2017-06-01 RX ADMIN — PRAVASTATIN SODIUM SCH MG: 10 TABLET ORAL at 09:06

## 2017-06-01 RX ADMIN — ROPINIROLE HYDROCHLORIDE SCH MG: 1 TABLET, FILM COATED ORAL at 20:31

## 2017-06-01 RX ADMIN — INSULIN ASPART SCH UNITS: 100 INJECTION, SOLUTION INTRAVENOUS; SUBCUTANEOUS at 12:39

## 2017-06-01 RX ADMIN — INSULIN ASPART SCH UNITS: 100 INJECTION, SOLUTION INTRAVENOUS; SUBCUTANEOUS at 20:38

## 2017-06-01 RX ADMIN — BUDESONIDE AND FORMOTEROL FUMARATE DIHYDRATE SCH PUFFS: 160; 4.5 AEROSOL RESPIRATORY (INHALATION) at 20:28

## 2017-06-01 RX ADMIN — LEVALBUTEROL SCH MG: 1.25 SOLUTION, CONCENTRATE RESPIRATORY (INHALATION) at 01:08

## 2017-06-01 RX ADMIN — HYDRALAZINE HYDROCHLORIDE SCH MG: 10 TABLET ORAL at 20:29

## 2017-06-01 RX ADMIN — FLUOXETINE SCH MG: 20 CAPSULE ORAL at 09:06

## 2017-06-01 RX ADMIN — HEPARIN SODIUM SCH UNIT: 10000 INJECTION, SOLUTION INTRAVENOUS; SUBCUTANEOUS at 20:36

## 2017-06-01 RX ADMIN — ROPINIROLE HYDROCHLORIDE SCH MG: 1 TABLET, FILM COATED ORAL at 09:07

## 2017-06-01 RX ADMIN — HYDRALAZINE HYDROCHLORIDE SCH MG: 10 TABLET ORAL at 09:07

## 2017-06-01 RX ADMIN — IPRATROPIUM BROMIDE SCH MG: 0.5 SOLUTION RESPIRATORY (INHALATION) at 19:29

## 2017-06-01 RX ADMIN — TOLTERODINE TARTRATE SCH MG: 2 TABLET, FILM COATED ORAL at 20:31

## 2017-06-01 RX ADMIN — HYDRALAZINE HYDROCHLORIDE SCH MG: 10 TABLET ORAL at 13:30

## 2017-06-01 RX ADMIN — IPRATROPIUM BROMIDE SCH MG: 0.5 SOLUTION RESPIRATORY (INHALATION) at 01:08

## 2017-06-01 RX ADMIN — SPIRONOLACTONE SCH MG: 25 TABLET, FILM COATED ORAL at 09:06

## 2017-06-01 RX ADMIN — LEVALBUTEROL SCH MG: 1.25 SOLUTION, CONCENTRATE RESPIRATORY (INHALATION) at 14:23

## 2017-06-01 RX ADMIN — NYSTATIN PRN APPLN: 100000 POWDER TOPICAL at 12:42

## 2017-06-01 RX ADMIN — Medication SCH MCG: at 09:07

## 2017-06-01 RX ADMIN — TOLTERODINE TARTRATE SCH MG: 2 TABLET, FILM COATED ORAL at 09:07

## 2017-06-01 RX ADMIN — IPRATROPIUM BROMIDE SCH MG: 0.5 SOLUTION RESPIRATORY (INHALATION) at 06:55

## 2017-06-01 RX ADMIN — INSULIN ASPART SCH UNITS: 100 INJECTION, SOLUTION INTRAVENOUS; SUBCUTANEOUS at 18:10

## 2017-06-01 RX ADMIN — LEVALBUTEROL SCH MG: 1.25 SOLUTION, CONCENTRATE RESPIRATORY (INHALATION) at 06:55

## 2017-06-01 RX ADMIN — METOPROLOL SUCCINATE SCH MG: 50 TABLET, EXTENDED RELEASE ORAL at 09:05

## 2017-06-01 RX ADMIN — ROPINIROLE HYDROCHLORIDE SCH MG: 1 TABLET, FILM COATED ORAL at 13:30

## 2017-06-01 RX ADMIN — LISINOPRIL SCH MG: 10 TABLET ORAL at 09:05

## 2017-06-01 RX ADMIN — FUROSEMIDE SCH MG: 20 TABLET ORAL at 09:05

## 2017-06-01 RX ADMIN — INSULIN ASPART SCH UNITS: 100 INJECTION, SOLUTION INTRAVENOUS; SUBCUTANEOUS at 09:14

## 2017-06-01 NOTE — PULMONOLOGY PROGRESS NOTE
Pulmonary Progress Note


Date of Service


Jun 1, 2017.





Attending








Subjective


no new complaints


says she will be discharged to rehab center





Objective


General Appearance:  no distress, obese


Eyes:  PERRLA


ENT:  NORMAL THROAT EXAM


Neck:  NO TENDERNESS, TRACHEA MIDLINE


Respiratory:  clear, good air entry b/l


Cardiovasular:  REGULAR RATE/RHYTHM, NORMAL S1S2


Abdomen:  NON TENDER, NO REBOUND, NO GUARDING


Upper Extremities:  NO EDEMA


Lower Extremities:  edema (bilateral) improving


Neuro:  ALERT, ORIENTED x 4





Assessment & Plan


72 year old female with advanced emphysema, presents with an exacerbation 

episode.





Plan: 


d/c iv solumedrol


start po prednisone 50 mg  day , taper by 10 mg QOD until d/c


continue neb rx q 6 hrs


add spiriva or tudorza prior to discharge


Her weight is also contributing to her shortness of breath


advise weight loss


continue daliresp : daliresp also can cause weight loss which might benefit the 

patient


needs pul rehab as outpatient


continue o2 3L at rest and 5L on exertion ( o2 sats drop to 78% on exertion on 

3L )


evaluate for trilogy before discharge


continue pt/ot


f/u with pulmonary as outpatient


nothing much to add at this time


will sign off





Data


Medications:





Current Inpatient Medications








 Medications


  (Trade)  Dose


 Ordered  Sig/Roya


 Route  Start Time


 Stop Time Status Last Admin


Dose Admin


 


 Acetaminophen


  (Tylenol Tab)  650 mg  Q4H  PRN


 PO  5/25/17 13:45


 6/24/17 13:44  5/25/17 23:53


650 MG


 


 Polyethylene


  (Miralax Powder


 Packet)  17 gm  DAILY  PRN


 PO  5/25/17 14:00


 6/24/17 13:59   


 


 


 Ondansetron HCl


  (Zofran Inj)  4 mg  Q6H  PRN


 IV  5/25/17 13:45


 6/24/17 13:44   


 


 


 Cyanocobalamin


  (Vitamin B-12


 Tab)  1,000 mcg  DAILY


 PO  5/26/17 08:00


 6/25/17 07:59  6/1/17 09:07


1,000 MCG


 


 Fluoxetine HCl


  (Prozac Cap)  20 mg  QAM


 PO  5/26/17 08:00


 6/25/17 07:59  6/1/17 09:06


20 MG


 


 Furosemide


  (Lasix Tab)  20 mg  DAILY


 PO  5/26/17 08:00


 6/25/17 07:59 Future hold 6/1/17 09:05


20 MG


 


 Lisinopril


  (Zestril Tab)  10 mg  DAILY


 PO  5/26/17 08:00


 6/25/17 07:59 Future hold 6/1/17 09:05


10 MG


 


 Metoprolol


 Succinate


  (Toprol Xl Tab)  100 mg  DAILY


 PO  5/26/17 08:00


 6/25/17 07:59  6/1/17 09:05


100 MG


 


 Pravastatin Sodium


  (Pravachol Tab)  10 mg  DAILY


 PO  5/26/17 08:00


 6/25/17 07:59  6/1/17 09:06


10 MG


 


 Ropinirole HCl


  (Requip Tab)  2 mg  TID


 PO  5/25/17 14:00


 6/24/17 13:59  6/1/17 09:07


2 MG


 


 Spironolactone


  (Aldactone Tab)  12.5 mg  QAM


 PO  5/26/17 08:00


 6/25/17 07:59 Future hold 6/1/17 09:06


12.5 MG


 


 Tramadol HCl


  (Ultram Tab)  50 mg  Q6  PRN


 PO  5/25/17 13:45


 6/24/17 13:44   


 


 


 Ferrous Sulfate


  (Feosol Tab)  325 mg  DAILY


 PO  5/26/17 08:00


 6/25/17 07:59  6/1/17 09:07


325 MG


 


 Glucose


  (Glucose 40% Gel)  15-30


 GRAMS 15


 GRAMS...  UD  PRN


 PO  5/25/17 13:45


 6/24/17 13:44   


 


 


 Glucose


  (Glucose Chew


 Tab)  4-8


 Tablets 4


 Tabl...  UD  PRN


 PO  5/25/17 13:45


 6/24/17 13:44   


 


 


 Dextrose


  (Dextrose 50%


 50ML Syringe)  25-50ML OF


 50% DW IV


 FOR...  UD  PRN


 IV  5/25/17 13:45


 6/24/17 13:44   


 


 


 Glucagon


  (Glucagon Inj)  1 mg  UD  PRN


 SQ  5/25/17 13:45


 6/24/17 13:44   


 


 


 Miscellaneous


 Information


  (Consult


 Glycemic


 Management


 Pharmacy)  1 ea  UD  PRN


 N/A  5/25/17 14:00


 6/24/17 13:59   


 


 


 Guaifenesin


  (Organidin Nr


 Tab)  200 mg  Q4H  PRN


 PO  5/25/17 15:30


 6/24/17 15:29   


 


 


 Ipratropium


 Bromide


  (Atrovent 0.02%


 0.5MG/2.5ML Neb)  0.5 mg  Q6R


 INH  5/25/17 21:00


 6/24/17 20:59  6/1/17 06:55


0.5 MG


 


 Levalbuterol


  (Xopenex 1.25MG/


 0.5ML Neb)  1.25 mg  Q6R


 INH  5/25/17 21:00


 6/24/17 20:59  6/1/17 06:55


1.25 MG


 


 Hydralazine HCl


  (Apresoline Tab)  10 mg  TID


 PO  5/27/17 08:00


 6/26/17 07:59  6/1/17 09:07


10 MG


 


 Heparin Sodium


  (Porcine)


  (Heparin Sq 5000


 Unit/0.5ml)  5,000 unit  Q12


 SQ  5/27/17 09:00


 6/26/17 08:59  6/1/17 09:17


5,000 UNIT


 


 Hydralazine HCl


  (HydrALAZINE INJ)  20 mg  Q6H  PRN


 IV.  5/27/17 14:30


 6/26/17 14:29   


 


 


 Tolterodine


 Tartrate


  (Detrol Tab)  2 mg  BID


 PO  5/28/17 20:00


 6/27/17 19:59  6/1/17 09:07


2 MG


 


 Budesonide/


 Formoterol


 Fumarate


  (Symbicort 160/


 4.5 Inh)  2 puffs  BID


 INH  5/28/17 20:00


 6/27/17 19:59  6/1/17 09:04


2 PUFFS


 


 Roflumilast


  (Daliresp Tab)  500 mcg  DAILY


 PO  5/29/17 08:00


 6/28/17 07:59  6/1/17 09:06


500 MCG


 


 Insulin Aspart


  (novoLOG ASPART)  **SLIDING


 SCALE**


 **If C...  ACHS


 SC  5/30/17 21:00


 6/29/17 20:59  5/31/17 20:55


4 UNITS


 


 Nystatin


  (Mycostatin


 Powder)  1 appln  PRN  PRN


 EXT  5/30/17 16:30


 6/29/17 16:29  5/31/17 16:41


1 APPLN


 


 Prednisone


  (PredniSONE TAB)  50 mg


 Taper  DAILY


 PO  6/1/17 08:00


 6/11/17 07:59  6/1/17 09:08


50 MG








Vital Signs:











  Date Time  Temp Pulse Resp B/P (MAP) Pulse Ox O2 Delivery O2 Flow Rate FiO2


 


6/1/17 07:56 36.7 83 20 163/82 (109) 98 Nasal Cannula 4.0 


 


6/1/17 07:41      Nasal Cannula 3.0 


 


6/1/17 06:55  75 16  94 Nasal Cannula 3.0 


 


6/1/17 01:09  66 16  96 Nasal Cannula 3.0 


 


6/1/17 00:00      Nasal Cannula 3.0 


 


5/31/17 23:57 36.8 66 20 138/76 (96) 99 Room Air  


 


5/31/17 19:41  73  146/83 (104)    


 


5/31/17 19:08  75 16  97 Nasal Cannula 3.0 


 


5/31/17 16:57      Room Air  


 


5/31/17 14:43 36.7 69 22 151/81 (104) 93 Nasal Cannula 4.0 


 


5/31/17 14:10  71 16  96 Nasal Cannula 3.0 


 


5/31/17 13:51  69  153/81 (105)    


 


5/31/17 10:29     85   








Laboratory Results:





Last 24 Hours








Test


  5/31/17


11:20 5/31/17


17:16 5/31/17


20:18 6/1/17


07:41


 


Bedside Glucose 134 mg/dl  162 mg/dl  243 mg/dl  46 mg/dl 


 


Test


  6/1/17


08:00 


  


  


 


 


Bedside Glucose 73 mg/dl

## 2017-06-01 NOTE — PALLIATIVE CARE CONSULTATION
Consultation


Date of Consultation:


Jun 1, 2017.


Requesting Physician:


Dr. Casey


Attending Physician:


Dr. Casey


Reason for Consultation:


Goals of care


History of Present Illness


This 72 year old female patient presented to the hospital as a direct admission 

from [facility], office for COPD exacerbation. Other PMH listed below. 

Patient was last in the hospital in January with COPD, influenza and pneumonia. 

She went to [facility] for rehab after that admission, then home with home 

health and eventually was discharged and was home independently. Here, CXR 

showed increased bibasilar airspace opacities representing atelectasis vs. 

infectious/inflammatory pneumonitis. She was started on IV solu-medrol and 

nebulizers. She is being followed by pulmonology, is now switched to oral 

prednisone, and is continuing her home meds. Pulm suggests starting patient on 

Tudorza or Spiriva prior to discharge as well. Patient was talking about 

nursing homes that had end-of-life care and is beginning to think about her 

advance care planning. Palliative care consulted to assist in establishing 

goals of care talk about options for end of life care when the time comes. 





I met with patient in room 420. She is awake, alert and oriented x4. Pleasant 

and talkative, mildly short of breath at rest but she states this is her 

baseline. She denies any pain or discomfort. Regarding her COPD, patient stated

, "I know my lungs are going to get any better. I just want to maintain the 

function I have." It's important to the patient to remain independent for as 

long as she can and to spend time with her family. At this time, she lives 

alone in a 2-story condo. She has some concerns about that, which is why she 

was wanting to go to SNF. She performed too well with physical therapy to 

qualify for a SNF for rehab. We discussed home health options and an eventual 

transition to hospice. I reiterated that I was not telling her she was hospice-

appropriate at this time, I just wanted to explain the service so she knows 

options. She verbalized understanding. At this time, she [facility] (as she's [facility] in the past). We discussed ways 

to make living easier at home, such as moving potty chair to 1st floor she she 

doesn't have to go up and down stairs. Patient does have a living will/advance  

directive and would want to be made comfortable when she is end-stage. I did 

not specifically talk about code status with her. Tomorrow I will go over a 

POLST form and offer completing it with her.





Past Medical/Surgical History


Medical History:


CHF


COPD


DM type 2


HLD


Htn





Social History


Smoking Status:  Former Smoker (quit in 2003, almost 1 ppd for most of adult 

life)


History of Alcohol Use:  Yes


Drug Use:  none


Marital Status:  single, 


Housing Status:  lives alone


Occupation Status:  retired





Review of Systems


Constitutional:  No weakness


Respiratory:  + cough, + shortness of breath, + dyspnea on exertion


Cardiac:  + edema, No chest pain


Abdomen:  No pain, No nausea, No vomiting


Female :  No problem reported


Psychiatric:  No depression symptoms, No anxiety





Allergies


Coded Allergies:  


     Atropine (Verified  Allergy, Severe, RASH, 1/14/17)


     Dobutamine (Verified  Allergy, Severe, RASH, 1/14/17)





Medications





Current Inpatient Medications








 Medications


  (Trade)  Dose


 Ordered  Sig/Roya


 Route  Start Time


 Stop Time Status Last Admin


Dose Admin


 


 Acetaminophen


  (Tylenol Tab)  650 mg  Q4H  PRN


 PO  5/25/17 13:45


 6/24/17 13:44  5/25/17 23:53


650 MG


 


 Polyethylene


  (Miralax Powder


 Packet)  17 gm  DAILY  PRN


 PO  5/25/17 14:00


 6/24/17 13:59   


 


 


 Ondansetron HCl


  (Zofran Inj)  4 mg  Q6H  PRN


 IV  5/25/17 13:45


 6/24/17 13:44   


 


 


 Cyanocobalamin


  (Vitamin B-12


 Tab)  1,000 mcg  DAILY


 PO  5/26/17 08:00


 6/25/17 07:59  6/1/17 09:07


1,000 MCG


 


 Fluoxetine HCl


  (Prozac Cap)  20 mg  QAM


 PO  5/26/17 08:00


 6/25/17 07:59  6/1/17 09:06


20 MG


 


 Furosemide


  (Lasix Tab)  20 mg  DAILY


 PO  5/26/17 08:00


 6/25/17 07:59 Future hold 6/1/17 09:05


20 MG


 


 Lisinopril


  (Zestril Tab)  10 mg  DAILY


 PO  5/26/17 08:00


 6/25/17 07:59 Future hold 6/1/17 09:05


10 MG


 


 Metoprolol


 Succinate


  (Toprol Xl Tab)  100 mg  DAILY


 PO  5/26/17 08:00


 6/25/17 07:59  6/1/17 09:05


100 MG


 


 Pravastatin Sodium


  (Pravachol Tab)  10 mg  DAILY


 PO  5/26/17 08:00


 6/25/17 07:59  6/1/17 09:06


10 MG


 


 Ropinirole HCl


  (Requip Tab)  2 mg  TID


 PO  5/25/17 14:00


 6/24/17 13:59  6/1/17 13:30


2 MG


 


 Spironolactone


  (Aldactone Tab)  12.5 mg  QAM


 PO  5/26/17 08:00


 6/25/17 07:59 Future hold 6/1/17 09:06


12.5 MG


 


 Tramadol HCl


  (Ultram Tab)  50 mg  Q6  PRN


 PO  5/25/17 13:45


 6/24/17 13:44   


 


 


 Ferrous Sulfate


  (Feosol Tab)  325 mg  DAILY


 PO  5/26/17 08:00


 6/25/17 07:59  6/1/17 09:07


325 MG


 


 Glucose


  (Glucose 40% Gel)  15-30


 GRAMS 15


 GRAMS...  UD  PRN


 PO  5/25/17 13:45


 6/24/17 13:44   


 


 


 Glucose


  (Glucose Chew


 Tab)  4-8


 Tablets 4


 Tabl...  UD  PRN


 PO  5/25/17 13:45


 6/24/17 13:44   


 


 


 Dextrose


  (Dextrose 50%


 50ML Syringe)  25-50ML OF


 50% DW IV


 FOR...  UD  PRN


 IV  5/25/17 13:45


 6/24/17 13:44   


 


 


 Glucagon


  (Glucagon Inj)  1 mg  UD  PRN


 SQ  5/25/17 13:45


 6/24/17 13:44   


 


 


 Guaifenesin


  (Organidin Nr


 Tab)  200 mg  Q4H  PRN


 PO  5/25/17 15:30


 6/24/17 15:29   


 


 


 Ipratropium


 Bromide


  (Atrovent 0.02%


 0.5MG/2.5ML Neb)  0.5 mg  Q6R


 INH  5/25/17 21:00


 6/24/17 20:59  6/1/17 14:23


0.5 MG


 


 Levalbuterol


  (Xopenex 1.25MG/


 0.5ML Neb)  1.25 mg  Q6R


 INH  5/25/17 21:00


 6/24/17 20:59  6/1/17 14:23


1.25 MG


 


 Hydralazine HCl


  (Apresoline Tab)  10 mg  TID


 PO  5/27/17 08:00


 6/26/17 07:59  6/1/17 13:30


10 MG


 


 Heparin Sodium


  (Porcine)


  (Heparin Sq 5000


 Unit/0.5ml)  5,000 unit  Q12


 SQ  5/27/17 09:00


 6/26/17 08:59  6/1/17 09:17


5,000 UNIT


 


 Hydralazine HCl


  (HydrALAZINE INJ)  20 mg  Q6H  PRN


 IV.  5/27/17 14:30


 6/26/17 14:29   


 


 


 Tolterodine


 Tartrate


  (Detrol Tab)  2 mg  BID


 PO  5/28/17 20:00


 6/27/17 19:59  6/1/17 09:07


2 MG


 


 Budesonide/


 Formoterol


 Fumarate


  (Symbicort 160/


 4.5 Inh)  2 puffs  BID


 INH  5/28/17 20:00


 6/27/17 19:59  6/1/17 09:04


2 PUFFS


 


 Roflumilast


  (Daliresp Tab)  500 mcg  DAILY


 PO  5/29/17 08:00


 6/28/17 07:59  6/1/17 09:06


500 MCG


 


 Insulin Aspart


  (novoLOG ASPART)  **SLIDING


 SCALE**


 **If C...  ACHS


 SC  5/30/17 21:00


 6/29/17 20:59  6/1/17 12:39


7 UNITS


 


 Nystatin


  (Mycostatin


 Powder)  1 appln  PRN  PRN


 EXT  5/30/17 16:30


 6/29/17 16:29  6/1/17 12:42


1 APPLN


 


 Prednisone


  (PredniSONE TAB)  50 mg


 Taper  DAILY


 PO  6/1/17 08:00


 6/11/17 07:59  6/1/17 09:08


50 MG











Physical Exam











  Date Time  Temp Pulse Resp B/P (MAP) Pulse Ox O2 Delivery O2 Flow Rate FiO2


 


6/1/17 14:23  72 16  94 Nasal Cannula 3.0 


 


6/1/17 13:28  71  153/81 (105)    


 


6/1/17 11:54  66  155/77 (103)    


 


6/1/17 07:56 36.7 83 20 163/82 (109) 98 Nasal Cannula 4.0 


 


6/1/17 07:41      Nasal Cannula 3.0 


 


6/1/17 06:55  75 16  94 Nasal Cannula 3.0 


 


6/1/17 01:09  66 16  96 Nasal Cannula 3.0 


 


6/1/17 00:00      Nasal Cannula 3.0 


 


5/31/17 23:57 36.8 66 20 138/76 (96) 99 Room Air  


 


5/31/17 19:41  73  146/83 (104)    


 


5/31/17 19:08  75 16  97 Nasal Cannula 3.0 


 


5/31/17 16:57      Room Air  


 


5/31/17 14:43 36.7 69 22 151/81 (104) 93 Nasal Cannula 4.0 








General Appearance:  no apparent distress, + obese, + pertinent finding (

chronically ill appearing)


ENT:  hearing grossly normal


Neck:  supple, thyroid normal


Respiratory:  no respiratory distress, no accessory muscle use, + decreased 

breath sounds (bilateral bases)


Cardiovascular:  regular rate, rhythm, + normal peripheral pulses, + pertinent 

finding (+2 pitting edema to bilatral ankles)


Abdomen:  normal bowel sounds, non tender, soft


Neurologic/Psychiatric:  alert, normal mood/affect, oriented x 3





Laboratory Results





Last 24 Hours








Test


  5/31/17


17:16 5/31/17


20:18 6/1/17


07:41 6/1/17


08:00


 


Bedside Glucose 162 mg/dl  243 mg/dl  46 mg/dl  73 mg/dl 


 


Test


  6/1/17


11:31 


  


  


 


 


Bedside Glucose 209 mg/dl    











Assessment & Plan


Palliative Performance Scale:  70 %





Problem list:


SOB


Activity intolerance


COPD exacerbation, approaching end-stage disease


Thrush


CHF


Goals of care (Z51.5)





Palliative care plan:


-Patient will go home with home health. [facility] also [facility] 

the [facility] program as well, so an easy transition can be 

made in the future.


-Patient is looking into facilities including assisted living. Her budget is 

limited which concerns her.  will continue this conversation with 

hr.


-Patient has living will and wants to be comfortable at the end of life. She is 

currently a full resuscitation, I will discuss this with her tomorrow to be 

sure this coincides with her goals. I will also offer a POLST form.


-To go to pulmonary rehab after discharge. Pulmonary following to be sure she 

has optimal medical management of COPD.


-Patient states she is going to be on daily dose of prednisone. She is 

concerned about side effects but also feels better from a respiratory 

standpoint when she is on prednisone. She is hoping this will keep 

exacerbations at bay.





Thank you kindly for this consult. I will follow as needed.

## 2017-06-01 NOTE — PROGRESS NOTE
Subjective


Date of Service:


Jun 1, 2017.


Subjective


this pt remains short of breath, she did however do well at PT, she did also 

ask about end of life care information and agreed to speak to palliative care 

nurse 





cough and marked OMALLEY





Problem List


Medical Problems:


(1) Cellulitis


Status: Acute  





(2) COPD exacerbation


Status: Acute  





(3) COPD exacerbation


Status: Acute  





(4) Hypoxia


Status: Acute  





(5) SOB (shortness of breath)


Status: Acute  











Review of Systems


Constitutional:  + weakness, + fatigue, No fever, No chills


Respiratory:  + cough, + sputum, + shortness of breath, + dyspnea on exertion


Cardiac:  No chest pain, No orthopnea, No PND


Abdomen:  No pain, No nausea, No vomiting, No diarrhea


Musculoskeletal:  No joint pain, No muscle pain, No swelling


Female :  No dysuria, No urinary frequency


Psychiatric:  No depression symptoms, No anhedonism, No anxiety





Objective


Vital Signs











  Date Time  Temp Pulse Resp B/P (MAP) Pulse Ox O2 Delivery O2 Flow Rate FiO2


 


6/1/17 11:54  66  155/77 (103)    


 


6/1/17 07:56 36.7 83 20 163/82 (109) 98 Nasal Cannula 4.0 


 


6/1/17 07:41      Nasal Cannula 3.0 


 


6/1/17 06:55  75 16  94 Nasal Cannula 3.0 


 


6/1/17 01:09  66 16  96 Nasal Cannula 3.0 


 


6/1/17 00:00      Nasal Cannula 3.0 


 


5/31/17 23:57 36.8 66 20 138/76 (96) 99 Room Air  


 


5/31/17 19:41  73  146/83 (104)    


 


5/31/17 19:08  75 16  97 Nasal Cannula 3.0 


 


5/31/17 16:57      Room Air  


 


5/31/17 14:43 36.7 69 22 151/81 (104) 93 Nasal Cannula 4.0 


 


5/31/17 14:10  71 16  96 Nasal Cannula 3.0 


 


5/31/17 13:51  69  153/81 (105)    











Physical Exam


General Appearance:  WD/WN, + mild distress


Eyes:  PERRL, EOMI


Neck:  supple, no JVD, trachea midline


Respiratory/Chest:  + respiratory distress, + decreased breath sounds, + 

accessory muscle use


Cardiovascular:  regular rate, rhythm, no murmur


Abdomen:  normal bowel sounds, non tender, soft


Extremities:  no pedal edema, no calf tenderness


Neurologic/Psychiatric:  alert, oriented x 3





Laboratory Results





Last 24 Hours








Test


  5/31/17


17:16 5/31/17


20:18 6/1/17


07:41 6/1/17


08:00


 


Bedside Glucose 162 mg/dl  243 mg/dl  46 mg/dl  73 mg/dl 


 


Test


  6/1/17


11:31 


  


  


 


 


Bedside Glucose 209 mg/dl    











Assessment and Plan


71 y/o female with admitted on May 25 2017 because of COPD exacerbation, 





COPD exacerbation: very hypoxic with exertion, however did well walking at PT 6/ 1, will attempt to arrange outpt pulm rehab


Chronic respiratory failure with hypoxia and home O2 dependent


 Solu-Medrol now transitioned to po prednisone nebulizer treatment,  Symbicort (

she takes Qvar as outpatient), added Daliresp,


Per pulmonologist start Tudorza when she is closing in her discharge(or spiriva)

, consider trilogy at discharge per pulmonary recommendations





chronic diastolic heart failure, her dyspnea seems pulmonary





acute on chronic renal failure stage III, resolved





oral thrush: improved with nystatin mouthwash 





Urinary incontinence--> urge incontinence- tolteridine 2 mg BID ordered 

improved will follow up as outpt





Hospice care eval for home support


Continued [facility] stay due to:  multiple IV medications needed


Discharge planning:  home

## 2017-06-02 VITALS
SYSTOLIC BLOOD PRESSURE: 139 MMHG | OXYGEN SATURATION: 95 % | TEMPERATURE: 97.88 F | DIASTOLIC BLOOD PRESSURE: 79 MMHG | HEART RATE: 65 BPM

## 2017-06-02 VITALS
OXYGEN SATURATION: 100 % | TEMPERATURE: 97.7 F | HEART RATE: 61 BPM | SYSTOLIC BLOOD PRESSURE: 168 MMHG | DIASTOLIC BLOOD PRESSURE: 84 MMHG

## 2017-06-02 VITALS
OXYGEN SATURATION: 100 % | HEART RATE: 61 BPM | DIASTOLIC BLOOD PRESSURE: 92 MMHG | SYSTOLIC BLOOD PRESSURE: 168 MMHG | TEMPERATURE: 97.7 F

## 2017-06-02 VITALS — OXYGEN SATURATION: 85 %

## 2017-06-02 VITALS — OXYGEN SATURATION: 96 % | HEART RATE: 56 BPM

## 2017-06-02 RX ADMIN — ROPINIROLE HYDROCHLORIDE SCH MG: 1 TABLET, FILM COATED ORAL at 08:22

## 2017-06-02 RX ADMIN — IPRATROPIUM BROMIDE SCH MG: 0.5 SOLUTION RESPIRATORY (INHALATION) at 07:28

## 2017-06-02 RX ADMIN — INSULIN ASPART SCH UNITS: 100 INJECTION, SOLUTION INTRAVENOUS; SUBCUTANEOUS at 08:37

## 2017-06-02 RX ADMIN — Medication SCH MCG: at 08:23

## 2017-06-02 RX ADMIN — FUROSEMIDE SCH MG: 20 TABLET ORAL at 08:24

## 2017-06-02 RX ADMIN — ROFLUMILAST SCH MCG: 500 TABLET ORAL at 08:22

## 2017-06-02 RX ADMIN — ROPINIROLE HYDROCHLORIDE SCH MG: 1 TABLET, FILM COATED ORAL at 13:52

## 2017-06-02 RX ADMIN — IPRATROPIUM BROMIDE SCH MG: 0.5 SOLUTION RESPIRATORY (INHALATION) at 02:01

## 2017-06-02 RX ADMIN — TOLTERODINE TARTRATE SCH MG: 2 TABLET, FILM COATED ORAL at 08:22

## 2017-06-02 RX ADMIN — FLUOXETINE SCH MG: 20 CAPSULE ORAL at 08:24

## 2017-06-02 RX ADMIN — METOPROLOL SUCCINATE SCH MG: 50 TABLET, EXTENDED RELEASE ORAL at 08:23

## 2017-06-02 RX ADMIN — FERROUS SULFATE TAB EC 325 MG (65 MG FE EQUIVALENT) SCH MG: 325 (65 FE) TABLET DELAYED RESPONSE at 08:22

## 2017-06-02 RX ADMIN — HYDRALAZINE HYDROCHLORIDE SCH MG: 10 TABLET ORAL at 08:23

## 2017-06-02 RX ADMIN — HEPARIN SODIUM SCH UNIT: 10000 INJECTION, SOLUTION INTRAVENOUS; SUBCUTANEOUS at 08:37

## 2017-06-02 RX ADMIN — SPIRONOLACTONE SCH MG: 25 TABLET, FILM COATED ORAL at 08:25

## 2017-06-02 RX ADMIN — INSULIN ASPART SCH UNITS: 100 INJECTION, SOLUTION INTRAVENOUS; SUBCUTANEOUS at 12:53

## 2017-06-02 RX ADMIN — LISINOPRIL SCH MG: 10 TABLET ORAL at 08:24

## 2017-06-02 RX ADMIN — PRAVASTATIN SODIUM SCH MG: 10 TABLET ORAL at 08:22

## 2017-06-02 RX ADMIN — LEVALBUTEROL SCH MG: 1.25 SOLUTION, CONCENTRATE RESPIRATORY (INHALATION) at 07:27

## 2017-06-02 RX ADMIN — HYDRALAZINE HYDROCHLORIDE SCH MG: 10 TABLET ORAL at 13:52

## 2017-06-02 RX ADMIN — LEVALBUTEROL SCH MG: 1.25 SOLUTION, CONCENTRATE RESPIRATORY (INHALATION) at 02:01

## 2017-06-02 RX ADMIN — BUDESONIDE AND FORMOTEROL FUMARATE DIHYDRATE SCH PUFFS: 160; 4.5 AEROSOL RESPIRATORY (INHALATION) at 08:22

## 2017-06-02 NOTE — DISCHARGE SUMMARY
Discharge Summary


Date of Service


Jun 2, 2017.





Discharge Summary


Admission Date:


May 25, 2017 at 12:42


Discharge Date:  Jun 2, 2017


Discharge Disposition:  Home with services


Principal Diagnosis:  copd exacerbation


Immunizations:  


   Have You Had Influenza Vaccine:  Yes


   Influenza Vaccine Date:  Oct 17, 2013


   History of Tetanus Vaccine?:  Yes


   Tetanus Immunization Date:  Oct 8, 2013


   History of Pneumococcal:  Yes


   Pneumococcal Date:  Nov 17, 2010


   History of Hepatitis B Vaccine:  No





Medication Reconciliation


New Medications:  


Budesonide/Formoterol Fumarate (Symbicort 160-4.5 Mcg/Act) 60 Puffs/Inhaler Aero


2 PUFFS INH BID, #1 INHALER 2 Refills





Prednisone (Prednisone) 10 Mg Tab


10 MG PO UD, #42 TAB


4 pills a day x 4 days then


 3 pills a day x 4 days then


 2 pills a day x 4 days then


 one pill a day and as directed


Roflumilast (Daliresp) 500 Mcg Tab


500 MCG PO DAILY, #30 TAB 5 Refills





Tolterodine Tartrate (Detrol) 2 Mg Tab


2 MG PO BID, #60 TAB 6 Refills





 


Continued Medications:  


Amoxicillin (Amoxil) 500 Mg Cap


500 MG PO UD PRN for DENTAL WORK





Cyanocobalamin (Vitamin B12 500MCG) 500 Mcg Tab


1000 MCG PO DAILY





Ferrous Sulfate (Iron Supplement) 325 Mg Tab


325 MG PO DAILY





Fluoxetine Hcl (Prozac) 20 Mg Cap


20 MG PO QAM





Furosemide (Lasix) 20 Mg Tab


20 MG PO DAILY, TAB





Glipizide (Glipizide) 5 Mg Tab


5 MG PO BID





Ipratropium-Albuterol (Duoneb) 3 Ml Nebu


1 TREATMENT INH Q6HWA for 30 Days, #150 UNITS (This prescription has been 

renewed)


Use 1 neb treatment every 6 hours while awake.  May use


 every 2 hours as needed for shortness of breath & wheezing.


Lisinopril (Prinivil) 10 Mg Tab


10 MG PO DAILY





Metoprolol Succ (Toprol Xl) (Toprol-Xl ) 100 Mg Tabcr


100 MG PO DAILY





Nystatin (Nystatin) 5 Ml Susp


5 ML PO QID for 11 Days, #220 ML


Take 5 ml (1 teaspoon) by mouth four times a day.


Pravastatin Sodium (Pravastatin Sodium) 10 Mg Tab


10 MG PO DAILY





Ropinirole (Requip) 2 Mg Tab


2 MG PO TID





Spironolactone (Aldactone) 25 Mg Tab


12.5 MG PO QAM





Tramadol HCl (Tramadol HCl) 50 Mg Tab


50 MG PO Q6 PRN for Pain





 


Discontinued Medications:  


Prednisone (Prednisone) 10 Mg Tab


10 MG PO UD for 48 Days, #156 TABS


Take 6 tabs (60 mg) daily for 4 days. Then decrease dose by 5 mg


 daily for 4 days. Decrease dose by 5 mg every 4 days.








Discharge Exam


Review of Systems:  


   Constitutional:  No fever, No chills, No sweats


   Respiratory:  + shortness of breath, + dyspnea on exertion, No cough, No 

sputum


   Abdomen:  No pain, No nausea


   Genitourinary - Female:  No dysuria, No urinary frequency





Hospital Course


71 y/o female with admitted on May 25 2017 because of COPD exacerbation, 





COPD exacerbation: very hypoxic with exertion, however did well walking at PT 6/ 1, will  arrange outpt pulm rehab


Chronic respiratory failure with hypoxia and home O2 dependent


  po prednisone with taper nebulizer treatment,  Symbicort \ Gilbertoiresp,  wants 

to speak to terrie ley about TUDORZA before starting to see if samples are 

available, will be on ipratropium via neb for anticholinergic 





Discussed trilogy at discharge pt does not feel she would be comfortable using


 


chronic diastolic heart failure, her dyspnea seems pulmonary





acute on chronic renal failure stage III, resolved





oral thrush:resolved with nystatin mouthwash 





Urinary incontinence--> urge incontinence- tolteridine 2 mg BID ordered 

improved will follow up as outpt





Hospice care eval for home support, will have home health


Total Time Spent:  Greater than 30 minutes


This includes examination of the patient, discharge planning, medication 

reconciliation, and communication with other providers.





Discharge Instructions


Please refer to the electronic Patient Visit Report (Discharge Instructions) 

for additional information.

## 2017-06-02 NOTE — PALLIATIVE CARE PROGRESS NOTE
Palliative Care Progress Note


Date of Service


Jun 2, 2017.





Subjective


Pt evaluation today including:  conversation w/ patient


-Patient is feeling like herself today, still feels she is at her baseline. Is 

sitting on side of bed watching videos on her ipad.


-I went over and explained POLST form. See plan below.





Review of Systems


Constitutional:  No weakness


Respiratory:  + cough, + dyspnea on exertion, No dyspnea at rest


Cardiac:  + edema, No chest pain


Abdomen:  No pain, No nausea, No vomiting


Psychiatric:  No depression symptoms, No anxiety





Objective


Vital Signs











  Date Time  Temp Pulse Resp B/P (MAP) Pulse Ox O2 Delivery O2 Flow Rate FiO2


 


6/2/17 08:30      Nasal Cannula 3.0 


 


6/2/17 07:57 36.5 61 18 171/92 (118) 100  3.0 





    168/84 (112)    


 


6/2/17 07:28  56 16  96 Nasal Cannula 3.0 


 


6/2/17 00:04 36.6 65 18 139/79 (99) 95  2.0 


 


6/2/17 00:00      Nasal Cannula 3.0 


 


6/1/17 20:25  63  159/90 (113)    


 


6/1/17 20:00      Nasal Cannula 3.0 


 


6/1/17 15:54      Nasal Cannula 3.0 


 


6/1/17 14:35 36.9 77 18 143/83 (103) 94 Room Air  


 


6/1/17 14:23  72 16  94 Nasal Cannula 3.0 


 


6/1/17 13:28  71  153/81 (105)    


 


6/1/17 11:54  66  155/77 (103)    











Physical Exam


General Appearance:  no apparent distress, + obese, + pertinent finding (

chronically ill appearing)


ENT:  hearing grossly normal


Neck:  supple, no JVD


Respiratory/Chest:  no respiratory distress, no accessory muscle use, + 

decreased breath sounds, + pertinent finding (NC)


Cardiovascular:  regular rate, rhythm, + normal peripheral pulses, + pertinent 

finding (+2 pitting edema to bilateral lower extremities)


Abdomen:  normal bowel sounds, non tender, soft


Neurologic/Psychiatric:  alert, normal mood/affect, oriented x 3





Laboratory Results





Last 24 Hours








Test


  6/1/17


11:31 6/1/17


16:33 6/1/17


20:17 6/2/17


04:03


 


Bedside Glucose 209 mg/dl  211 mg/dl  209 mg/dl  181 mg/dl 


 


Test


  6/2/17


07:46 


  


  


 


 


Bedside Glucose 135 mg/dl    











Assessment and Plan


Problem list:


SOB


Activity intolerance


COPD exacerbation, approaching end-stage disease


Thrush


CHF


Goals of care (Z51.5)





Palliative care plan:


-POLST form discussed and completed with patient as follows: DNR, full 

treatment with additional order "no long-term ventilation, trial of elective 

intubation if indicated," abx if life can be prolonged, and trial of artificial 

hydration/nutrition.


-Surrogate/POA is patient's daughter, Diego Pope.


-Plan is for home with home health, eventually will likely transition to 

hospice care. Patient has a living will which states when she is truly end-

stage she would like to comfortable.


-Patient's goal for now is to maintain her independence and to manage her SOB/

COPD. She feels well at this time.





Thank you for allowing me to care for this nice patient. Please contact me with 

any further palliative care needs.


Palliative Performance Scale:  70 %


Discharge planning:  home with home health

## 2017-06-02 NOTE — DISCHARGE INSTRUCTIONS
Discharge Instructions


Date of Service


Jun 2, 2017.





Admission


Reason for Admission:  [place] Exacerbation





Discharge


Discharge Diagnosis / Problem:  copd exacerbaton





Discharge Goals


Goal(s):  Diagnostic testing, Therapeutic intervention





Activity Recommendations


Activity Limitations:  resume your previous activity





.





Current Hospital Diet


Patient's current hospital diet: AHA Diet (Heart Healthy), Diabetes Type 2 Diet





Discharge Diet


Recommended Diet:  Regular Diet





Pending Studies


Studies pending at discharge:  no





Laboratory Results





Hemoglobin A1c








Test


  5/26/17


06:20 Range/Units


 


 


Estimated Average Glucose 143   mg/dl


 


Hemoglobin A1c 6.6 H 4.5-5.6  %











Medical Emergencies








.


Who to Call and When:





Medical Emergencies:  If at any time you feel your situation is an emergency, 

please call 911 immediately.





.





Non-Emergent Contact


Non-Emergency issues call your:  Primary Care Provider, Pulmonologist





.


.








"Provider Documentation" section prepared by Luis Alberto Casey.








.





VTE Core Measure


Inpt VTE Proph given/why not?:  Enoxaparin (Lovenox)HENRY, TMatthewE.D. Stockings, SCD's

## 2017-06-22 ENCOUNTER — HOSPITAL ENCOUNTER (OUTPATIENT)
Dept: HOSPITAL 45 - C.RC | Age: 72
Discharge: HOME | End: 2017-06-22
Attending: FAMILY MEDICINE
Payer: COMMERCIAL

## 2017-06-22 DIAGNOSIS — J44.9: Primary | ICD-10-CM

## 2017-06-23 NOTE — PULMONARY FUNCTION TEST
Interpretation is based off ATS criteria.

 

SPIROMETRY:  Severe obstructive ventilatory disease with an FEV1 of 38%

predicted.

 

BRONCHODILATOR RESPONSE:  Borderline bronchodilator response noted.

 

LUNG VOLUMES:  Signs of hyperinflation with residual volume of 150%.

 

DIFFUSION CAPACITY:  Severely reduced diffusion capacity at 38% predicted,

but corrected to 55% based off alveolar volume.

 

INTERPRETATION:  Severe obstructive ventilatory disease.

## 2017-06-26 ENCOUNTER — HOSPITAL ENCOUNTER (OUTPATIENT)
Dept: HOSPITAL 45 - C.LABSPEC | Age: 72
Discharge: HOME | End: 2017-06-26
Attending: NURSE PRACTITIONER
Payer: COMMERCIAL

## 2017-06-26 DIAGNOSIS — N39.41: Primary | ICD-10-CM

## 2017-08-30 ENCOUNTER — HOSPITAL ENCOUNTER (INPATIENT)
Dept: HOSPITAL 45 - C.EDB | Age: 72
LOS: 5 days | Discharge: HOME HEALTH | DRG: 291 | End: 2017-09-04
Attending: HOSPITALIST | Admitting: HOSPITALIST
Payer: COMMERCIAL

## 2017-08-30 VITALS — HEART RATE: 86 BPM | OXYGEN SATURATION: 95 %

## 2017-08-30 VITALS
SYSTOLIC BLOOD PRESSURE: 134 MMHG | TEMPERATURE: 98.78 F | HEART RATE: 118 BPM | OXYGEN SATURATION: 93 % | DIASTOLIC BLOOD PRESSURE: 71 MMHG

## 2017-08-30 VITALS — OXYGEN SATURATION: 94 % | HEART RATE: 87 BPM

## 2017-08-30 VITALS
WEIGHT: 217.16 LBS | BODY MASS INDEX: 38.48 KG/M2 | WEIGHT: 217.16 LBS | BODY MASS INDEX: 38.48 KG/M2 | HEIGHT: 63 IN | BODY MASS INDEX: 38.48 KG/M2 | HEIGHT: 63 IN

## 2017-08-30 VITALS
SYSTOLIC BLOOD PRESSURE: 160 MMHG | DIASTOLIC BLOOD PRESSURE: 79 MMHG | TEMPERATURE: 97.7 F | OXYGEN SATURATION: 90 % | HEART RATE: 91 BPM

## 2017-08-30 VITALS — OXYGEN SATURATION: 91 %

## 2017-08-30 DIAGNOSIS — I50.33: ICD-10-CM

## 2017-08-30 DIAGNOSIS — Z99.81: ICD-10-CM

## 2017-08-30 DIAGNOSIS — J44.1: ICD-10-CM

## 2017-08-30 DIAGNOSIS — J96.21: ICD-10-CM

## 2017-08-30 DIAGNOSIS — E11.9: ICD-10-CM

## 2017-08-30 DIAGNOSIS — Z87.891: ICD-10-CM

## 2017-08-30 DIAGNOSIS — E78.5: ICD-10-CM

## 2017-08-30 DIAGNOSIS — I11.0: Primary | ICD-10-CM

## 2017-08-30 LAB
ANION GAP SERPL CALC-SCNC: 3 MMOL/L (ref 3–11)
BASE EXCESS STD BLDV CALC-SCNC: 17.6 MEQ/L
BASOPHILS # BLD: 0.01 K/UL (ref 0–0.2)
BASOPHILS NFR BLD: 0.1 %
BUN SERPL-MCNC: 22 MG/DL (ref 7–18)
BUN/CREAT SERPL: 18.2 (ref 10–20)
CALCIUM SERPL-MCNC: 9.3 MG/DL (ref 8.5–10.1)
CHLORIDE SERPL-SCNC: 92 MMOL/L (ref 98–107)
CO2 SERPL-SCNC: 47 MMOL/L (ref 21–32)
COMPLETE: YES
CREAT CL PREDICTED SERPL C-G-VRATE: 46.2 ML/MIN
CREAT SERPL-MCNC: 1.2 MG/DL (ref 0.6–1.2)
EOSINOPHIL NFR BLD AUTO: 137 K/UL (ref 130–400)
GLUCOSE SERPL-MCNC: 201 MG/DL (ref 70–99)
HCT VFR BLD CALC: 32.1 % (ref 37–47)
IG%: 0.4 %
IMM GRANULOCYTES NFR BLD AUTO: 11.6 %
LYMPHOCYTES # BLD: 0.9 K/UL (ref 1.2–3.4)
MAGNESIUM SERPL-MCNC: 2.1 MG/DL (ref 1.8–2.4)
MCH RBC QN AUTO: 31.6 PG (ref 25–34)
MCHC RBC AUTO-ENTMCNC: 30.5 G/DL (ref 32–36)
MCV RBC AUTO: 103.5 FL (ref 80–100)
MONOCYTES NFR BLD: 6.6 %
NEUTROPHILS # BLD AUTO: 0.8 %
NEUTROPHILS NFR BLD AUTO: 80.5 %
PCO2 BLDV: 86 MMHG (ref 38–50)
PMV BLD AUTO: 10.7 FL (ref 7.4–10.4)
PO2 BLDV: 36 MMHG
POTASSIUM SERPL-SCNC: 3.3 MMOL/L (ref 3.5–5.1)
RBC # BLD AUTO: 3.1 M/UL (ref 4.2–5.4)
SAO2 % BLDV FROM PO2: 60.8 %
SODIUM SERPL-SCNC: 142 MMOL/L (ref 136–145)
WBC # BLD AUTO: 7.75 K/UL (ref 4.8–10.8)

## 2017-08-30 RX ADMIN — ROPINIROLE HYDROCHLORIDE SCH MG: 1 TABLET, FILM COATED ORAL at 20:49

## 2017-08-30 RX ADMIN — CEFTRIAXONE SODIUM SCH MLS/HR: 1 INJECTION, POWDER, FOR SOLUTION INTRAVENOUS at 17:34

## 2017-08-30 RX ADMIN — INSULIN ASPART SCH UNITS: 100 INJECTION, SOLUTION INTRAVENOUS; SUBCUTANEOUS at 21:04

## 2017-08-30 RX ADMIN — BUDESONIDE AND FORMOTEROL FUMARATE DIHYDRATE SCH PUFFS: 160; 4.5 AEROSOL RESPIRATORY (INHALATION) at 20:49

## 2017-08-30 RX ADMIN — NYSTATIN SCH ML: 100000 SUSPENSION ORAL at 17:34

## 2017-08-30 RX ADMIN — METHYLPREDNISOLONE SODIUM SUCCINATE SCH MLS/MIN: 1 INJECTION, POWDER, FOR SOLUTION INTRAMUSCULAR; INTRAVENOUS at 20:49

## 2017-08-30 RX ADMIN — HEPARIN SODIUM SCH UNIT: 10000 INJECTION, SOLUTION INTRAVENOUS; SUBCUTANEOUS at 21:05

## 2017-08-30 RX ADMIN — INSULIN ASPART SCH UNITS: 100 INJECTION, SOLUTION INTRAVENOUS; SUBCUTANEOUS at 17:39

## 2017-08-30 RX ADMIN — ROPINIROLE HYDROCHLORIDE SCH MG: 1 TABLET, FILM COATED ORAL at 18:30

## 2017-08-30 RX ADMIN — AZITHROMYCIN MONOHYDRATE ONE MLS/HR: 500 INJECTION, POWDER, LYOPHILIZED, FOR SOLUTION INTRAVENOUS at 12:07

## 2017-08-30 RX ADMIN — PRAVASTATIN SODIUM SCH MG: 10 TABLET ORAL at 20:49

## 2017-08-30 RX ADMIN — NYSTATIN SCH ML: 100000 SUSPENSION ORAL at 20:49

## 2017-08-30 RX ADMIN — IPRATROPIUM BROMIDE AND ALBUTEROL SULFATE SCH ML: .5; 3 SOLUTION RESPIRATORY (INHALATION) at 15:56

## 2017-08-30 RX ADMIN — METOPROLOL SUCCINATE SCH MG: 50 TABLET, EXTENDED RELEASE ORAL at 20:49

## 2017-08-30 RX ADMIN — IPRATROPIUM BROMIDE AND ALBUTEROL SULFATE SCH ML: .5; 3 SOLUTION RESPIRATORY (INHALATION) at 19:41

## 2017-08-30 RX ADMIN — AZITHROMYCIN MONOHYDRATE ONE MLS/HR: 500 INJECTION, POWDER, LYOPHILIZED, FOR SOLUTION INTRAVENOUS at 11:40

## 2017-08-30 NOTE — EMERGENCY ROOM VISIT NOTE
History


Report prepared by Randell:  Carine Oneal


Under the Supervision of:  Dr. Nj Medina M.D.


First contact with patient:  11:00


Chief Complaint:  RESPIRATORY DISTRESS


Stated Complaint:  SHORTNESS OF BREATH


Nursing Triage Summary:  


Patient arrived via EMS. Pt c/o SOB  for months, worse recently. Hx Chf and 


COPD. Wears 3L NC Oxygen at home at all times. Today after going upstairs to 


bathroom and back downstairs her sats were in the 60's. EMS reports patient was 


94% on 3L on their arrival, they administered Albuterol. On arrival to ED, 


patient asked to go to bathroom. 400cc clear yellow urine. After pt ambulated 


back to liter on 3L NC patient pulse ox dropped to 59%. 15L applied, sats to 


94%. Pt maintains 92 to 94% on 4L. Pt reports she takes Duonebs at home 3 to 4 


times a day. She is prescribed Lasix 40mg twice a day but only takes Lasix 20mg 


daily. Pt states cough, tight, loosens after neb treatements.





History of Present Illness


The patient is a 72 year old female who presents to the Emergency Room with 

complaints of persistent, worsened shortness of breath that began prior to 

arrival.  The patient states that she typically wears 3L of supplemental nasal 

cannula oxygen.  Nursing staff reports that the patient was found to have an 

oxygen saturation of 59% on 3L with exertion.  The patient reports a history of 

COPD and CHF.  She notes that she takes Lasix daily, but denies being on any 

anticoagulants.  The patient states that her symptoms are worsened with 

exertion.  She reports recent weight loss.  The patient reports a chronic cough

, noting that she brings up clear sputum.  The patient reports recent fatigue.  

She states that if she has to say a long sentence she has to stop part way 

through to catch her breath and states that her voice is weaker.  The patient 

denies any congestion, fever, chills, nausea, vomiting, diarrhea, constipation, 

or urinary symptoms.  She denies being on any current prednisone.





   Source of History:  patient


   Onset:  prior to arrival


   Position:  other (global)


   Quality:  other (shortness of breath)


   Timing:  worsening, other (persistent)


   Modifying Factors (Worsening):  exertion


   Associated Symptoms:  + cough, + fatigue, + weakness (voice), No fevers, No 

chills, No nausea, No vomiting, No diarrhea, No urinary symptoms


Note:


Associated Symptoms: weight loss





Review of Systems


See HPI for pertinent positives and negatives.  A total of ten systems were 

reviewed and were otherwise negative.





Past Medical & Surgical


Medical Problems:


(1) CHF (congestive heart failure)


(2) CHF exacerbation


(3) Diab Sanjuana Wo Compl, Type Ii Or Unspec Type, Uncontrolled


(4) Hyperlipidemia Nec/Nos


(5) Hypertension Nos


(6) Hypoxemia


(7) Influenza A


(8) Oral candidiasis


(9) Pneumonia


(10) Respiratory failure, acute-on-chronic








Family History





Cancer


Diabetes mellitus


FH: heart disease





Social History


Smoking Status:  Former Smoker


Alcohol Use:  none


Drug Use:  none


Marital Status:  single, 


Housing Status:  nursing home


Occupation Status:  retired





Current/Historical Medications


Scheduled


Beclomethasone Dip (Qvar), 80 MCG INH BID


Budesonide/Formoterol Fumarate (Symbicort 160-4.5 Mcg/Act), 2 PUFFS INH BID


Cyanocobalamin (Vitamin B12 500MCG), 1,000 MCG PO DAILY


Ferrous Sulfate (Kp Ferrous Sulfate), 1 TAB PO DAILY


Fluoxetine Hcl (Prozac), 20 MG PO QAM


Furosemide (Lasix), 20 MG PO DAILY


Glipizide (Glipizide), 5 MG PO BID


Ipratropium-Albuterol (Duoneb), 1 TREATMENT INH Q6HWA


Lisinopril (Prinivil), 5 MG PO DAILY


Metoprolol Succ (Toprol Xl) (Toprol-Xl ), 100 MG PO QPM


Mirabegron (Myrbetriq Er), 25 MG PO DAILY


Nystatin (Nystatin), 5 ML PO QID


Pravastatin Sodium (Pravastatin Sodium), 10 MG PO QPM


Ropinirole (Requip), 2 MG PO TID


Spironolactone (Aldactone), 12.5 MG PO QAM





Scheduled PRN


Amoxicillin (Amoxil), 500 MG PO UD PRN for DENTAL WORK


Tramadol HCl (Tramadol HCl), 50 MG PO Q6 PRN for Pain





Allergies


Coded Allergies:  


     Atropine (Verified  Allergy, Severe, RASH, 1/14/17)


     Dobutamine (Verified  Allergy, Severe, RASH, 1/14/17)





Physical Exam


Vital Signs











  Date Time  Temp Pulse Resp B/P (MAP) Pulse Ox O2 Delivery O2 Flow Rate FiO2


 


8/30/17 14:00  93 22 134/63 93 Nasal Cannula 5.0 


 


8/30/17 13:29  99      


 


8/30/17 12:52     95 Nasal Cannula 5.0 


 


8/30/17 12:08  84 26 166/81 100 Nebulizer 8.0 


 


8/30/17 11:21  87 12  94 Mask  


 


8/30/17 10:54     94 Oxymask 6.0 


 


8/30/17 10:36  70      


 


8/30/17 10:30 37.1 89 20 179/85 95 Oxymask 15.0 


 


8/30/17 10:30     91 Nasal Cannula 3.0 


 


8/30/17 10:30     59 Nasal Cannula 3.0 











Physical Exam


GENERAL: Awake, alert, chronically ill appearing, uncomfortable-appearing, in 

no distress


HENT: Normocephalic, atraumatic. Dry mucous membranes.


EYES: Normal conjunctiva. Sclera non-icteric.


NECK: Supple. No nuchal rigidity. FROM. Mild JVD.


RESPIRATORY: Scattered wheezes throughout, diminished breath sounds at both 

bases.


CARDIAC: ST, normal rhythm. Extremities warm and well perfused. Pulses equal.


ABDOMEN: Obese.  Soft, non-distended. No tenderness to palpation. No rebound or 

guarding. No masses.


RECTAL: Deferred.


MUSCULOSKELETAL: Chest examination reveals no tenderness. The back is 

symmetrical on inspection without obvious abnormality. There is no CVA 

tenderness to palpation. No joint edema. 


LOWER EXTREMITIES: 1+ bilateral lower extremity edema. Calves are equal size 

bilaterally and non-tender. No discoloration. 


NEURO: Normal sensorium. No sensory or motor deficits noted. 


SKIN: No rash or jaundice noted.





Medical Decision & Procedures


ER Provider


Diagnostic Interpretation:


X-ray: Per my interpretation, radiologist review. 





CHEST ONE VIEW PORTABLE





HISTORY: Atypical  CHEST PAIN





COMPARISON: Chest 5/25/2017.





FINDINGS: No pneumothorax. The heart remains mildly enlarged. Mildly tortuous


thoracic aorta. Trace bilateral pleural effusions and patchy bibasilar densities


are again noted. There is mild central pulmonary vascular congestion without


overt edema.





IMPRESSION:





1. No change in the trace bilateral pleural effusions and nonspecific patchy


bibasilar densities. This could represent atelectasis or pneumonia.


2. Stable mild cardiomegaly.


3. Mild central pulmonary vascular congestion without overt edema.





Electronically signed by:  Wagner Michel M.D.


8/30/2017 11:28 AM





Dictated Date/Time:  8/30/2017 11:26 AM





Laboratory Results











Test


  8/30/17


12:13


 


Venous Blood pH


  7.35


(7.36-7.41)


 


Venous Blood Partial Pressure


CO2 86 mmHg


(38.0-50.0)


 


Venous Blood Partial Pressure


O2 36 mmHg 


 


 


Venous Blood HCO3 46 mmol/L 


 


Venous Blood Oxygen Saturation 60.8 % 


 


Venous Blood Base Excess 17.6 mEq/L 


 


Pro-B-Type Natriuretic Peptide


  2528 pg/ml


(0-900)








Laboratory results reviewed by me





Medications Administered











 Medications


  (Trade)  Dose


 Ordered  Sig/Roya


 Route  Start Time


 Stop Time Status Last Admin


Dose Admin


 


 Albuterol/


 Ipratropium


  (Duoneb)  12 ml  ONE  ONCE


 INH  8/30/17 11:15


 8/30/17 11:16 DC 8/30/17 11:21


12 ML


 


 Methylprednisolone


 Sodium Succinate


  (Solu-Medrol IV)  125 mg  NOW  STAT


 IV  8/30/17 11:03


 8/30/17 11:10 DC 8/30/17 11:40


125 MG


 


 Magnesium Sulfate


  (Magnesium


 Sulfate)  2 gm  NOW  STAT


 IV  8/30/17 11:03


 8/30/17 11:10 DC 8/30/17 11:40


2 GM


 


 Azithromycin 500


 mg/Dextrose  255 ml @ 


 125 mls/hr  ONE  ONCE


 IV  8/30/17 11:15


 8/30/17 13:17 DC 8/30/17 12:07


125 MLS/HR


 


 Furosemide


  (Lasix Inj)  40 mg  NOW  STAT


 IV  8/30/17 13:09


 8/30/17 13:10 DC 8/30/17 13:59


40 MG











ECG


Indication:  SOB/dyspnea


Rate (beats per minute):  105


Rhythm:  sinus tachycardia


Findings:  PAC, no acute ischemic change, other (normal intervals)





ED Course


1102: The patient was evaluated in room B8. A complete history and physical 

exam was performed.





1103: Ordered Magnesium Sulfate 2 gm IV, Solu-Medrol  mg IV.





1115: Ordered Azithromycin 500 mg/Dextrose 255 ml @ 125 mls/hr IV, DuoNeb 12 ml 

INH.





1309: I reevaluated the patient and she is resting.  I discussed the exam 

findings with her and I discussed the treatment plan.  She verbalized complete 

understanding and agreement.  She is going to be evaluated for further 

treatment.  Ordered Lasix Inj 40 mg IV.





1349: I discussed the patients case with MEGAN Lara.  He is going 

to evaluate the patient for further treatment.





Medical Decision


Differential diagnosis:


Etiologies such as infections, reactive airway disease, pneumonia, pneumothorax

, COPD, CHF, cardiac ischemia, pulmonary embolism, musculoskeletal, 

gastrointestinal, as well as others were entertained.





The patient is an 72-year-old woman with a past medical history of CHF, COPD on 

3 L home O2 presents to the emergency department with worsening shortness of 

breath in setting of several days of cough congestion and sputum production 

history of present illness.  Of note, patient is supposed to be taking 40 mg of 

Lasix daily but has been only taking 20 because she has to walk upstairs to use 

the bathroom this is difficult.  On arrival the patient appears short of breath 

but in no acute distress.  Has diffuse wheezes throughout with diminished 

breath sounds at the bases.  Mild JVD as well.  EKG unchanged from prior.  

Troponin negative.  Chest x-ray shows cardiomegaly with mild venous congestion 

with a BNP 2900 which is up from most recent.  Patient was given 40 mg of Lasix 

IV.  Considering the patient's COPD history and exam was treated with 

continuous nebs and feels improvement.  Additionally given Solu-Medrol, 

magnesium, azithromycin. However considering the patient's work of breathing on 

arrival and desaturation with ambulation patient will be admitted for further 

management.





Medication Reconcilliation


Current Medication List:  was personally reviewed by me





Blood Pressure Screening


Patient's blood pressure:  Elevated blood pressure


Blood pressure disposition:  Elevated BP felt to be situational, Did not 

require urgent referral





Consults


Time Called:  1223


Consulting Physician:  MEGAN Lara


Returned Call:  [phone]


I discussed the patients case with MEGAN Lara.  He is going to 

evaluate the patient for further treatment.





Impression





 Primary Impression:  


 COPD exacerbation


 Additional Impression:  


 CHF exacerbation





Scribe Attestation


The scribe's documentation has been prepared under my direction and personally 

reviewed by me in its entirety. I confirm that the note above accurately 

reflects all work, treatment, procedures, and medical decision making performed 

by me.





Departure Information


Referrals


Marty Tineo D.O. (PCP)





Patient Instructions


Asthma - [facility], COPD - [facility], Croup - [facility], [facility]





Problem Qualifiers

## 2017-08-30 NOTE — DIAGNOSTIC IMAGING REPORT
CHEST ONE VIEW PORTABLE



HISTORY: Atypical  CHEST PAIN



COMPARISON: Chest 5/25/2017.



FINDINGS: No pneumothorax. The heart remains mildly enlarged. Mildly tortuous

thoracic aorta. Trace bilateral pleural effusions and patchy bibasilar densities

are again noted. There is mild central pulmonary vascular congestion without

overt edema.



IMPRESSION:



1. No change in the trace bilateral pleural effusions and nonspecific patchy

bibasilar densities. This could represent atelectasis or pneumonia.

2. Stable mild cardiomegaly.

3. Mild central pulmonary vascular congestion without overt edema.







Electronically signed by:  Wagner Michel M.D.

8/30/2017 11:28 AM



Dictated Date/Time:  8/30/2017 11:26 AM

## 2017-08-30 NOTE — HISTORY AND PHYSICAL
History & Physical


Date & Time of Service:


Aug 30, 2017 at 14:14


Chief Complaint:


Shortness Of Breath


Primary Care Physician:


Marty Tineo D.O.


History of Present Illness


Source:  patient, hospital records


Ms. Jacobson is a 73 y/o female with PMHx of Chronic Respiratory Failure 2/2 

COPD on Chronic 3L, Diastolic CHF, T2DM, HTN, and HLD who presents to the ED c/

o worsening SOB x 2 weeks.  She reports noticing more labored breathing over 

the past 2 weeks even at rest.  She has been using her DuoNeb 3-4 times a day 

which loosens the sputum in her chest but largely hasn't helped her overall 

symptoms.  She does have a chronic cough of clear to white sputum.  The only 

change in characteristic is increased frequency of this cough.  At home, 

patient is on 3 L NC continuously.  Today she reports going upstairs and back 

down when her saturations dropped into the 60s.  When EMS arrived, upon 

ambulation patient noted to have oxygen saturations at 59%.  Her symptoms are 

exacerbated with exertion however she has noticed some shortness of breath at 

rest that is not her baseline.  Prior to arrival, she states that she needed to 

take breaks even with talking.  She denies weight gain and expresses that she 

has noticed a recent weight loss. Associated generalized fatigue over the past 

several months.  She states that she has been predominantly housebound due to 

decreased energy and generalized weakness. 





In the ED, vitals have remained stable.  She has required increased oxygen 

needs from baseline.  CXR with trace bilateral pleural effusions and patchy 

bibasilar densities which are unchanged from May 2017, and mild pulmonary 

vascular congestion without overt edema.  CO2 47.  BNP 2528.  Echocardiogram 

from March 2016 with EF 65-70% and evidence of LVH.  She received Lasix 40 mg 

IV 1 dose, Solu-Medrol 125 mg IV 1 dose, and azithromycin 500 mg IV 1 dose.  

Patient will be admitted to telemetry for COPD exacerbation and possible 

diastolic CHF exacerbation.





Past Medical/Surgical History


Medical Problems:


(1) CHF (congestive heart failure)


Status: Chronic  





(2) CHF exacerbation


Status: Resolved  





(3) COPD (chronic obstructive pulmonary disease)


Status: Chronic  





(4) Diab Sanjuana Wo Compl, Type Ii Or Unspec Type, Uncontrolled


Status: Chronic  





(5) Hyperlipidemia Nec/Nos


Status: Chronic  





(6) Hypertension Nos


Status: Chronic  





(7) Respiratory failure, acute-on-chronic


Status: Chronic  











Family History





Cancer


Diabetes mellitus


FH: heart disease





Social History


Smoking Status:  Former Smoker


Smokeless Tobacco Use:  No


Alcohol Use:  none


Drug Use:  none


Marital Status:  single, 


Housing status:  lives alone


Occupational Status:  retired





Immunizations


History of Influenza Vaccine:  Yes


Influenza Vaccine Date:  Oct 17, 2013


History of Tetanus Vaccine?:  Yes


Tetanus Immunization Date:  Oct 8, 2013


History of Pneumococcal:  Yes


Pneumococcal Date:  Nov 17, 2010


History of Hepatitis B Vaccine:  No





Multi-Drug Resistant Organisms


History of MDRO:  No





Allergies


Coded Allergies:  


     Atropine (Verified  Allergy, Severe, RASH, 1/14/17)


     Dobutamine (Verified  Allergy, Severe, RASH, 1/14/17)





Home Medications


Scheduled


Beclomethasone Dip (Qvar), 80 MCG INH BID


Budesonide/Formoterol Fumarate (Symbicort 160-4.5 Mcg/Act), 2 PUFFS INH BID


Cyanocobalamin (Vitamin B12 500MCG), 1,000 MCG PO DAILY


Ferrous Sulfate (Kp Ferrous Sulfate), 1 TAB PO DAILY


Fluoxetine Hcl (Prozac), 20 MG PO QAM


Furosemide (Lasix), 20 MG PO DAILY


Glipizide (Glipizide), 5 MG PO BID


Ipratropium-Albuterol (Duoneb), 1 TREATMENT INH Q6HWA


Lisinopril (Prinivil), 5 MG PO DAILY


Metoprolol Succ (Toprol Xl) (Toprol-Xl ), 100 MG PO QPM


Mirabegron (Myrbetriq Er), 25 MG PO DAILY


Nystatin (Nystatin), 5 ML PO QID


Pravastatin Sodium (Pravastatin Sodium), 10 MG PO QPM


Ropinirole (Requip), 2 MG PO TID


Spironolactone (Aldactone), 12.5 MG PO QAM





Scheduled PRN


Amoxicillin (Amoxil), 500 MG PO UD PRN for DENTAL WORK


Tramadol HCl (Tramadol HCl), 50 MG PO Q6 PRN for Pain





Review of Systems


Constitutional:  + weight loss, + fatigue, No fever, No chills


ENT:  No nasal symptoms, No sore throat, No trouble swallowing


Respiratory:  + cough (chronic), + sputum (chronic - white), + wheezing, + 

dyspnea on exertion, + dyspnea at rest


Cardiovascular:  No chest pain, No palpitations


Abdomen:  No pain, No nausea, No vomiting, No diarrhea, No constipation


Musculoskeletal:  + swelling (mild increase from baseline bilat lower 

extremities), No calf pain


Genitourinary - Female:  No dysuria


Hematologic / Lymphatic:  No abnormal bleeding/bruising, No clotting problems


Integumentary:  No rash





Physical Exam


Vital Signs











  Date Time  Temp Pulse Resp B/P (MAP) Pulse Ox O2 Delivery O2 Flow Rate FiO2


 


8/30/17 14:00  93 22 134/63 93 Nasal Cannula 5.0 


 


8/30/17 13:29  99      


 


8/30/17 12:52     95 Nasal Cannula 5.0 


 


8/30/17 12:08  84 26 166/81 100 Nebulizer 8.0 


 


8/30/17 11:21  87 12  94 Mask  


 


8/30/17 10:54     94 Oxymask 6.0 


 


8/30/17 10:36  70      


 


8/30/17 10:30 37.1 89 20 179/85 95 Oxymask 15.0 


 


8/30/17 10:30     91 Nasal Cannula 3.0 


 


8/30/17 10:30     59 Nasal Cannula 3.0 








General Appearance:  WD/WN, no apparent distress, + obese


Head:  normocephalic, atraumatic


Eyes:  sclerae normal


ENT:  hearing grossly normal, pharynx normal


Neck:  supple, no JVD, trachea midline


Respiratory/Chest:  lungs clear (limited exam due to body habitus), normal 

breath sounds, no respiratory distress, no accessory muscle use


Cardiovascular:  regular rate, rhythm, no gallop, no murmur


Abdomen/GI:  normal bowel sounds, non tender, soft


Extremities/Musculoskelatal:  no calf tenderness, + swelling (trace to 1+ 

pitting edema of bilat lower extremities; mildly erthematous without open 

wounds or weeping)


Neurologic/Psych:  alert, oriented x 3


Skin:  normal color, warm/dry





Diagnostics


Laboratory Results





Results Past 24 Hours








Test


  8/30/17


12:13 Range/Units


 


 


White Blood Count 7.75 4.8-10.8  K/uL


 


Red Blood Count 3.10 4.2-5.4  M/uL


 


Hemoglobin 9.8 12.0-16.0  g/dL


 


Hematocrit 32.1 37-47  %


 


Mean Corpuscular Volume 103.5   fL


 


Mean Corpuscular Hemoglobin 31.6 25-34  pg


 


Mean Corpuscular Hemoglobin


Concent 30.5


  32-36  g/dl


 


 


Platelet Count 137 130-400  K/uL


 


Mean Platelet Volume 10.7 7.4-10.4  fL


 


Neutrophils (%) (Auto) 80.5  %


 


Lymphocytes (%) (Auto) 11.6  %


 


Monocytes (%) (Auto) 6.6  %


 


Eosinophils (%) (Auto) 0.8  %


 


Basophils (%) (Auto) 0.1  %


 


Neutrophils # (Auto) 6.24 1.4-6.5  K/uL


 


Lymphocytes # (Auto) 0.90 1.2-3.4  K/uL


 


Monocytes # (Auto) 0.51 0.11-0.59  K/uL


 


Eosinophils # (Auto) 0.06 0-0.5  K/uL


 


Basophils # (Auto) 0.01 0-0.2  K/uL


 


RDW Standard Deviation 55.6 36.4-46.3  fL


 


RDW Coefficient of Variation 14.6 11.5-14.5  %


 


Immature Granulocyte % (Auto) 0.4  %


 


Immature Granulocyte # (Auto) 0.03 0.00-0.02  K/uL


 


Sodium Level 142 136-145  mmol/L


 


Potassium Level 3.3 3.5-5.1  mmol/L


 


Chloride Level 92   mmol/L


 


Carbon Dioxide Level 47 21-32  mmol/L


 


Anion Gap 3.0 3-11  mmol/L


 


Blood Urea Nitrogen 22 7-18  mg/dl


 


Creatinine


  1.20


  0.60-1.20


mg/dl


 


Est Creatinine Clear Calc


Drug Dose 46.2


   ml/min


 


 


Estimated GFR (


American) 51.6


   


 


 


Estimated GFR (Non-


American 44.5


   


 


 


BUN/Creatinine Ratio 18.2 10-20  


 


Random Glucose 201 70-99  mg/dl


 


Calcium Level 9.3 8.5-10.1  mg/dl


 


Troponin I < 0.015 0-0.045  ng/ml


 


Pro-B-Type Natriuretic Peptide 2528 0-900  pg/ml








Microbiology Results


8/30/17 Blood Culture, Received


          Pending


8/30/17 Blood Culture, Received


          Pending





Diagnostic Radiology


CHEST ONE VIEW PORTABLE





FINDINGS: No pneumothorax. The heart remains mildly enlarged. Mildly tortuous


thoracic aorta. Trace bilateral pleural effusions and patchy bibasilar densities


are again noted. There is mild central pulmonary vascular congestion without


overt edema.





IMPRESSION:


1. No change in the trace bilateral pleural effusions and nonspecific patchy


bibasilar densities. This could represent atelectasis or pneumonia.


2. Stable mild cardiomegaly.


3. Mild central pulmonary vascular congestion without overt edema.





EKG


Sinus rhythm with Premature atrial complexes


Minimal voltage criteria for LVH, may be normal variant


Borderline ECG


When compared with ECG of 16-JAN-2017 06:43,


Premature atrial complexes are now Present


Vent. rate has increased BY 36 BPM


T wave amplitude has decreased in Anterior leads


Confirmed by BURAK KUHN ([id number]) on 8/30/2017 12:33:33 PM





Impression


Assessment and Plan


Ms. Jacobson is a 73 y/o female with PMHx of Chronic Respiratory Failure 2/2 

COPD on Chronic 3L, Diastolic CHF, T2DM, HTN, and HLD who presents to the ED c/

o worsening SOB x 2 weeks.





Acute on Chronic Respiratory Failure 2/2 COPD Exacerbation: Baseline 3L O2 NC


- Methyprednisolone 60 mg IV Q8H


- Ceftriaxone 1 g IV daily and Zithromax 250 mg IV x 4 days


- Duonebs MONICA and PRN


- Symbicort 2 puffs BID





Acute on Chronic Diastolic CHF?:


- Exam is difficult due to body habitus - does not appear to be in drastic CHF 

failure as COPD seems to be the main cause of symptoms


- Repeat echocardiogram for further evaluation


- Lasix 40 mg IV x 1 dose in ED - will await output and can give intermittent 

Lasix as exam dictates


   -- Home dose is Lasix 20 mg daily and Spironolactone 25 mg daily - both 

which are currently held while receiving IV dosing


- Follows with Gray Robert PA-C - patient mentions they have considered 

chronic daily steroids but  currently is not on any daily





T2DM: A1c 6.6


- Hold Glipizide


- SSI - goal range 140-180; correction 25; ratio 8





HTN:


- Lisinopril 5 mg daily and Metoprolol Succ 100 mg daily





HLD:


- Pravastatin 10 mg daily a





DVT Prophylaxis: Heparin 5000 units SC Q8H





Code Status: DO NOT RESUSCITATE





Disposition:


- Lives at home alone in a 2-story condo - PT/OT evaluations


   -- Discussed interest in possible need for acute rehab as she expresses 

decreased functioning and being housebound throughout the summer due to 

generalized weakness








Attending Addendum:








I have physically seen and examined this patient, have directed the physician 

assistants medical activities, and agree with the H&P as noted above with the 

following exceptions as noted.





The patient presents to the emergency department with worsening breathing over 

the past 2 weeks initially with exertion but now also at rest.  She's been 

using DuoNeb's 4 times a day without significant improvement in symptoms she 

wears 3 L nasal cannula oxygen continuously.,  When EMS arrived, she desatted 

to 59% with ambulation.





The patient denies palpitations, vision change, hearing change, sore throat, 

fevers, chills, sweats, nausea, vomiting, diarrhea or constipation, abdominal 

pain, pelvic pain, blood in urine or stool, dysuria, urinary frequency or 

urgency,  headache, memory loss, rash, abnormal bruising or bleeding, imbalance

, focal weakness, numbness or tingling in arms or legs, generalized arthralgias 

or myalgias, back or neck pain, night sweats, or allergy symptoms.


The review of systems is otherwise negative other than for that already noted 

above, and at least 10 systems have been reviewed.








The patient is awake, well-developed and adequately nourished, alert and 

oriented 3, normocephalic and atraumatic, lying in bed and in no acute 

distress.





HEENT--PERRL, EOMI, mucous membranes  and oropharynx dry.


Neck--supple, no JVD or bruits, thyroid normal, trachea midline, no adenopathy.


Heart--normal S1 and S2, no extra beats, no murmurs, rubs or gallops.


Lungs--coarse breath sounds with scattered wheezes bilaterally, no respiratory 

distress, no accessory muscle use.


Abdomen--normal bowel sounds and soft, nontender and nondistended, no hernias 

or masses,  no organomegaly.


Extremities--no cyanosis, clubbing.  Bilateral pretibial 1+ pitting Edema. 

There are good distal pulses b/l.


Dermatologic--normal skin turgor, normal color, warm and dry, no abnormal lymph 

nodes, no rash.


Neurologic--cranial nerves II through XII grossly intact, motor and sensory 

examination normal.


Rheumatologic--normal range of motion, nontender, muscles and joints.


Psychiatric--normal affect.








Assessment and Plan:





1.  Acute on chronic respiratory failure with hypoxia and hypercapnia/COPD 

exacerbation/acute on chronic diastolic CHF--


The patient will be admitted to telemetry for serial cardiac enzymes, cardiac 

rhythm monitoring and a 2-D echocardiogram with Dopplers.


Solu-Medrol 60 mg IV every 8 hours.


Ceftriaxone 1 g IV daily and Zithromax 500 mg IV daily.


Do nebs every 4 hours while awake and every 2 hours when necessary.


Baseline nasal cannula 3 L O2, increase to keep pulse ox greater than or equal 

to 92%.


Received Lasix 40 mg IV 1 in the ED.


Resume Lasix 20 mg by mouth daily as prolactin 25 mg by mouth daily tomorrow.


Continue lisinopril 5 mg by mouth daily and metoprolol succinate 100 mg by 

mouth daily.





2.  Diabetes mellitus--hold glipizide.


Place on Accu-Cheks before meals and at bedtime with NovoLog coverage per scale.





3.  Hyperlipidemia--continue pravastatin 10 mg by mouth daily.





4.  Deconditioning--consult physical therapy/occupational therapy.


Would benefit from inpatient rehabilitation.











Level of Care


Telemetry





Advanced Directives


Existing Advance Directive:  Yes


Existing Living Will:  No


Existing Power of :  No





Resuscitation Status


DO NOT RESUSCITATE





VTE Prophylaxis


VTE Risk Assessment Done? Y/N:  Yes


Risk Level:  Moderate


Given or contraindicated:  Unfractionated heparin SQ, T.E.D. Stockings, SCD's

## 2017-08-31 VITALS
HEART RATE: 68 BPM | SYSTOLIC BLOOD PRESSURE: 139 MMHG | TEMPERATURE: 98.6 F | OXYGEN SATURATION: 94 % | DIASTOLIC BLOOD PRESSURE: 79 MMHG

## 2017-08-31 VITALS
OXYGEN SATURATION: 90 % | TEMPERATURE: 98.42 F | SYSTOLIC BLOOD PRESSURE: 134 MMHG | DIASTOLIC BLOOD PRESSURE: 77 MMHG | HEART RATE: 74 BPM

## 2017-08-31 VITALS
OXYGEN SATURATION: 91 % | DIASTOLIC BLOOD PRESSURE: 82 MMHG | SYSTOLIC BLOOD PRESSURE: 143 MMHG | TEMPERATURE: 98.06 F | HEART RATE: 77 BPM

## 2017-08-31 VITALS — OXYGEN SATURATION: 93 %

## 2017-08-31 VITALS — HEART RATE: 62 BPM | OXYGEN SATURATION: 97 %

## 2017-08-31 VITALS — HEART RATE: 88 BPM | OXYGEN SATURATION: 96 %

## 2017-08-31 VITALS
SYSTOLIC BLOOD PRESSURE: 135 MMHG | HEART RATE: 80 BPM | OXYGEN SATURATION: 93 % | TEMPERATURE: 97.52 F | DIASTOLIC BLOOD PRESSURE: 75 MMHG

## 2017-08-31 VITALS
DIASTOLIC BLOOD PRESSURE: 80 MMHG | OXYGEN SATURATION: 90 % | TEMPERATURE: 98.06 F | HEART RATE: 66 BPM | SYSTOLIC BLOOD PRESSURE: 145 MMHG

## 2017-08-31 VITALS
HEART RATE: 58 BPM | TEMPERATURE: 98.42 F | SYSTOLIC BLOOD PRESSURE: 139 MMHG | OXYGEN SATURATION: 95 % | DIASTOLIC BLOOD PRESSURE: 68 MMHG

## 2017-08-31 VITALS — HEART RATE: 85 BPM | OXYGEN SATURATION: 95 %

## 2017-08-31 VITALS — HEART RATE: 78 BPM | OXYGEN SATURATION: 91 %

## 2017-08-31 LAB
ANION GAP SERPL CALC-SCNC: 4 MMOL/L (ref 3–11)
BASOPHILS # BLD: 0 K/UL (ref 0–0.2)
BASOPHILS NFR BLD: 0 %
BUN SERPL-MCNC: 30 MG/DL (ref 7–18)
BUN/CREAT SERPL: 19 (ref 10–20)
CALCIUM SERPL-MCNC: 9.6 MG/DL (ref 8.5–10.1)
CHLORIDE SERPL-SCNC: 91 MMOL/L (ref 98–107)
CO2 SERPL-SCNC: 42 MMOL/L (ref 21–32)
COMPLETE: YES
CREAT CL PREDICTED SERPL C-G-VRATE: 34 ML/MIN
CREAT SERPL-MCNC: 1.6 MG/DL (ref 0.6–1.2)
EOSINOPHIL NFR BLD AUTO: 163 K/UL (ref 130–400)
GLUCOSE SERPL-MCNC: 220 MG/DL (ref 70–99)
HCT VFR BLD CALC: 31.5 % (ref 37–47)
IG%: 0.3 %
IMM GRANULOCYTES NFR BLD AUTO: 3 %
LYMPHOCYTES # BLD: 0.29 K/UL (ref 1.2–3.4)
MAGNESIUM SERPL-MCNC: 2.3 MG/DL (ref 1.8–2.4)
MCH RBC QN AUTO: 32.1 PG (ref 25–34)
MCHC RBC AUTO-ENTMCNC: 31.4 G/DL (ref 32–36)
MCV RBC AUTO: 102.3 FL (ref 80–100)
MONOCYTES NFR BLD: 2.1 %
NEUTROPHILS # BLD AUTO: 0 %
NEUTROPHILS NFR BLD AUTO: 94.6 %
PHOSPHATE SERPL-MCNC: 2.8 MG/DL (ref 2.5–4.9)
PMV BLD AUTO: 10.6 FL (ref 7.4–10.4)
POTASSIUM SERPL-SCNC: 4.4 MMOL/L (ref 3.5–5.1)
RBC # BLD AUTO: 3.08 M/UL (ref 4.2–5.4)
SODIUM SERPL-SCNC: 138 MMOL/L (ref 136–145)
WBC # BLD AUTO: 9.67 K/UL (ref 4.8–10.8)

## 2017-08-31 RX ADMIN — INSULIN ASPART SCH UNITS: 100 INJECTION, SOLUTION INTRAVENOUS; SUBCUTANEOUS at 17:18

## 2017-08-31 RX ADMIN — IPRATROPIUM BROMIDE AND ALBUTEROL SULFATE SCH ML: .5; 3 SOLUTION RESPIRATORY (INHALATION) at 19:23

## 2017-08-31 RX ADMIN — Medication SCH MCG: at 07:57

## 2017-08-31 RX ADMIN — IPRATROPIUM BROMIDE AND ALBUTEROL SULFATE SCH ML: .5; 3 SOLUTION RESPIRATORY (INHALATION) at 15:27

## 2017-08-31 RX ADMIN — FUROSEMIDE SCH MG: 20 TABLET ORAL at 08:13

## 2017-08-31 RX ADMIN — IPRATROPIUM BROMIDE AND ALBUTEROL SULFATE SCH ML: .5; 3 SOLUTION RESPIRATORY (INHALATION) at 07:19

## 2017-08-31 RX ADMIN — FERROUS SULFATE TAB EC 325 MG (65 MG FE EQUIVALENT) SCH MG: 325 (65 FE) TABLET DELAYED RESPONSE at 08:13

## 2017-08-31 RX ADMIN — HEPARIN SODIUM SCH UNIT: 10000 INJECTION, SOLUTION INTRAVENOUS; SUBCUTANEOUS at 05:54

## 2017-08-31 RX ADMIN — METOPROLOL SUCCINATE SCH MG: 50 TABLET, EXTENDED RELEASE ORAL at 20:13

## 2017-08-31 RX ADMIN — SPIRONOLACTONE SCH MG: 25 TABLET, FILM COATED ORAL at 08:12

## 2017-08-31 RX ADMIN — ROPINIROLE HYDROCHLORIDE SCH MG: 1 TABLET, FILM COATED ORAL at 20:12

## 2017-08-31 RX ADMIN — NYSTATIN SCH ML: 100000 SUSPENSION ORAL at 12:29

## 2017-08-31 RX ADMIN — ROPINIROLE HYDROCHLORIDE SCH MG: 1 TABLET, FILM COATED ORAL at 14:04

## 2017-08-31 RX ADMIN — BUDESONIDE AND FORMOTEROL FUMARATE DIHYDRATE SCH PUFFS: 160; 4.5 AEROSOL RESPIRATORY (INHALATION) at 20:11

## 2017-08-31 RX ADMIN — AZITHROMYCIN MONOHYDRATE SCH MLS/HR: 500 INJECTION, POWDER, LYOPHILIZED, FOR SOLUTION INTRAVENOUS at 12:29

## 2017-08-31 RX ADMIN — HEPARIN SODIUM SCH UNIT: 10000 INJECTION, SOLUTION INTRAVENOUS; SUBCUTANEOUS at 14:08

## 2017-08-31 RX ADMIN — ROPINIROLE HYDROCHLORIDE SCH MG: 1 TABLET, FILM COATED ORAL at 08:14

## 2017-08-31 RX ADMIN — NYSTATIN SCH ML: 100000 SUSPENSION ORAL at 17:14

## 2017-08-31 RX ADMIN — BUDESONIDE AND FORMOTEROL FUMARATE DIHYDRATE SCH PUFFS: 160; 4.5 AEROSOL RESPIRATORY (INHALATION) at 07:57

## 2017-08-31 RX ADMIN — FLUOXETINE SCH MG: 20 CAPSULE ORAL at 08:14

## 2017-08-31 RX ADMIN — METHYLPREDNISOLONE SODIUM SUCCINATE SCH MLS/MIN: 1 INJECTION, POWDER, FOR SOLUTION INTRAMUSCULAR; INTRAVENOUS at 21:36

## 2017-08-31 RX ADMIN — NYSTATIN SCH ML: 100000 SUSPENSION ORAL at 08:13

## 2017-08-31 RX ADMIN — INSULIN ASPART SCH UNITS: 100 INJECTION, SOLUTION INTRAVENOUS; SUBCUTANEOUS at 12:31

## 2017-08-31 RX ADMIN — PRAVASTATIN SODIUM SCH MG: 10 TABLET ORAL at 20:12

## 2017-08-31 RX ADMIN — CEFTRIAXONE SODIUM SCH MLS/HR: 1 INJECTION, POWDER, FOR SOLUTION INTRAVENOUS at 20:09

## 2017-08-31 RX ADMIN — METHYLPREDNISOLONE SODIUM SUCCINATE SCH MLS/MIN: 1 INJECTION, POWDER, FOR SOLUTION INTRAMUSCULAR; INTRAVENOUS at 05:52

## 2017-08-31 RX ADMIN — IPRATROPIUM BROMIDE AND ALBUTEROL SULFATE SCH ML: .5; 3 SOLUTION RESPIRATORY (INHALATION) at 11:19

## 2017-08-31 RX ADMIN — MIRABEGRON SCH MG: 25 TABLET, FILM COATED, EXTENDED RELEASE ORAL at 08:13

## 2017-08-31 RX ADMIN — INSULIN ASPART SCH UNITS: 100 INJECTION, SOLUTION INTRAVENOUS; SUBCUTANEOUS at 08:18

## 2017-08-31 RX ADMIN — INSULIN ASPART SCH UNITS: 100 INJECTION, SOLUTION INTRAVENOUS; SUBCUTANEOUS at 20:14

## 2017-08-31 RX ADMIN — INSULIN GLARGINE SCH UNITS: 100 INJECTION, SOLUTION SUBCUTANEOUS at 08:19

## 2017-08-31 RX ADMIN — LISINOPRIL SCH MG: 5 TABLET ORAL at 08:14

## 2017-08-31 RX ADMIN — NYSTATIN SCH ML: 100000 SUSPENSION ORAL at 20:11

## 2017-08-31 RX ADMIN — HEPARIN SODIUM SCH UNIT: 10000 INJECTION, SOLUTION INTRAVENOUS; SUBCUTANEOUS at 21:37

## 2017-08-31 RX ADMIN — METHYLPREDNISOLONE SODIUM SUCCINATE SCH MLS/MIN: 1 INJECTION, POWDER, FOR SOLUTION INTRAMUSCULAR; INTRAVENOUS at 14:04

## 2017-08-31 NOTE — PROGRESS NOTE
Subjective


Date of Service:


Aug 31, 2017.


Problem List


Medical Problems:


(1) Cellulitis


Status: Acute  





(2) COPD exacerbation


Status: Acute  





(3) COPD exacerbation


Status: Acute  





(4) COPD exacerbation


Status: Acute  





(5) Hypoxia


Status: Acute  





(6) SOB (shortness of breath)


Status: Acute  











Objective


Vital Signs











  Date Time  Temp Pulse Resp B/P (MAP) Pulse Ox O2 Delivery O2 Flow Rate FiO2


 


8/31/17 16:02 36.7 66 19 145/80 (101) 90 Nasal Cannula 2.0 


 


8/31/17 15:40      Nasal Cannula 2.0 


 


8/31/17 15:27  78 16  91 Nasal Cannula 5.0 


 


8/31/17 12:00     93 Nasal Cannula 2.0 


 


8/31/17 11:19  62 16  97 Nasal Cannula 5.0 


 


8/31/17 11:16 37.0 68 18 139/79 (99) 94  5.0 


 


8/31/17 08:00     93 Nasal Cannula 2.0 


 


8/31/17 07:36 36.9 74 18 134/77 (96) 90  5.0 


 


8/31/17 07:23  88 16  96 Nasal Cannula 5.0 


 


8/31/17 04:53 36.4 80 16 135/75 (95) 93   


 


8/31/17 04:00      Nasal Cannula 5.0 


 


8/31/17 00:24 36.9 58 16 139/68 (91) 95  5.0 


 


8/30/17 20:03     91 Nasal Cannula 5.0 


 


8/30/17 19:41  86 16  95 Nasal Cannula 5.0 


 


8/30/17 18:51 36.5 91 26 160/79 (106) 90 Room Air  











Laboratory Results





Last 24 Hours








Test


  8/30/17


17:32 8/30/17


19:59 8/30/17


21:00 8/31/17


06:45


 


Bedside Glucose 464 mg/dl   236 mg/dl  265 mg/dl 


 


Magnesium Level  2.1 mg/dl   


 


Troponin I  < 0.015 ng/ml   


 


Test


  8/31/17


10:04 8/31/17


11:25 8/31/17


15:59 


 


 


White Blood Count 9.67 K/uL    


 


Red Blood Count 3.08 M/uL    


 


Hemoglobin 9.9 g/dL    


 


Hematocrit 31.5 %    


 


Mean Corpuscular Volume 102.3 fL    


 


Mean Corpuscular Hemoglobin 32.1 pg    


 


Mean Corpuscular Hemoglobin


Concent 31.4 g/dl 


  


  


  


 


 


Platelet Count 163 K/uL    


 


Mean Platelet Volume 10.6 fL    


 


Neutrophils (%) (Auto) 94.6 %    


 


Lymphocytes (%) (Auto) 3.0 %    


 


Monocytes (%) (Auto) 2.1 %    


 


Eosinophils (%) (Auto) 0.0 %    


 


Basophils (%) (Auto) 0.0 %    


 


Neutrophils # (Auto) 9.15 K/uL    


 


Lymphocytes # (Auto) 0.29 K/uL    


 


Monocytes # (Auto) 0.20 K/uL    


 


Eosinophils # (Auto) 0.00 K/uL    


 


Basophils # (Auto) 0.00 K/uL    


 


RDW Standard Deviation 53.5 fL    


 


RDW Coefficient of Variation 14.3 %    


 


Immature Granulocyte % (Auto) 0.3 %    


 


Immature Granulocyte # (Auto) 0.03 K/uL    


 


Sodium Level 138 mmol/L    


 


Potassium Level 4.4 mmol/L    


 


Chloride Level 91 mmol/L    


 


Carbon Dioxide Level 42 mmol/L    


 


Anion Gap 4.0 mmol/L    


 


Blood Urea Nitrogen 30 mg/dl    


 


Creatinine 1.60 mg/dl    


 


Est Creatinine Clear Calc


Drug Dose 34.0 ml/min 


  


  


  


 


 


Estimated GFR (


American) 36.4 


  


  


  


 


 


Estimated GFR (Non-


American 31.4 


  


  


  


 


 


BUN/Creatinine Ratio 19.0    


 


Random Glucose 220 mg/dl    


 


Calcium Level 9.6 mg/dl    


 


Phosphorus Level 2.8 mg/dl    


 


Magnesium Level 2.3 mg/dl    


 


Bedside Glucose  131 mg/dl  265 mg/dl  











Assessment and Plan


72-year-old white female admitted on 08/30/2017 because of worsening difficulty 

breathing,





 Acute on chronic respiratory failure with hypoxia and hypercapnia/COPD 

exacerbation/acute on chronic diastolic CHF--


Little better





 serial cardiac enzymes were negative, cardiac rhythm monitoring were 

unremarkable 


2-D echocardiogram with Dopplers was done


Results in below


The left ventricle is borderline dilated.


  *  There is mild concentric left ventricular hypertrophy.


  *  Left ventricular systolic function is normal.


  *  Grade I diastolic dysfunction, (abnormal relaxation pattern).


  *  Borderline left atrial enlargement.


  *  There is mild mitral regurgitation.


  *  Right ventricular systolic pressure is elevated at 50-60mmHg.


  *  Compared to an echocardiogram performed in March 2016, there is minimal 

change








Continue Solu-Medrol 60 mg IV every 8 hours.


Continue Ceftriaxone 1 g IV daily and Zithromax 500 mg IV daily because of 

possible COPD exacerbation and bronchitis


Do nebs every 4 hours while awake and every 2 hours when necessary.


Baseline nasal cannula 3 L O2 with chronic O2 dependent, i


Received Lasix 40 mg IV 1 in the ED.


Resume Lasix 20 mg by mouth daily Aldactone 25 mg by mouth daily tomorrow.


Continue lisinopril 5 mg by mouth daily and metoprolol succinate 100 mg by 

mouth daily.





Diabetes mellitus--hold glipizide.,  Continue sliding scale





Hyperlipidemia--continue pravastatin 10 mg by mouth daily.


Possible Deconditioning--consult physical therapy/occupational therapy.





Moderate obesity





GI and DVT prophylaxis is covered


Continued [facility] stay due to:  multiple IV medications needed


Discharge planning:  home

## 2017-08-31 NOTE — PULMONARY CONSULTATION
History


General


Date of Service:


Aug 31, 2017.


Stated Complaint:


Chf Exacerbation; Copd Exacerbation





HPI


The patient is a 72 year old female who presents to [facility] with complaints of Chf Exacerbation; Copd Exacerbation. The patient's 

primary care provider is Marty Tineo D.O..








Ms. Jacobson is a 72-year-old female with history of COPD on long-term oxygen 

therapy, diastolic CHF, hypertension, hyperlipidemia type 2 diabetes who 

presented to the ER on 08/30/2017 with complaints of worsening shortness of 

breath.  Patient states that she has been homebound for the last 8 weeks.  She 

has been ambulating within her home but noticed that she's had increased 

dyspnea on exertion and increased to dyspnea at rest.  She says she normally is 

on about 3 L of nasal cannula, but sometimes notices that she drops into the 

60s with exertion.  She denies any fevers, chills, chest pain, orthopnea or 

PND.  She denies any postnasal drip.She has history of GERD but denies any 

symptoms at this time.  She states that she has a chronic cough of whitish to 

clear sputum however it has increased in amount.  She denies any color change.  

She states that she sees Gray Ortega the pulmonary clinic.  PFT done on 06/22 /2017 show severe obstructive ventilatory disease with an FEV1 of 30% 

predicted.  


Bronchodilator response.  Lung volumes showed signs of hyperinflation with a 

residual volume of 160 %.  Diffusion capacity showed severely reduced DLCO at 30

% predicted.  She is currently on Qvar and Symbicort as well as DuoNeb 

inhalers.  She states that she's been using 2 nebs every every 4 hours 

secondary to increased chest tightness however feels that this has not resolved 

any of her symptoms.  She also states that she's had generalized lethargy and 

fatigue associated with lower extremity weakness and swelling.  





In the ED, her vitals were laboratory data was significant for a hemoglobin 9.8

, bicarbonate of 47, glucose 464, BNP of 2528, troponin less than 0.015.  Chest 

x-ray she used trace bilateral pleural effusion, stable mild cardiomegaly with 

mild central pulmonary vascular congestion.  She was given Lasix 40 mg IV 1 

dose, azithromycin 500 mg IV 1 dose, Solu-Medrol 125 mg IV 1 and admitted to 

telemetry for further acute on chronic hypoxic respiratory failure.





Today, on my evaluation as he states that she's feeling a little bit better.  

She's requesting rehabilitation upon discharge.  She is still short of breath 

at rest and on exertion.  She is currently on 2 L nasal cannula with a 

saturation of 83%.


Historian:  patient


Onset:  yesterday


Severity:  moderate


Complaint Status:  improved





Review of Systems


Constitutional:  reports: as stated in HPI


Eyes:  reports: as stated in HPI


Cardiovascular:  reports: as stated in HPI


Respiratory:  reports: as stated in HPI


Gastrointestinal:  reports: as stated in HPI


Genitourinary - Female:  reports: as stated in HPI


Musculoskeletal:  reports: as stated in HPI


Integumentary:  reports: as stated in HPI


Neurologic:  reports: as stated in HPI


Psychiatric:  reports: as stated in HPI


Endocrine:  as stated in HPI


Hematologic / Lymphatic:  as stated in HPI


Allergic / Immunologic:  as stated in HPI





Past Medical History


Past Medical History:


CHF


COPD


Hypertension


Hyperlipidemia


Type 2 diabetes


Past Medical History:  congestive heart failure, COPD, depression, diabetes, 

GERD, high cholesterol, hypertension, renal disease, other


Past Surgical History:


Hysterectomy


Orthopedic surgery


Past Surgical History:  cholecystectomy, hysterectomy, orthopedic surgery, 

spinal surgery, TKR, tonsillectomy





Family History





Cancer


Diabetes mellitus


FH: heart disease


She is a family history of diabetes, Cancer and heart disease


Parent: Cancer





Social History


She is a former smoker, she quit in 2003.  She smoked one pack per day for many 

years.


She denies any alcohol or illicit drug use.  She lives alone and is retired.


Hx Tobacco Use In Past Year?:  No (.)


Smoking Status:  Former Smoker


Alcohol:  socially


Drug Use:  none


Marital status:  single, 


Housing status:  lives alone


Occupational Status:  retired





Immunizations


History of Influenza Vaccine:  Yes


Influenza Vaccine Date:  Oct 17, 2013


History of Tetanus Vaccine?:  Yes


Tetanus Immunization Date:  Oct 8, 2013


History of Pneumococcal:  Yes


Pneumococcal Date:  Nov 17, 2010


History of Hepatitis B Vaccine:  No





History of MDRO


History of MDRO:  No





Allergies


Coded Allergies:  


     Atropine (Verified  Allergy, Severe, RASH, 1/14/17)


     Dobutamine (Verified  Allergy, Severe, RASH, 1/14/17)





Current Medications





Reported Home Medications








 Medications  Dose


 Route/Sig


 Max Daily Dose Days Date Category Dose


Instructions


 


 Qvar


  (Beclomethasone


 Dip) 80 Mcg/Act


 Aer  80 Mcg


 INH BID


    8/30/17 Reported 


 


 Myrbetriq Er


  (Mirabegron) 25


 Mg Tab  25 Mg


 PO DAILY


    8/30/17 Reported 


 


 Prinivil


  (Lisinopril) 5 Mg


 Tab  5 Mg


 PO DAILY


    8/30/17 Reported 


 


 Kp Ferrous


 Sulfate (Ferrous


 Sulfate) 325 Mg


 Tab  1 Tab


 PO DAILY


   30 8/30/17 Reported 


 


 Symbicort 160-4.5


 Mcg/Act


  (Budesonide/Formoterol


 Fumarate) 60


 Puffs/Inhaler Aero  2 Puffs


 INH BID


    6/2/17 Rx 


 


 Duoneb


  (Ipratropium-Albuterol)


 3 Ml Nebu  1 Treatment


 INH Q6HWA


   30 6/2/17 Rx  Use 1 neb treatment every 6 hours while awake.  May use


 every 2 hours as needed for shortness of breath & wheezing.


 


 Nystatin 5 Ml Susp  5 Ml


 PO QID


   11 1/20/17 Rx  Take 5 ml (1 teaspoon) by mouth four times a day.


 


 Aldactone


  (Spironolactone)


 25 Mg Tab  12.5 Mg


 PO QAM


    1/14/17 Reported 


 


 Amoxil


  (Amoxicillin) 500


 Mg Cap  500 Mg


 PO UD PRN


    1/14/17 Reported 


 


 Lasix


  (Furosemide) 20


 Mg Tab  20 Mg


 PO DAILY


    1/14/17 Reported 


 


 Tramadol HCl 50


 Mg Tab  50 Mg


 PO Q6 PRN


    1/14/17 Reported 


 


 Glipizide 5 Mg Tab  5 Mg


 PO BID


    1/14/17 Reported 


 


 Pravastatin


 Sodium 10 Mg Tab  10 Mg


 PO QPM


    5/11/14 Reported 


 


 Vitamin B12


 500MCG


  (Cyanocobalamin)


 500 Mcg Tab  1,000 Mcg


 PO DAILY


    5/11/14 Reported 


 


 Requip


  (Ropinirole HCl)


 2 Mg Tab  2 Mg


 PO TID


    4/10/13 Reported 


 


 Toprol-Xl


  (Metoprolol


 Succinate) 100 Mg


 Tabcr  100 Mg


 PO QPM


    4/10/13 Reported 


 


 Prozac


  (Fluoxetine Hcl)


 20 Mg Cap  20 Mg


 PO QAM


    7/13/12 Reported 











Physical


Physical Exam


Vital Signs:











  Date Time  Temp Pulse Resp B/P (MAP) Pulse Ox O2 Delivery O2 Flow Rate FiO2


 


8/31/17 16:02 36.7 66 19 145/80 (101) 90 Nasal Cannula 2.0 


 


8/31/17 15:40      Nasal Cannula 2.0 


 


8/31/17 15:27  78 16  91 Nasal Cannula 5.0 


 


8/31/17 12:00     93 Nasal Cannula 2.0 


 


8/31/17 11:19  62 16  97 Nasal Cannula 5.0 


 


8/31/17 11:16 37.0 68 18 139/79 (99) 94  5.0 


 


8/31/17 08:00     93 Nasal Cannula 2.0 


 


8/31/17 07:36 36.9 74 18 134/77 (96) 90  5.0 


 


8/31/17 07:23  88 16  96 Nasal Cannula 5.0 


 


8/31/17 04:53 36.4 80 16 135/75 (95) 93   


 


8/31/17 04:00      Nasal Cannula 5.0 


 


8/31/17 00:24 36.9 58 16 139/68 (91) 95  5.0 


 


8/30/17 20:03     91 Nasal Cannula 5.0 


 


8/30/17 19:41  86 16  95 Nasal Cannula 5.0 


 


8/30/17 18:51 36.5 91 26 160/79 (106) 90 Room Air  








General Appearance:  WD/WN, NO APPARENT DISTRESS, mild distress


Head:  NORMOCEPHALIC, ATRAUMATIC


Eyes:  PERRLA, NO DISCHARGE, EOMI, SCLERAE NORMAL, CONJUNCTIVAE NORMAL


ENT:  NORMAL MOUTH EXAM, NORMAL THROAT EXAM, dentures


Neck:  NORMAL RANGE OF MOTION, NO TENDERNESS, TRACHEA MIDLINE, NO STRIDOR, 

SUPPLE


Respiratory:  NO RESPIRATORY DISTRESS, rhonchi, wheezing


Cardiovasular:  REGULAR RATE/RHYTHM, NORMAL S1S2


Abdomen:  NON TENDER, NORMAL BOWEL SOUNDS (obese)


Back:  NORMAL INSPECTION


Upper Extremities:  other (tremulous)


Edema:  Bilateral LE (3+)


Neuro:  ALERT, ORIENTED x 3, NORMAL SPEECH, other (tremulous)


Psychiatric:  NORMAL AFFECT, NO SUICIDAL IDEATION, CONTRACTS FOR SAFETY





Diagnostics


Labs





Results Past 24 Hours








Test


  8/30/17


17:32 8/30/17


19:59 8/30/17


21:00 8/31/17


06:45 Range/Units


 


 


Bedside Glucose 464  236 265 70-90  mg/dl


 


Magnesium Level  2.1   1.8-2.4  mg/dl


 


Troponin I  < 0.015   0-0.045  ng/ml


 


Test


  8/31/17


10:04 8/31/17


11:25 8/31/17


15:59 


  Range/Units


 


 


White Blood Count 9.67    4.8-10.8  K/uL


 


Red Blood Count 3.08    4.2-5.4  M/uL


 


Hemoglobin 9.9    12.0-16.0  g/dL


 


Hematocrit 31.5    37-47  %


 


Mean Corpuscular Volume 102.3      fL


 


Mean Corpuscular Hemoglobin 32.1    25-34  pg


 


Mean Corpuscular Hemoglobin


Concent 31.4


  


  


  


  32-36  g/dl


 


 


Platelet Count 163    130-400  K/uL


 


Mean Platelet Volume 10.6    7.4-10.4  fL


 


Neutrophils (%) (Auto) 94.6     %


 


Lymphocytes (%) (Auto) 3.0     %


 


Monocytes (%) (Auto) 2.1     %


 


Eosinophils (%) (Auto) 0.0     %


 


Basophils (%) (Auto) 0.0     %


 


Neutrophils # (Auto) 9.15    1.4-6.5  K/uL


 


Lymphocytes # (Auto) 0.29    1.2-3.4  K/uL


 


Monocytes # (Auto) 0.20    0.11-0.59  K/uL


 


Eosinophils # (Auto) 0.00    0-0.5  K/uL


 


Basophils # (Auto) 0.00    0-0.2  K/uL


 


RDW Standard Deviation 53.5    36.4-46.3  fL


 


RDW Coefficient of Variation 14.3    11.5-14.5  %


 


Immature Granulocyte % (Auto) 0.3     %


 


Immature Granulocyte # (Auto) 0.03    0.00-0.02  K/uL


 


Sodium Level 138    136-145  mmol/L


 


Potassium Level 4.4    3.5-5.1  mmol/L


 


Chloride Level 91      mmol/L


 


Carbon Dioxide Level 42    21-32  mmol/L


 


Anion Gap 4.0    3-11  mmol/L


 


Blood Urea Nitrogen 30    7-18  mg/dl


 


Creatinine


  1.60


  


  


  


  0.60-1.20


mg/dl


 


Est Creatinine Clear Calc


Drug Dose 34.0


  


  


  


   ml/min


 


 


Estimated GFR (


American) 36.4


  


  


  


   


 


 


Estimated GFR (Non-


American 31.4


  


  


  


   


 


 


BUN/Creatinine Ratio 19.0    10-20  


 


Random Glucose 220    70-99  mg/dl


 


Calcium Level 9.6    8.5-10.1  mg/dl


 


Phosphorus Level 2.8    2.5-4.9  mg/dl


 


Magnesium Level 2.3    1.8-2.4  mg/dl


 


Bedside Glucose  131 265  70-90  mg/dl











Diagnostic Radiology


TTE 8/31/2017


*  -- Conclusions --


  *  The left ventricle is borderline dilated.


  *  There is mild concentric left ventricular hypertrophy.


  *  Left ventricular systolic function is normal.


  *  Grade I diastolic dysfunction, (abnormal relaxation pattern).


  *  Borderline left atrial enlargement.


  *  There is mild mitral regurgitation.


  *  Right ventricular systolic pressure is elevated at 50-60mmHg.


  *  Compared to an echocardiogram performed in March 2016, there is minimal 

change





Chest x-ray from 08/30/2017





FINDINGS: No pneumothorax. The heart remains mildly enlarged. Mildly tortuous


thoracic aorta. Trace bilateral pleural effusions and patchy bibasilar densities


are again noted. There is mild central pulmonary vascular congestion without


overt edema.





IMPRESSION:


1. No change in the trace bilateral pleural effusions and nonspecific patchy


bibasilar densities. This could represent atelectasis or pneumonia.


2. Stable mild cardiomegaly.


3. Mild central pulmonary vascular congestion without overt edema.





EKG


EKG 8/30/20 19:45





Ventricular rate of 87 bpm


Sinus rhythm with Premature atrial complexes with Aberrant conduction


Minimal voltage criteria for LVH, may be normal variant


Nonspecific ST abnormality


Abnormal ECG





Impression


Assessment and Plan


Acute on chronic hypoxic hypercapnic respiratory failure


COPD on long-term oxygen therapy


Diastolic dysfunction


Pulmonary hypertension


Deconditioning


Uncontrolled DM





Ms. Jacobson appears to have acute on chronic hypoxic respiratory failure.  I 

feel that this is multi factorial in nature.  I'll continue to treat this as a 

COPD exacerbation and she has had increased production of sputum however I feel 

that there is a strong component of obesity hypoventilation syndrome.  

Diastolic dysfunction which is evident and lower extremity edema and swelling.  

Due to her body habitus, she may have a component of obesity hypoventilation 

syndrome.  She also had has pulmonary hypertension seen on TTE  and RHC from 

2015, PAP 52/21. Probably secondary to pulmonary disease, not amenable to PH 

therapy since it is not group I. 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 

hypertension.  The treatment for this is to optimize underlying pulmonary and 

cardiac disease.  





Continue supplemental oxygen to maintain SaO2 between 88-92%


She should decompensate a trial of BiPAP may be warranted


Continue with azithromycin as it does have inflammatory properties.  However I 

don't think she requires ceftriaxone at this time


Continue with systemic corticosteroids and then switch to taper


Continue with Symbicort 160-4.52 puffs twice a day, however I would discontinue 

Qvar as patient does not require to inhaled 


corticosteroids.  I would add Spiriva to her regimen upon discharge


Continue  with DuoNeb every 4 hours


Continue with aggressive diuresis his spironolactone and Lasix to try to 

maintain a negative balance as her kidney function tolerate


Recommend the patient be seen by Occupational therapy and physical therapy 


She may benefit from a pulmonary rehabilitation program and was discharged 


Continue DVT prophylaxis








I appreciate the consult.

## 2017-08-31 NOTE — ECHOCARDIOGRAM REPORT
*NOTICE TO RECEIVING PARTY AGENCY**  This information is strictly Confidential and protected under 
[place] law.  [place] law prohibits you from making any further disclosure of this 
information unless further disclosure is expressly permitted by the written consent of the person to 
whom it pertains or is authorized by law.  A general authorization for the release of medical or 
other information is not sufficient for this purpose.  Hospital accepts no responsibility if the 
information is made available to any other person, INCLUDING THE PATIENT.



Interpretation Summary

  *  Name: JOSEPH BELLO  Study Date: 2017 09:07 AM  BP: 134/63 mmHg

  *  MRN: [id number]  Patient Location: C.2T\S\S242\S\2  HR: 73

  *  : 1945 ([birth year]yyyy)  Gender: Female  Height: 63 in

  *  Age: 72 yrs  Ethnicity: [place]  Weight: 214 lb

  *  Ordering Physician: [place] Brownlee

  *  Referring Physician: Self, Referred

  *  Performed By: Juanpablo Escobar RCS

  *  Accession# [id number]  Account# [id number]

  *  Reason For Study: CHF

  *  BSA: 2.0 m2

  *  -- Conclusions --

  *  The left ventricle is borderline dilated.

  *  There is mild concentric left ventricular hypertrophy.

  *  Left ventricular systolic function is normal.

  *  Grade I diastolic dysfunction, (abnormal relaxation pattern).

  *  Borderline left atrial enlargement.

  *  There is mild mitral regurgitation.

  *  Right ventricular systolic pressure is elevated at 50-60mmHg.

  *  Compared to an echocardiogram performed in 2016, there is minimal change

Procedure Details

  *  Left Ventricle

The left ventricle is borderline dilated.

There is mild concentric left ventricular hypertrophy.

Left ventricular systolic function is normal.

Ejection Fraction = 55-60%.

Grade I diastolic dysfunction, (abnormal relaxation pattern).

The left ventricular wall motion is normal.

  *  Right Ventricle

The right ventricle is normal in size and function.

  *  Atria

Borderline left atrial enlargement.

Right atrial size is normal.

  *  Mitral Valve

The mitral valve is grossly normal.

There is mild mitral regurgitation.

  *  Tricuspid Valve

The tricuspid valve is not well visualized.

There is trace tricuspid regurgitation.

Right ventricular systolic pressure is elevated at 50-60mmHg.

  *  Aortic Valve

The aortic valve is normal in structure and function.

No hemodynamically significant valvular aortic stenosis.

There is no significant aortic regurgitation.

  *  Great Vessels

The aortic root is normal size.

  *  Pericardium/Pleural

Trace pericardial effusion

  *  

  *  MMode 2D Measurements and Calculations

  *  RVDd 3.1 cm

  *  IVSd 1.3 cm

  *  

  *  LVIDd 5.6 cm

  *  LVIDs 4.0 cm

  *  LVPWd 1.3 cm

  *  

  *  IVS/LVPW 0.98 

  *  FS 28.7 %

  *  EDV(Teich) 150.5 ml

  *  ESV(Teich) 68.2 ml

  *  EF(Teich) 54.7 %

  *  

  *  EDV(cubed) 171.0 ml

  *  ESV(cubed) 61.9 ml

  *  EF(cubed) 63.8 %

  *  



  *  LV mass(C)d 301.4 grams

  *  LV mass(C)dI 151.4 grams/m\S\2

  *  

  *  SV(Teich) 82.4 ml

  *  SI(Teich) 41.4 ml/m\S\2

  *  SV(cubed) 109.1 ml

  *  SI(cubed) 54.8 ml/m\S\2

  *  

  *  Ao root diam 3.0 cm

  *  Ao root area 7.0 cm\S\2

  *  

  *  LVOT diam 2.0 cm

  *  LVOT area 3.2 cm\S\2

  *  



  *  EDV(MOD-sp4) 68.2 ml

  *  ESV(MOD-sp4) 24.2 ml

  *  EF(MOD-sp4) 64.5 %

  *  

  *  EDV(MOD-sp2) 88.1 ml

  *  ESV(MOD-sp2) 27.8 ml

  *  EF(MOD-sp2) 68.4 %

  *  

  *  SV(MOD-sp4) 44.0 ml

  *  SI(MOD-sp4) 22.1 ml/m\S\2

  *  

  *  SV(MOD-sp2) 60.3 ml

  *  SI(MOD-sp2) 30.3 ml/m\S\2

  *  



  *  

  *  Doppler Measurements and Calculations

  *  Ao V2 max 150.7 cm/sec

  *  Ao max PG 9.1 mmHg

  *  Ao max PG (full) 4.2 mmHg

  *  MARCELA(V,A) 2.4 cm\S\2

  *  MARCELA(V,D) 2.4 cm\S\2

  *  

  *  LV V1 max PG 4.8 mmHg

  *  

  *  LV V1 max 110.1 cm/sec

  *  

  *  TR max zahra 335.8 cm/sec

  *  



  *

## 2017-08-31 NOTE — MEDICAL STUDENT: [facility]
Med Student Progress Note


Date of Service


Aug 31, 2017.





Subjective


Pt evaluation today including:  conversation w/ patient, physical exam, chart 

review, lab review, review of studies


Pain:  None


PO Intake:  Diabetic diet


Voiding:  no voiding problems


Ms. Jacobson is a 71 y/o female with PMH of Chronic Respiratory Failure 2/2 

COPD on constant 3L Oxygen via NC at home, Diastolic CHF, DM type II, HTN, and 

HLD who presents to the ED c/o worsening SOB x 2 weeks. She was admitted to 

telemetry on 08/30/17 for COPD exacerbation and possible diastolic CHF 

exacerbation.





Hospital Day 1: Today she reports that she feels tired and weak all over. She 

explains that she has not had a car for the past 2 months and as a result, has 

been sedentary at home for two months. Now, when she tries to walk to the car 

or even talk too much, she becomes short of breath. At home, one flight of 

stairs will bring her oxygen saturation to high 50s while using 3L O2 via NC. 

She denies chest pain. She reports productive cough of white-yellow sputum. She 

reports bilateral lower extremity edema. 





Oxygen at 5L NC without shortness of breath with 93% saturation.





Review of Systems


Constitutional:  + weakness, + fatigue, No fever, No chills


Eyes:  No eye pain, No redness


ENT:  No nasal symptoms, No sore throat


Respiratory:  + cough, + sputum (yellow-white), + wheezing, + shortness of 

breath, + dyspnea on exertion, + dyspnea at rest


Cardiac:  + edema, No chest pain, No orthopnea, No palpitations


Abdomen:  No pain, No nausea, No vomiting, No diarrhea, No constipation


Musculoskeletal:  No muscle pain, No calf pain


Female :  No dysuria, No urinary frequency


Neurologic:  + numbness/tingling (bilateral lower extremities )


Psychiatric:  No depression symptoms, No anxiety


Heme:  No abnormal bleeding/bruising, No clotting problems


Endo:  + fatigue


Skin:  No rash, No itch





Objective


Vital Signs











  Date Time  Temp Pulse Resp B/P (MAP) Pulse Ox O2 Delivery O2 Flow Rate FiO2


 


8/31/17 07:36 36.9 74 18 134/77 (96) 90  5.0 


 


8/31/17 07:23  88 16  96 Nasal Cannula 5.0 


 


8/31/17 04:53 36.4 80 16 135/75 (95) 93   


 


8/31/17 04:00      Nasal Cannula 5.0 


 


8/31/17 00:24 36.9 58 16 139/68 (91) 95  5.0 


 


8/30/17 20:03     91 Nasal Cannula 5.0 


 


8/30/17 19:41  86 16  95 Nasal Cannula 5.0 


 


8/30/17 18:51 36.5 91 26 160/79 (106) 90 Room Air  


 


8/30/17 16:09 37.1 118 22 134/71 93 Nasal Cannula 5.0 


 


8/30/17 14:00  93 22 134/63 93 Nasal Cannula 5.0 


 


8/30/17 13:29  99      


 


8/30/17 12:52     95 Nasal Cannula 5.0 


 


8/30/17 12:08  84 26 166/81 100 Nebulizer 8.0 


 


8/30/17 11:21  87 12  94 Mask  


 


8/30/17 10:54     94 Oxymask 6.0 


 


8/30/17 10:36  70      


 


8/30/17 10:30 37.1 89 20 179/85 95 Oxymask 15.0 


 


8/30/17 10:30     91 Nasal Cannula 3.0 


 


8/30/17 10:30     59 Nasal Cannula 3.0 











Physical Exam


General Appearance:  WD/WN, no apparent distress, + obese, + pertinent finding (

horizontal head tremor)


ENT:  normal ENT inspection, hearing grossly normal, TMs normal


Neck:  supple, no adenopathy, thyroid normal, trachea midline


Respiratory/Chest:  chest non-tender, no accessory muscle use, + decreased 

breath sounds, + wheezing (expiratory wheezes right middle and lower lobe)


Cardiovascular:  regular rate, rhythm, no edema, no gallop, no murmur


Abdomen:  normal bowel sounds, non tender, soft, no organomegaly


Extremities:  non-tender, normal inspection, no calf tenderness, + pedal edema (

2+ pitting edema bilaterally to level of mid-calf )


Neurologic/Psychiatric:  CNs II-XII nml as tested, no motor/sensory deficits, 

alert, normal mood/affect, oriented x 3


Skin:  normal color, warm/dry, no rash


Lymphatic:  no adenopathy





Laboratory Results





Last 24 Hours








Test


  8/30/17


12:13 8/30/17


17:32 8/30/17


19:59 8/30/17


21:00


 


White Blood Count 7.75 K/uL    


 


Red Blood Count 3.10 M/uL    


 


Hemoglobin 9.8 g/dL    


 


Hematocrit 32.1 %    


 


Mean Corpuscular Volume 103.5 fL    


 


Mean Corpuscular Hemoglobin 31.6 pg    


 


Mean Corpuscular Hemoglobin


Concent 30.5 g/dl 


  


  


  


 


 


Platelet Count 137 K/uL    


 


Mean Platelet Volume 10.7 fL    


 


Neutrophils (%) (Auto) 80.5 %    


 


Lymphocytes (%) (Auto) 11.6 %    


 


Monocytes (%) (Auto) 6.6 %    


 


Eosinophils (%) (Auto) 0.8 %    


 


Basophils (%) (Auto) 0.1 %    


 


Neutrophils # (Auto) 6.24 K/uL    


 


Lymphocytes # (Auto) 0.90 K/uL    


 


Monocytes # (Auto) 0.51 K/uL    


 


Eosinophils # (Auto) 0.06 K/uL    


 


Basophils # (Auto) 0.01 K/uL    


 


RDW Standard Deviation 55.6 fL    


 


RDW Coefficient of Variation 14.6 %    


 


Immature Granulocyte % (Auto) 0.4 %    


 


Immature Granulocyte # (Auto) 0.03 K/uL    


 


Venous Blood pH 7.35    


 


Venous Blood Partial Pressure


CO2 86 mmHg 


  


  


  


 


 


Venous Blood Partial Pressure


O2 36 mmHg 


  


  


  


 


 


Venous Blood HCO3 46 mmol/L    


 


Venous Blood Oxygen Saturation 60.8 %    


 


Venous Blood Base Excess 17.6 mEq/L    


 


Sodium Level 142 mmol/L    


 


Potassium Level 3.3 mmol/L    


 


Chloride Level 92 mmol/L    


 


Carbon Dioxide Level 47 mmol/L    


 


Anion Gap 3.0 mmol/L    


 


Blood Urea Nitrogen 22 mg/dl    


 


Creatinine 1.20 mg/dl    


 


Est Creatinine Clear Calc


Drug Dose 46.2 ml/min 


  


  


  


 


 


Estimated GFR (


American) 51.6 


  


  


  


 


 


Estimated GFR (Non-


American 44.5 


  


  


  


 


 


BUN/Creatinine Ratio 18.2    


 


Random Glucose 201 mg/dl    


 


Calcium Level 9.3 mg/dl    


 


Troponin I < 0.015 ng/ml   < 0.015 ng/ml  


 


Pro-B-Type Natriuretic Peptide 2528 pg/ml    


 


Bedside Glucose  464 mg/dl   236 mg/dl 


 


Magnesium Level   2.1 mg/dl  











Medications





Current Inpatient Medications








 Medications


  (Trade)  Dose


 Ordered  Sig/Monica


 Route  Start Time


 Stop Time Status Last Admin


Dose Admin


 


 Heparin Sodium


  (Porcine)


  (Heparin Sq 5000


 Unit/0.5ml)  5,000 unit  Q8


 SQ  8/30/17 22:00


 9/29/17 21:59  8/31/17 05:54


5,000 UNIT


 


 Acetaminophen


  (Tylenol Tab)  650 mg  Q4H  PRN


 PO  8/30/17 14:00


 9/29/17 13:59   


 


 


 Al Hydrox/Mg


 Hydrox/Simethicone


  (Maalox Max Susp)  15 ml  Q4H  PRN


 PO  8/30/17 14:00


 9/29/17 13:59   


 


 


 Magnesium


 Hydroxide


  (Milk Of


 Magnesia Susp)  30 ml  Q12H  PRN


 PO  8/30/17 14:00


 9/29/17 13:59   


 


 


 Ondansetron HCl


  (Zofran Inj)  4 mg  Q6H  PRN


 IV  8/30/17 14:00


 9/29/17 13:59   


 


 


 Polyethylene


  (Miralax Powder


 Packet)  17 gm  DAILY  PRN


 PO  8/30/17 14:00


 9/29/17 13:59   


 


 


 Albuterol/


 Ipratropium


  (Duoneb)  3 ml  QIDR


 INH  8/30/17 16:00


 9/29/17 15:59  8/31/17 11:19


3 ML


 


 Budesonide/


 Formoterol


 Fumarate


  (Symbicort 160/


 4.5 Inh)  2 puffs  BID


 INH  8/30/17 21:00


 9/29/17 20:59  8/31/17 07:57


2 PUFFS


 


 Cyanocobalamin


  (Vitamin B-12


 Tab)  1,000 mcg  DAILY


 PO  8/31/17 09:00


 9/30/17 08:59  8/31/17 07:57


1,000 MCG


 


 Fluoxetine HCl


  (Prozac Cap)  20 mg  QAM


 PO  8/31/17 09:00


 9/30/17 08:59  8/31/17 08:14


20 MG


 


 Lisinopril


  (Zestril Tab)  5 mg  DAILY


 PO  8/31/17 09:00


 9/30/17 08:59  8/31/17 08:14


5 MG


 


 Metoprolol


 Succinate


  (Toprol Xl Tab)  100 mg  QPM


 PO  8/30/17 21:00


 9/29/17 20:59  8/30/17 20:49


100 MG


 


 Mirabegron


  (Myrbetriq Er)  25 mg  DAILY


 PO  8/31/17 09:00


 9/30/17 08:59  8/31/17 08:13


25 MG


 


 Pravastatin Sodium


  (Pravachol Tab)  10 mg  QPM


 PO  8/30/17 21:00


 9/29/17 20:59  8/30/17 20:49


10 MG


 


 Ropinirole HCl


  (Requip Tab)  2 mg  TID


 PO  8/30/17 21:00


 9/29/17 20:59  8/31/17 08:14


2 MG


 


 Tramadol HCl


  (Ultram Tab)  50 mg  Q6  PRN


 PO  8/30/17 14:00


 9/29/17 13:59   


 


 


 Ferrous Sulfate


  (Feosol Tab)  325 mg  DAILY


 PO  8/31/17 09:00


 9/30/17 08:59  8/31/17 08:13


325 MG


 


 Ceftriaxone


 Sodium 1 gm/


 Dextrose  50 ml @ 


 100 mls/hr  Q24H


 IV  8/30/17 18:00


 9/6/17 17:59  8/30/17 17:34


100 MLS/HR


 


 Azithromycin 250


 mg/Dextrose  252.5 ml @ 


 125 mls/hr  Q24H


 IV  8/31/17 12:00


 9/6/17 11:59  8/31/17 12:29


125 MLS/HR


 


 Nystatin


  (Mycostatin Susp)  5 ml  QID


 PO  8/30/17 17:00


 9/9/17 16:59  8/31/17 12:29


5 ML


 


 Insulin Aspart


  (novoLOG ASPART)  **SLIDING


 SCALE**


 **If C...  ACHS


 SC  8/30/17 16:00


 9/29/17 15:59  8/31/17 12:31


6 UNITS


 


 Glucose


  (Glucose 40% Gel)  15-30


 GRAMS 15


 GRAMS...  UD  PRN


 PO  8/30/17 14:30


 9/29/17 14:29   


 


 


 Glucose


  (Glucose Chew


 Tab)  4-8


 Tablets 4


 Tabl...  UD  PRN


 PO  8/30/17 14:30


 9/29/17 14:29   


 


 


 Dextrose


  (Dextrose 50%


 50ML Syringe)  25-50ML OF


 50% DW IV


 FOR...  UD  PRN


 IV  8/30/17 14:30


 9/29/17 14:29   


 


 


 Glucagon


  (Glucagon Inj)  1 mg  UD  PRN


 SQ  8/30/17 14:30


 9/29/17 14:29   


 


 


 Albuterol/


 Ipratropium


  (Duoneb)  3 ml  Q2H  PRN


 INH  8/30/17 17:15


 9/29/17 17:14   


 


 


 Furosemide


  (Lasix Tab)  20 mg  DAILY


 PO  8/31/17 09:00


 9/30/17 08:59  8/31/17 08:13


20 MG


 


 Spironolactone


  (Aldactone Tab)  12.5 mg  QAM


 PO  8/31/17 09:00


 9/30/17 08:59  8/31/17 08:12


12.5 MG


 


 Insulin Glargine


  (Lantus Solostar


 Pen)  16 units  DAILY


 SC  8/31/17 09:00


 9/30/17 08:59  8/31/17 08:19


16 UNITS


 


 Methylprednisolone


 Sodium Succinate


 30 mg/Syringe  0.48 ml @ 


 1.5 mls/min  Q8H


 IV  8/30/17 22:00


 9/29/17 21:59  8/31/17 05:52


1.5 MLS/MIN











Diagnostic Radiology


ECHO 08/30/17 


*  -- Conclusions --


  *  The left ventricle is borderline dilated.


  *  There is mild concentric left ventricular hypertrophy.


  *  Left ventricular systolic function is normal.


  *  Grade I diastolic dysfunction, (abnormal relaxation pattern).


  *  Borderline left atrial enlargement.


  *  There is mild mitral regurgitation.


  *  Right ventricular systolic pressure is elevated at 50-60mmHg.


  *  Compared to an echocardiogram performed in March 2016, there is minimal 

change








CHEST ONE VIEW PORTABLE


FINDINGS: No pneumothorax. The heart remains mildly enlarged. Mildly tortuous


thoracic aorta. Trace bilateral pleural effusions and patchy bibasilar densities


are again noted. There is mild central pulmonary vascular congestion without


overt edema.





IMPRESSION:


1. No change in the trace bilateral pleural effusions and nonspecific patchy


bibasilar densities. This could represent atelectasis or pneumonia.


2. Stable mild cardiomegaly.


3. Mild central pulmonary vascular congestion without overt edema.





EKG


Sinus rhythm with Premature atrial complexes


Minimal voltage criteria for LVH, may be normal variant


Borderline ECG


When compared with ECG of 16-JAN-2017 06:43,


Premature atrial complexes are now Present


Vent. rate has increased BY 36 BPM


T wave amplitude has decreased in Anterior leads


Confirmed by BURAK KUHN ([id number]) on 8/30/2017 12:33:33 PM





Assessment and Plan


Assessment and Plan:


ASSESSMENT: 


Ms. Jacobson is a 71 y/o female with PMH of Chronic Respiratory Failure 2/2 

COPD on constant 3L Oxygen via NC at home, Diastolic CHF, DM type II, HTN, and 

HLD who presents to the ED c/o worsening SOB x 2 weeks. Etiology of her 

progressive dyspnea on exertion is likely multifactorial, with acute 

exacerbations of both chronic COPD and CHF contributing. 





PLAN: 


Acute on Chronic Respiratory Failure 2/2 COPD Exacerbation, baseline 3L O2 NC


Bronchodilators: Albuterol/Ipratropium (Duonebs) MONICA Q4h and PRN Q2h and 

Budesonide/ Formoterol Fumarate (Symbicort) 2 puffs BID


Corticosteroids: Methyprednisolone 60 mg IV Q8H


Antibiotics: Ceftriaxone 1 g IV daily and Azythromycin (Zithromax) 250 mg IV x 

4 days


Oxygen: Baseline nasal cannula 3 L O2, increase PRN to keep pulse ox greater 

than or equal to 92%


Prevention: encoruage annual influenza vaccine, pneumovax. Quit smoking in 

2003. 


Follows with Gray Robert PA-C - patient mentions they have considered chronic 

daily steroids but  currently is not on any daily


Continue Acetaminophen (Tylenol Tab)    650 mg    Q4H  PRN for pain control 





Acute on Chronic Diastolic CHF


Monitor serial cardiac enzymes adn cardiac rhythm monitoring 


Repeat 2-D echocardiogram with Dopplers 


Furosemide (Lasix Tab)    20 mg po    DAILY


Spironolactone (Aldactone Tab) 12.5 mg po    QAM





Diabetes Mellitus type II 


Most recent Hb A1c 6.6


Hold Glipizide


SSI - goal range 140-180; correction 25; ratio 8





HTN


Place on Accu-Cheks before meals and at bedtime with NovoLog coverage per scale.


Lisinopril 5 mg daily 


Metoprolol Succ 100 mg daily





HLD


Pravastatin 10 mg daily a





Deconditioning


Consult physical therapy/occupational therapy


Would benefit from inpatient rehabilitation.





GI


Al Hydrox/Mg Hydrox/Simethicone (Maalox Max Susp)    15 ml po    Q4H  PRN PO    

       


Magnesium Hydroxide(Milk Of Magnesia Susp)    30 ml po    Q12H  PRN PO   


Ondansetron HCl (Zofran Inj)          4 mg IV    Q6H  PRN IV   


Polyethylene(Miralax PowderPacket)       17 gm po    DAILY  PRN 


Cyanocobalamin(Vitamin B-12 Tab)       1,000 mcg po    DAILY             





DVT Prophylaxis: 


Heparin 5000 units SC Q8H





Code Status: 


DO NOT RESUSCITATE





Disposition:


- Lives at home alone in a 2-story condo - PT/OT evaluations


 Discussed interest in possible need for acute rehab as she expresses decreased 

functioning and being housebound throughout the summer due to generalized 

weakness

## 2017-09-01 VITALS
SYSTOLIC BLOOD PRESSURE: 161 MMHG | HEART RATE: 64 BPM | TEMPERATURE: 98.06 F | OXYGEN SATURATION: 90 % | DIASTOLIC BLOOD PRESSURE: 90 MMHG

## 2017-09-01 VITALS
OXYGEN SATURATION: 93 % | HEART RATE: 64 BPM | TEMPERATURE: 97.52 F | SYSTOLIC BLOOD PRESSURE: 151 MMHG | DIASTOLIC BLOOD PRESSURE: 83 MMHG

## 2017-09-01 VITALS
TEMPERATURE: 97.52 F | SYSTOLIC BLOOD PRESSURE: 162 MMHG | OXYGEN SATURATION: 91 % | DIASTOLIC BLOOD PRESSURE: 81 MMHG | HEART RATE: 63 BPM

## 2017-09-01 VITALS
OXYGEN SATURATION: 92 % | DIASTOLIC BLOOD PRESSURE: 68 MMHG | TEMPERATURE: 97.88 F | HEART RATE: 81 BPM | SYSTOLIC BLOOD PRESSURE: 130 MMHG

## 2017-09-01 VITALS — OXYGEN SATURATION: 93 %

## 2017-09-01 VITALS — OXYGEN SATURATION: 97 % | HEART RATE: 81 BPM

## 2017-09-01 VITALS — HEART RATE: 65 BPM | OXYGEN SATURATION: 96 %

## 2017-09-01 VITALS
DIASTOLIC BLOOD PRESSURE: 95 MMHG | SYSTOLIC BLOOD PRESSURE: 169 MMHG | TEMPERATURE: 97.52 F | OXYGEN SATURATION: 94 % | HEART RATE: 69 BPM

## 2017-09-01 VITALS
TEMPERATURE: 98.06 F | OXYGEN SATURATION: 95 % | SYSTOLIC BLOOD PRESSURE: 176 MMHG | DIASTOLIC BLOOD PRESSURE: 99 MMHG | HEART RATE: 67 BPM

## 2017-09-01 VITALS
TEMPERATURE: 98.78 F | DIASTOLIC BLOOD PRESSURE: 95 MMHG | OXYGEN SATURATION: 94 % | HEART RATE: 66 BPM | SYSTOLIC BLOOD PRESSURE: 164 MMHG

## 2017-09-01 VITALS — OXYGEN SATURATION: 95 % | HEART RATE: 88 BPM

## 2017-09-01 VITALS — OXYGEN SATURATION: 97 % | HEART RATE: 76 BPM

## 2017-09-01 LAB
ANION GAP SERPL CALC-SCNC: 6 MMOL/L (ref 3–11)
BASOPHILS # BLD: 0 K/UL (ref 0–0.2)
BASOPHILS NFR BLD: 0 %
BUN SERPL-MCNC: 43 MG/DL (ref 7–18)
BUN/CREAT SERPL: 25.1 (ref 10–20)
CALCIUM SERPL-MCNC: 8.7 MG/DL (ref 8.5–10.1)
CHLORIDE SERPL-SCNC: 89 MMOL/L (ref 98–107)
CO2 SERPL-SCNC: 39 MMOL/L (ref 21–32)
COMPLETE: YES
CREAT CL PREDICTED SERPL C-G-VRATE: 32.6 ML/MIN
CREAT SERPL-MCNC: 1.7 MG/DL (ref 0.6–1.2)
EOSINOPHIL NFR BLD AUTO: 153 K/UL (ref 130–400)
GLUCOSE SERPL-MCNC: 293 MG/DL (ref 70–99)
HCT VFR BLD CALC: 31.1 % (ref 37–47)
IG%: 0.3 %
IMM GRANULOCYTES NFR BLD AUTO: 5.9 %
LYMPHOCYTES # BLD: 0.43 K/UL (ref 1.2–3.4)
MAGNESIUM SERPL-MCNC: 2.6 MG/DL (ref 1.8–2.4)
MCH RBC QN AUTO: 31.7 PG (ref 25–34)
MCHC RBC AUTO-ENTMCNC: 30.9 G/DL (ref 32–36)
MCV RBC AUTO: 102.6 FL (ref 80–100)
MONOCYTES NFR BLD: 2.6 %
NEUTROPHILS # BLD AUTO: 0 %
NEUTROPHILS NFR BLD AUTO: 91.2 %
PHOSPHATE SERPL-MCNC: 5.1 MG/DL (ref 2.5–4.9)
PMV BLD AUTO: 11 FL (ref 7.4–10.4)
POTASSIUM SERPL-SCNC: 4.4 MMOL/L (ref 3.5–5.1)
RBC # BLD AUTO: 3.03 M/UL (ref 4.2–5.4)
SODIUM SERPL-SCNC: 134 MMOL/L (ref 136–145)
WBC # BLD AUTO: 7.23 K/UL (ref 4.8–10.8)

## 2017-09-01 RX ADMIN — INSULIN ASPART SCH UNITS: 100 INJECTION, SOLUTION INTRAVENOUS; SUBCUTANEOUS at 13:24

## 2017-09-01 RX ADMIN — IPRATROPIUM BROMIDE AND ALBUTEROL SULFATE SCH ML: .5; 3 SOLUTION RESPIRATORY (INHALATION) at 11:18

## 2017-09-01 RX ADMIN — Medication SCH MCG: at 07:48

## 2017-09-01 RX ADMIN — MIRABEGRON SCH MG: 25 TABLET, FILM COATED, EXTENDED RELEASE ORAL at 07:49

## 2017-09-01 RX ADMIN — FERROUS SULFATE TAB EC 325 MG (65 MG FE EQUIVALENT) SCH MG: 325 (65 FE) TABLET DELAYED RESPONSE at 07:48

## 2017-09-01 RX ADMIN — METHYLPREDNISOLONE SODIUM SUCCINATE SCH MLS/MIN: 1 INJECTION, POWDER, FOR SOLUTION INTRAMUSCULAR; INTRAVENOUS at 05:38

## 2017-09-01 RX ADMIN — AZITHROMYCIN MONOHYDRATE SCH MLS/HR: 500 INJECTION, POWDER, LYOPHILIZED, FOR SOLUTION INTRAVENOUS at 13:20

## 2017-09-01 RX ADMIN — LISINOPRIL SCH MG: 5 TABLET ORAL at 07:48

## 2017-09-01 RX ADMIN — INSULIN ASPART SCH UNITS: 100 INJECTION, SOLUTION INTRAVENOUS; SUBCUTANEOUS at 07:53

## 2017-09-01 RX ADMIN — ROPINIROLE HYDROCHLORIDE SCH MG: 1 TABLET, FILM COATED ORAL at 20:18

## 2017-09-01 RX ADMIN — BUDESONIDE AND FORMOTEROL FUMARATE DIHYDRATE SCH PUFFS: 160; 4.5 AEROSOL RESPIRATORY (INHALATION) at 20:15

## 2017-09-01 RX ADMIN — HEPARIN SODIUM SCH UNIT: 10000 INJECTION, SOLUTION INTRAVENOUS; SUBCUTANEOUS at 13:25

## 2017-09-01 RX ADMIN — FLUOXETINE SCH MG: 20 CAPSULE ORAL at 07:55

## 2017-09-01 RX ADMIN — FUROSEMIDE SCH MG: 20 TABLET ORAL at 07:50

## 2017-09-01 RX ADMIN — IPRATROPIUM BROMIDE AND ALBUTEROL SULFATE SCH ML: .5; 3 SOLUTION RESPIRATORY (INHALATION) at 15:31

## 2017-09-01 RX ADMIN — METHYLPREDNISOLONE SODIUM SUCCINATE SCH MLS/MIN: 1 INJECTION, POWDER, FOR SOLUTION INTRAMUSCULAR; INTRAVENOUS at 21:58

## 2017-09-01 RX ADMIN — SPIRONOLACTONE SCH MG: 25 TABLET, FILM COATED ORAL at 07:50

## 2017-09-01 RX ADMIN — NYSTATIN SCH ML: 100000 SUSPENSION ORAL at 07:49

## 2017-09-01 RX ADMIN — NYSTATIN SCH ML: 100000 SUSPENSION ORAL at 13:20

## 2017-09-01 RX ADMIN — PRAVASTATIN SODIUM SCH MG: 10 TABLET ORAL at 20:18

## 2017-09-01 RX ADMIN — BUDESONIDE AND FORMOTEROL FUMARATE DIHYDRATE SCH PUFFS: 160; 4.5 AEROSOL RESPIRATORY (INHALATION) at 07:50

## 2017-09-01 RX ADMIN — HEPARIN SODIUM SCH UNIT: 10000 INJECTION, SOLUTION INTRAVENOUS; SUBCUTANEOUS at 22:00

## 2017-09-01 RX ADMIN — INSULIN ASPART SCH UNITS: 100 INJECTION, SOLUTION INTRAVENOUS; SUBCUTANEOUS at 20:19

## 2017-09-01 RX ADMIN — NYSTATIN SCH ML: 100000 SUSPENSION ORAL at 16:29

## 2017-09-01 RX ADMIN — METOPROLOL SUCCINATE SCH MG: 50 TABLET, EXTENDED RELEASE ORAL at 20:18

## 2017-09-01 RX ADMIN — PANTOPRAZOLE SCH MG: 40 TABLET, DELAYED RELEASE ORAL at 07:49

## 2017-09-01 RX ADMIN — INSULIN GLARGINE SCH UNITS: 100 INJECTION, SOLUTION SUBCUTANEOUS at 07:54

## 2017-09-01 RX ADMIN — NYSTATIN SCH ML: 100000 SUSPENSION ORAL at 20:15

## 2017-09-01 RX ADMIN — HEPARIN SODIUM SCH UNIT: 10000 INJECTION, SOLUTION INTRAVENOUS; SUBCUTANEOUS at 05:37

## 2017-09-01 RX ADMIN — ROPINIROLE HYDROCHLORIDE SCH MG: 1 TABLET, FILM COATED ORAL at 13:21

## 2017-09-01 RX ADMIN — IPRATROPIUM BROMIDE AND ALBUTEROL SULFATE SCH ML: .5; 3 SOLUTION RESPIRATORY (INHALATION) at 19:59

## 2017-09-01 RX ADMIN — IPRATROPIUM BROMIDE AND ALBUTEROL SULFATE SCH ML: .5; 3 SOLUTION RESPIRATORY (INHALATION) at 06:52

## 2017-09-01 RX ADMIN — INSULIN ASPART SCH UNITS: 100 INJECTION, SOLUTION INTRAVENOUS; SUBCUTANEOUS at 17:20

## 2017-09-01 RX ADMIN — METHYLPREDNISOLONE SODIUM SUCCINATE SCH MLS/MIN: 1 INJECTION, POWDER, FOR SOLUTION INTRAMUSCULAR; INTRAVENOUS at 13:21

## 2017-09-01 RX ADMIN — ROPINIROLE HYDROCHLORIDE SCH MG: 1 TABLET, FILM COATED ORAL at 07:48

## 2017-09-01 NOTE — PULMONOLOGY PROGRESS NOTE
Pulmonary Progress Note


Date of Service


Sep 1, 2017.





Attending


Dr. Short





Subjective


Patient with COPD on long term O2 therapy (usually 3 L at home), diastolic CHF, 

HTN, hyperlipidemia, and DM2. 





SaO2 this morning has been between 92-95 %. She has been consistently on 3 L. 

Yesterday, she was on 5 L. The patient states that when she got up and went to 

the bathroom this morning, her SaO2 per pulse ox was in the 50% range. She 

still has OMALLEY and SOB while at rest. Denies chest pain, sinus pressure, post 

nasal drip, or mucus production today. She overall does not feel much improved. 

She did get up and walk around with PT yesterday and is hoping to do so again 

today. She states that she did not go very far, but feels she needs 

conditioning. 





Medications reviewed: 


Azithromycin 250 mg, Lasix 20 mg daily, Spironolactone 12.5 mg daily, Symbicort

, Duoneb





Labs reviewed (9/1):


WBC 7.23, Hgb 9.6, Hct 31.1


Na+ 134, K+ 4.4, Cl- 89, Bicarb 39


Creatinine 1.70, BUN 43





Blood cx: NGTD





CXR 8/30: Reviewed and viewed by me


No change in trace b/l pleural effusions and patchy bibasilar densities from 5/ 25/17. Stable cardiomegaly and mild central pulmonary vascular congestion





Previous PFT 6/22: severe obstructive ventilatory disease with FEV1 of 30% 

predicted. Lung volumes showed hyperinflation with residual volume of 160%, and 

DLCO was severely reduced at 30% predicted.





Objective


General: Patient is awake, alert, cooperative, and in no acute distress. Obese.


Head: Normocephalic, Atraumatic.


ENT: PERRLA, No discharge, EOMI, Sclera normal


Neck: Normal ROM. Trachea midline. No stridor


Respiratory: Very slight high-pitched end-expiratory wheeze in b/l upper lobes. 

Normal breath sounds. No respiratory distress. No accessory muscle use.


Cardiovascular: Regular rate and rhythm. Extra beats. No murmur appreciate. 

Normal S1/S2. 


Abdomen: Normal bowel sounds hear throughout. No guarding. Abdomen is soft and 

nontender


Back: Normal inspection. 


Extremities: No edema, cyanosis. Normal ROM


Neuro: Alert, Oriented x 3. CN II-XII grossly intact. Sensation and motor 

function grossly intact.


Psych: Mood and affect are normal. 


Pulses: Dorsal pedal b/l 2+





Assessment & Plan


Acute on chronic hypoxic, hypercapnic respiratory failure


COPD on long-term O2


Diastolic dysfunction with elevated RVSP


Pulmonary HTN


Deconditioning


Uncontrolled DM2 





Patient continues on 3 L via nasal cannula. Desaturation during walk to 

bathroom and back this morning, therefore recommend increasing patient's O2 

supplementation to 5L with ambulation. 


Will require 2 step prior to discharge to check O2 requirements with ambulation.


Continue to monitor O2 and keep SaO2 >92%.


Continue Azithromycin for antiinflammatory properties


Continue Symbicort and Duoneb- likely will add Spiriva to her regimen upon 

discharge


Continue aggressive diuresis- continue to maintain negative fluid balance as 

tolerated


Continue PT/OT- patient likely has large component of deconditioning. Likely 

will benefit from outpatient PT/OT post-D/C as well. May also consider 

pulmonary rehab. 


Continue systemic corticosteroids and then transition to taper.





Data


Medications:





Current Inpatient Medications








 Medications


  (Trade)  Dose


 Ordered  Sig/Roya


 Route  Start Time


 Stop Time Status Last Admin


Dose Admin


 


 Heparin Sodium


  (Porcine)


  (Heparin Sq 5000


 Unit/0.5ml)  5,000 unit  Q8


 SQ  8/30/17 22:00


 9/29/17 21:59  9/1/17 05:37


5,000 UNIT


 


 Acetaminophen


  (Tylenol Tab)  650 mg  Q4H  PRN


 PO  8/30/17 14:00


 9/29/17 13:59   


 


 


 Al Hydrox/Mg


 Hydrox/Simethicone


  (Maalox Max Susp)  15 ml  Q4H  PRN


 PO  8/30/17 14:00


 9/29/17 13:59   


 


 


 Magnesium


 Hydroxide


  (Milk Of


 Magnesia Susp)  30 ml  Q12H  PRN


 PO  8/30/17 14:00


 9/29/17 13:59   


 


 


 Ondansetron HCl


  (Zofran Inj)  4 mg  Q6H  PRN


 IV  8/30/17 14:00


 9/29/17 13:59   


 


 


 Polyethylene


  (Miralax Powder


 Packet)  17 gm  DAILY  PRN


 PO  8/30/17 14:00


 9/29/17 13:59   


 


 


 Albuterol/


 Ipratropium


  (Duoneb)  3 ml  QIDR


 INH  8/30/17 16:00


 9/29/17 15:59  9/1/17 06:52


3 ML


 


 Budesonide/


 Formoterol


 Fumarate


  (Symbicort 160/


 4.5 Inh)  2 puffs  BID


 INH  8/30/17 21:00


 9/29/17 20:59  9/1/17 07:50


2 PUFFS


 


 Cyanocobalamin


  (Vitamin B-12


 Tab)  1,000 mcg  DAILY


 PO  8/31/17 09:00


 9/30/17 08:59  9/1/17 07:48


1,000 MCG


 


 Fluoxetine HCl


  (Prozac Cap)  20 mg  QAM


 PO  8/31/17 09:00


 9/30/17 08:59  9/1/17 07:55


20 MG


 


 Lisinopril


  (Zestril Tab)  5 mg  DAILY


 PO  8/31/17 09:00


 9/30/17 08:59  9/1/17 07:48


5 MG


 


 Metoprolol


 Succinate


  (Toprol Xl Tab)  100 mg  QPM


 PO  8/30/17 21:00


 9/29/17 20:59  8/31/17 20:13


100 MG


 


 Mirabegron


  (Myrbetriq Er)  25 mg  DAILY


 PO  8/31/17 09:00


 9/30/17 08:59  9/1/17 07:49


25 MG


 


 Pravastatin Sodium


  (Pravachol Tab)  10 mg  QPM


 PO  8/30/17 21:00


 9/29/17 20:59  8/31/17 20:12


10 MG


 


 Ropinirole HCl


  (Requip Tab)  2 mg  TID


 PO  8/30/17 21:00


 9/29/17 20:59  9/1/17 07:48


2 MG


 


 Tramadol HCl


  (Ultram Tab)  50 mg  Q6  PRN


 PO  8/30/17 14:00


 9/29/17 13:59   


 


 


 Ferrous Sulfate


  (Feosol Tab)  325 mg  DAILY


 PO  8/31/17 09:00


 9/30/17 08:59  9/1/17 07:48


325 MG


 


 Azithromycin 250


 mg/Dextrose  252.5 ml @ 


 125 mls/hr  Q24H


 IV  8/31/17 12:00


 9/6/17 11:59  8/31/17 12:29


125 MLS/HR


 


 Nystatin


  (Mycostatin Susp)  5 ml  QID


 PO  8/30/17 17:00


 9/9/17 16:59  9/1/17 07:49


5 ML


 


 Insulin Aspart


  (novoLOG ASPART)  **SLIDING


 SCALE**


 **If C...  ACHS


 SC  8/30/17 16:00


 9/29/17 15:59  9/1/17 07:53


16 UNITS


 


 Glucose


  (Glucose 40% Gel)  15-30


 GRAMS 15


 GRAMS...  UD  PRN


 PO  8/30/17 14:30


 9/29/17 14:29   


 


 


 Glucose


  (Glucose Chew


 Tab)  4-8


 Tablets 4


 Tabl...  UD  PRN


 PO  8/30/17 14:30


 9/29/17 14:29   


 


 


 Dextrose


  (Dextrose 50%


 50ML Syringe)  25-50ML OF


 50% DW IV


 FOR...  UD  PRN


 IV  8/30/17 14:30


 9/29/17 14:29   


 


 


 Glucagon


  (Glucagon Inj)  1 mg  UD  PRN


 SQ  8/30/17 14:30


 9/29/17 14:29   


 


 


 Albuterol/


 Ipratropium


  (Duoneb)  3 ml  Q2H  PRN


 INH  8/30/17 17:15


 9/29/17 17:14   


 


 


 Furosemide


  (Lasix Tab)  20 mg  DAILY


 PO  8/31/17 09:00


 9/30/17 08:59  9/1/17 07:50


20 MG


 


 Spironolactone


  (Aldactone Tab)  12.5 mg  QAM


 PO  8/31/17 09:00


 9/30/17 08:59  9/1/17 07:50


12.5 MG


 


 Insulin Glargine


  (Lantus Solostar


 Pen)  16 units  DAILY


 SC  8/31/17 09:00


 9/30/17 08:59  9/1/17 07:54


16 UNITS


 


 Methylprednisolone


 Sodium Succinate


 30 mg/Syringe  0.48 ml @ 


 1.5 mls/min  Q8H


 IV  8/30/17 22:00


 9/29/17 21:59  9/1/17 05:38


1.5 MLS/MIN


 


 Pantoprazole


 Sodium


  (Protonix Tab)  40 mg  QAM


 PO  9/1/17 09:00


 10/1/17 08:59  9/1/17 07:49


40 MG








Vital Signs:











  Date Time  Temp Pulse Resp B/P (MAP) Pulse Ox O2 Delivery O2 Flow Rate FiO2


 


9/1/17 07:39 36.4 69 20 169/95 (119) 94 Nasal Cannula 3.0 


 


9/1/17 06:52  88 16  95 Nasal Cannula 3.0 


 


9/1/17 04:26 37.1 66 16 164/95 (118) 94   


 


9/1/17 04:00      Nasal Cannula 3.0 


 


9/1/17 00:04 36.6 81 18 130/68 (88) 92  3.0 


 


9/1/17 00:00      Nasal Cannula 3.0 


 


8/31/17 20:00      Nasal Cannula 3.0 


 


8/31/17 19:23  85 16  95 Nasal Cannula 3.5 


 


8/31/17 19:10 36.7 77 21 143/82 (102) 91 Nasal Cannula 3.0 


 


8/31/17 16:02 36.7 66 19 145/80 (101) 90 Nasal Cannula 2.0 


 


8/31/17 15:40      Nasal Cannula 2.0 


 


8/31/17 15:27  78 16  91 Nasal Cannula 5.0 


 


8/31/17 12:00     93 Nasal Cannula 2.0 


 


8/31/17 11:19  62 16  97 Nasal Cannula 5.0 


 


8/31/17 11:16 37.0 68 18 139/79 (99) 94  5.0 








Laboratory Results:





Last 24 Hours








Test


  8/31/17


10:04 8/31/17


11:25 8/31/17


15:59 8/31/17


20:01


 


White Blood Count 9.67 K/uL    


 


Red Blood Count 3.08 M/uL    


 


Hemoglobin 9.9 g/dL    


 


Hematocrit 31.5 %    


 


Mean Corpuscular Volume 102.3 fL    


 


Mean Corpuscular Hemoglobin 32.1 pg    


 


Mean Corpuscular Hemoglobin


Concent 31.4 g/dl 


  


  


  


 


 


Platelet Count 163 K/uL    


 


Mean Platelet Volume 10.6 fL    


 


Neutrophils (%) (Auto) 94.6 %    


 


Lymphocytes (%) (Auto) 3.0 %    


 


Monocytes (%) (Auto) 2.1 %    


 


Eosinophils (%) (Auto) 0.0 %    


 


Basophils (%) (Auto) 0.0 %    


 


Neutrophils # (Auto) 9.15 K/uL    


 


Lymphocytes # (Auto) 0.29 K/uL    


 


Monocytes # (Auto) 0.20 K/uL    


 


Eosinophils # (Auto) 0.00 K/uL    


 


Basophils # (Auto) 0.00 K/uL    


 


RDW Standard Deviation 53.5 fL    


 


RDW Coefficient of Variation 14.3 %    


 


Immature Granulocyte % (Auto) 0.3 %    


 


Immature Granulocyte # (Auto) 0.03 K/uL    


 


Sodium Level 138 mmol/L    


 


Potassium Level 4.4 mmol/L    


 


Chloride Level 91 mmol/L    


 


Carbon Dioxide Level 42 mmol/L    


 


Anion Gap 4.0 mmol/L    


 


Blood Urea Nitrogen 30 mg/dl    


 


Creatinine 1.60 mg/dl    


 


Est Creatinine Clear Calc


Drug Dose 34.0 ml/min 


  


  


  


 


 


Estimated GFR (


American) 36.4 


  


  


  


 


 


Estimated GFR (Non-


American 31.4 


  


  


  


 


 


BUN/Creatinine Ratio 19.0    


 


Random Glucose 220 mg/dl    


 


Calcium Level 9.6 mg/dl    


 


Phosphorus Level 2.8 mg/dl    


 


Magnesium Level 2.3 mg/dl    


 


Bedside Glucose  131 mg/dl  265 mg/dl  289 mg/dl 


 


Test


  9/1/17


06:14 


  


  


 


 


White Blood Count 7.23 K/uL    


 


Red Blood Count 3.03 M/uL    


 


Hemoglobin 9.6 g/dL    


 


Hematocrit 31.1 %    


 


Mean Corpuscular Volume 102.6 fL    


 


Mean Corpuscular Hemoglobin 31.7 pg    


 


Mean Corpuscular Hemoglobin


Concent 30.9 g/dl 


  


  


  


 


 


Platelet Count 153 K/uL    


 


Mean Platelet Volume 11.0 fL    


 


Neutrophils (%) (Auto) 91.2 %    


 


Lymphocytes (%) (Auto) 5.9 %    


 


Monocytes (%) (Auto) 2.6 %    


 


Eosinophils (%) (Auto) 0.0 %    


 


Basophils (%) (Auto) 0.0 %    


 


Neutrophils # (Auto) 6.59 K/uL    


 


Lymphocytes # (Auto) 0.43 K/uL    


 


Monocytes # (Auto) 0.19 K/uL    


 


Eosinophils # (Auto) 0.00 K/uL    


 


Basophils # (Auto) 0.00 K/uL    


 


RDW Standard Deviation 54.3 fL    


 


RDW Coefficient of Variation 14.5 %    


 


Immature Granulocyte % (Auto) 0.3 %    


 


Immature Granulocyte # (Auto) 0.02 K/uL    


 


Sodium Level 134 mmol/L    


 


Potassium Level 4.4 mmol/L    


 


Chloride Level 89 mmol/L    


 


Carbon Dioxide Level 39 mmol/L    


 


Anion Gap 6.0 mmol/L    


 


Blood Urea Nitrogen 43 mg/dl    


 


Creatinine 1.70 mg/dl    


 


Est Creatinine Clear Calc


Drug Dose 32.6 ml/min 


  


  


  


 


 


Estimated GFR (


American) 33.8 


  


  


  


 


 


Estimated GFR (Non-


American 29.2 


  


  


  


 


 


BUN/Creatinine Ratio 25.1    


 


Random Glucose 293 mg/dl    


 


Calcium Level 8.7 mg/dl    


 


Phosphorus Level 5.1 mg/dl    


 


Magnesium Level 2.6 mg/dl

## 2017-09-01 NOTE — PROGRESS NOTE
Subjective


Date of Service:


Sep 1, 2017.


Subjective


Pt evaluation today including:  conversation w/ patient, physical exam, chart 

review, lab review, review of studies, conversation w/ consultant, review of 

inpatient medication list


Sitting in bedside, feeling okay, oxygen 2-3 L/m, however when up and walk to 

the restroom this morning possible dropped to 60s,


Denied chest pain, lower extremity still's swollen, reported bilateral lower 

extremity weakness, which is not new





Problem List


Medical Problems:


(1) Cellulitis


Status: Acute  





(2) COPD exacerbation


Status: Acute  





(3) COPD exacerbation


Status: Acute  





(4) COPD exacerbation


Status: Acute  





(5) Hypoxia


Status: Acute  





(6) SOB (shortness of breath)


Status: Acute  








Review of Systems


Constitutional:  + weakness, + fatigue, No fever, No chills, No sweats, No 

weight loss, No problem reported


Eyes:  No worsening of vision, No eye pain, No redness, No discharge, No 

diplopia


ENT:  No hearing loss, No unusual epistaxis, No nasal symptoms, No sore throat, 

No tinnitus, No dental problems, No trouble swallowing


Respiratory:  + cough, + wheezing, + shortness of breath, No sputum, No dyspnea 

on exertion, No dyspnea at rest, No hemoptysis


Cardiac:  No chest pain, No orthopnea, No PND, No edema, No claudication, No 

palpitations


Abdomen:  No pain, No nausea, No vomiting, No diarrhea, No constipation


Musculoskeletal:  No joint pain, No muscle pain, No swelling, No calf pain


Female :  No dysuria, No urinary frequency, No hematuria, No incontinence, No 

abnormal vaginal bleeding, No vaginal discharge


Neurologic:  No memory loss, No paralysis, No weakness, No numbness/tingling, 

No vertigo, No balance problems


Psychiatric:  No depression symptoms, No anhedonism, No anxiety, No insomnia, 

No substance abuse


Heme:  No abnormal bleeding/bruising, No clotting problems, No swollen lymph 

nodes, No night sweats


Endo:  No fatigue, No excessive thirst, No excessive urination


Skin:  No rash, No itch, No new/changing skin lesions, No color change, No 

bleeding





Objective


Vital Signs











  Date Time  Temp Pulse Resp B/P (MAP) Pulse Ox O2 Delivery O2 Flow Rate FiO2


 


9/1/17 07:39 36.4 69 20 169/95 (119) 94 Nasal Cannula 3.0 


 


9/1/17 06:52  88 16  95 Nasal Cannula 3.0 


 


9/1/17 04:26 37.1 66 16 164/95 (118) 94   


 


9/1/17 04:00      Nasal Cannula 3.0 


 


9/1/17 00:04 36.6 81 18 130/68 (88) 92  3.0 


 


9/1/17 00:00      Nasal Cannula 3.0 


 


8/31/17 20:00      Nasal Cannula 3.0 


 


8/31/17 19:23  85 16  95 Nasal Cannula 3.5 


 


8/31/17 19:10 36.7 77 21 143/82 (102) 91 Nasal Cannula 3.0 


 


8/31/17 16:02 36.7 66 19 145/80 (101) 90 Nasal Cannula 2.0 


 


8/31/17 15:40      Nasal Cannula 2.0 


 


8/31/17 15:27  78 16  91 Nasal Cannula 5.0 


 


8/31/17 12:00     93 Nasal Cannula 2.0 


 


8/31/17 11:19  62 16  97 Nasal Cannula 5.0 


 


8/31/17 11:16 37.0 68 18 139/79 (99) 94  5.0 











Physical Exam


General Appearance:  WD/WN, no apparent distress, + obese


Eyes:  normal inspection, PERRL, EOMI, sclerae normal


ENT:  normal ENT inspection, hearing grossly normal, pharynx normal


Neck:  supple, no adenopathy, thyroid normal, no JVD, no carotid bruits, 

trachea midline


Respiratory/Chest:  chest non-tender, normal breath sounds, no respiratory 

distress, no accessory muscle use, + decreased breath sounds, + wheezing (sound

  better than yesterday)


Cardiovascular:  regular rate, rhythm, no edema, no gallop, no JVD, no murmur


Abdomen:  normal bowel sounds, non tender, soft, no organomegaly, no pulsatile 

mass


Extremities:  normal range of motion, non-tender, normal inspection, no pedal 

edema, no calf tenderness, normal capillary refill, pelvis stable


Neurologic/Psychiatric:  CNs II-XII nml as tested, no motor/sensory deficits, 

alert, normal mood/affect, oriented x 3


Skin:  normal color, warm/dry, no rash


Lymphatic:  no adenopathy





Laboratory Results





Last 24 Hours








Test


  8/31/17


10:04 8/31/17


11:25 8/31/17


15:59 8/31/17


20:01


 


White Blood Count 9.67 K/uL    


 


Red Blood Count 3.08 M/uL    


 


Hemoglobin 9.9 g/dL    


 


Hematocrit 31.5 %    


 


Mean Corpuscular Volume 102.3 fL    


 


Mean Corpuscular Hemoglobin 32.1 pg    


 


Mean Corpuscular Hemoglobin


Concent 31.4 g/dl 


  


  


  


 


 


Platelet Count 163 K/uL    


 


Mean Platelet Volume 10.6 fL    


 


Neutrophils (%) (Auto) 94.6 %    


 


Lymphocytes (%) (Auto) 3.0 %    


 


Monocytes (%) (Auto) 2.1 %    


 


Eosinophils (%) (Auto) 0.0 %    


 


Basophils (%) (Auto) 0.0 %    


 


Neutrophils # (Auto) 9.15 K/uL    


 


Lymphocytes # (Auto) 0.29 K/uL    


 


Monocytes # (Auto) 0.20 K/uL    


 


Eosinophils # (Auto) 0.00 K/uL    


 


Basophils # (Auto) 0.00 K/uL    


 


RDW Standard Deviation 53.5 fL    


 


RDW Coefficient of Variation 14.3 %    


 


Immature Granulocyte % (Auto) 0.3 %    


 


Immature Granulocyte # (Auto) 0.03 K/uL    


 


Sodium Level 138 mmol/L    


 


Potassium Level 4.4 mmol/L    


 


Chloride Level 91 mmol/L    


 


Carbon Dioxide Level 42 mmol/L    


 


Anion Gap 4.0 mmol/L    


 


Blood Urea Nitrogen 30 mg/dl    


 


Creatinine 1.60 mg/dl    


 


Est Creatinine Clear Calc


Drug Dose 34.0 ml/min 


  


  


  


 


 


Estimated GFR (


American) 36.4 


  


  


  


 


 


Estimated GFR (Non-


American 31.4 


  


  


  


 


 


BUN/Creatinine Ratio 19.0    


 


Random Glucose 220 mg/dl    


 


Calcium Level 9.6 mg/dl    


 


Phosphorus Level 2.8 mg/dl    


 


Magnesium Level 2.3 mg/dl    


 


Bedside Glucose  131 mg/dl  265 mg/dl  289 mg/dl 


 


Test


  9/1/17


06:14 


  


  


 


 


White Blood Count 7.23 K/uL    


 


Red Blood Count 3.03 M/uL    


 


Hemoglobin 9.6 g/dL    


 


Hematocrit 31.1 %    


 


Mean Corpuscular Volume 102.6 fL    


 


Mean Corpuscular Hemoglobin 31.7 pg    


 


Mean Corpuscular Hemoglobin


Concent 30.9 g/dl 


  


  


  


 


 


Platelet Count 153 K/uL    


 


Mean Platelet Volume 11.0 fL    


 


Neutrophils (%) (Auto) 91.2 %    


 


Lymphocytes (%) (Auto) 5.9 %    


 


Monocytes (%) (Auto) 2.6 %    


 


Eosinophils (%) (Auto) 0.0 %    


 


Basophils (%) (Auto) 0.0 %    


 


Neutrophils # (Auto) 6.59 K/uL    


 


Lymphocytes # (Auto) 0.43 K/uL    


 


Monocytes # (Auto) 0.19 K/uL    


 


Eosinophils # (Auto) 0.00 K/uL    


 


Basophils # (Auto) 0.00 K/uL    


 


RDW Standard Deviation 54.3 fL    


 


RDW Coefficient of Variation 14.5 %    


 


Immature Granulocyte % (Auto) 0.3 %    


 


Immature Granulocyte # (Auto) 0.02 K/uL    


 


Sodium Level 134 mmol/L    


 


Potassium Level 4.4 mmol/L    


 


Chloride Level 89 mmol/L    


 


Carbon Dioxide Level 39 mmol/L    


 


Anion Gap 6.0 mmol/L    


 


Blood Urea Nitrogen 43 mg/dl    


 


Creatinine 1.70 mg/dl    


 


Est Creatinine Clear Calc


Drug Dose 32.6 ml/min 


  


  


  


 


 


Estimated GFR (


American) 33.8 


  


  


  


 


 


Estimated GFR (Non-


American 29.2 


  


  


  


 


 


BUN/Creatinine Ratio 25.1    


 


Random Glucose 293 mg/dl    


 


Calcium Level 8.7 mg/dl    


 


Phosphorus Level 5.1 mg/dl    


 


Magnesium Level 2.6 mg/dl    











Assessment and Plan


72-year-old white female admitted on 08/30/2017 because of worsening difficulty 

breathing,





 Acute on chronic respiratory failure with hypoxia and hypercapnia/COPD 

exacerbation/acute on chronic diastolic CHF exacerbation--


Little better, continue improving





 serial cardiac enzymes were negative, cardiac rhythm monitoring were 

unremarkable 





2-D echocardiogram with Dopplers was done


Results in below


The left ventricle is borderline dilated.


  *  There is mild concentric left ventricular hypertrophy.


  *  Left ventricular systolic function is normal.


  *  Grade I diastolic dysfunction, (abnormal relaxation pattern).


  *  Borderline left atrial enlargement.


  *  There is mild mitral regurgitation.


  *  Right ventricular systolic pressure is elevated at 50-60mmHg.


  *  Compared to an echocardiogram performed in March 2016, there is minimal 

change








Continue Solu-Medrol 60 mg IV every 8 hours.


Pulmonologist input appreciated, continue Zithromax 500 mg IV daily because of 

possible COPD exacerbation and bronchitis


Discontinued  Ceftriaxone 1 g IV


Do nebs every 4 hours while awake and every 2 hours when necessary.


Baseline nasal cannula 3 L O2 with chronic O2 dependent


Possible need to increase oxygen level in 3-4 L per min   when up and when up 

and walk, discussed with patient about this


Received Lasix 40 mg IV 1 in the ED.


Resume Lasix 20 mg by mouth daily Aldactone 25 mg by mouth daily tomorrow.


Continue lisinopril 5 mg by mouth daily and metoprolol succinate 100 mg by 

mouth daily.





Diabetes mellitus--hold glipizide.,  Continue sliding scale





Hyperlipidemia--continue pravastatin 10 mg by mouth daily.


Possible Deconditioning--consult physical therapy/occupational therapy.





Moderate obesity





GI and DVT prophylaxis is covered





Possible discharge day 1 or 2 if continued to improve


Continued [facility] stay due to:  multiple IV medications needed


Discharge planning:  home

## 2017-09-02 VITALS — OXYGEN SATURATION: 93 %

## 2017-09-02 VITALS
HEART RATE: 69 BPM | SYSTOLIC BLOOD PRESSURE: 163 MMHG | OXYGEN SATURATION: 91 % | TEMPERATURE: 98.06 F | DIASTOLIC BLOOD PRESSURE: 83 MMHG

## 2017-09-02 VITALS
OXYGEN SATURATION: 97 % | DIASTOLIC BLOOD PRESSURE: 99 MMHG | TEMPERATURE: 97.52 F | SYSTOLIC BLOOD PRESSURE: 181 MMHG | HEART RATE: 70 BPM

## 2017-09-02 VITALS
OXYGEN SATURATION: 94 % | SYSTOLIC BLOOD PRESSURE: 159 MMHG | TEMPERATURE: 97.34 F | DIASTOLIC BLOOD PRESSURE: 85 MMHG | HEART RATE: 66 BPM

## 2017-09-02 VITALS
HEART RATE: 68 BPM | DIASTOLIC BLOOD PRESSURE: 97 MMHG | SYSTOLIC BLOOD PRESSURE: 178 MMHG | OXYGEN SATURATION: 96 % | TEMPERATURE: 98.06 F

## 2017-09-02 VITALS
DIASTOLIC BLOOD PRESSURE: 97 MMHG | SYSTOLIC BLOOD PRESSURE: 178 MMHG | TEMPERATURE: 98.06 F | HEART RATE: 78 BPM | OXYGEN SATURATION: 93 %

## 2017-09-02 VITALS — HEART RATE: 70 BPM | OXYGEN SATURATION: 93 %

## 2017-09-02 VITALS — OXYGEN SATURATION: 93 % | HEART RATE: 78 BPM

## 2017-09-02 VITALS — HEART RATE: 61 BPM | OXYGEN SATURATION: 95 %

## 2017-09-02 VITALS — OXYGEN SATURATION: 100 % | HEART RATE: 69 BPM

## 2017-09-02 LAB
ANION GAP SERPL CALC-SCNC: 3 MMOL/L (ref 3–11)
BUN SERPL-MCNC: 51 MG/DL (ref 7–18)
BUN/CREAT SERPL: 29.8 (ref 10–20)
CALCIUM SERPL-MCNC: 8.7 MG/DL (ref 8.5–10.1)
CHLORIDE SERPL-SCNC: 92 MMOL/L (ref 98–107)
CO2 SERPL-SCNC: 40 MMOL/L (ref 21–32)
CREAT CL PREDICTED SERPL C-G-VRATE: 32.5 ML/MIN
CREAT SERPL-MCNC: 1.7 MG/DL (ref 0.6–1.2)
EOSINOPHIL NFR BLD AUTO: 123 K/UL (ref 130–400)
GLUCOSE SERPL-MCNC: 228 MG/DL (ref 70–99)
HCT VFR BLD CALC: 31.6 % (ref 37–47)
MAGNESIUM SERPL-MCNC: 2.7 MG/DL (ref 1.8–2.4)
MCH RBC QN AUTO: 31.4 PG (ref 25–34)
MCHC RBC AUTO-ENTMCNC: 31.3 G/DL (ref 32–36)
MCV RBC AUTO: 100.3 FL (ref 80–100)
PMV BLD AUTO: 11.5 FL (ref 7.4–10.4)
POTASSIUM SERPL-SCNC: 4.4 MMOL/L (ref 3.5–5.1)
RBC # BLD AUTO: 3.15 M/UL (ref 4.2–5.4)
SODIUM SERPL-SCNC: 135 MMOL/L (ref 136–145)
WBC # BLD AUTO: 5.17 K/UL (ref 4.8–10.8)

## 2017-09-02 RX ADMIN — Medication SCH MCG: at 08:02

## 2017-09-02 RX ADMIN — INSULIN ASPART SCH UNITS: 100 INJECTION, SOLUTION INTRAVENOUS; SUBCUTANEOUS at 08:00

## 2017-09-02 RX ADMIN — NYSTATIN SCH ML: 100000 SUSPENSION ORAL at 21:49

## 2017-09-02 RX ADMIN — FLUOXETINE SCH MG: 20 CAPSULE ORAL at 08:03

## 2017-09-02 RX ADMIN — INSULIN ASPART SCH UNITS: 100 INJECTION, SOLUTION INTRAVENOUS; SUBCUTANEOUS at 21:52

## 2017-09-02 RX ADMIN — BUDESONIDE AND FORMOTEROL FUMARATE DIHYDRATE SCH PUFFS: 160; 4.5 AEROSOL RESPIRATORY (INHALATION) at 08:01

## 2017-09-02 RX ADMIN — SPIRONOLACTONE SCH MG: 25 TABLET, FILM COATED ORAL at 08:03

## 2017-09-02 RX ADMIN — ROPINIROLE HYDROCHLORIDE SCH MG: 1 TABLET, FILM COATED ORAL at 21:50

## 2017-09-02 RX ADMIN — NYSTATIN SCH ML: 100000 SUSPENSION ORAL at 17:50

## 2017-09-02 RX ADMIN — FERROUS SULFATE TAB EC 325 MG (65 MG FE EQUIVALENT) SCH MG: 325 (65 FE) TABLET DELAYED RESPONSE at 08:02

## 2017-09-02 RX ADMIN — ROPINIROLE HYDROCHLORIDE SCH MG: 1 TABLET, FILM COATED ORAL at 08:02

## 2017-09-02 RX ADMIN — ROPINIROLE HYDROCHLORIDE SCH MG: 1 TABLET, FILM COATED ORAL at 13:46

## 2017-09-02 RX ADMIN — MIRABEGRON SCH MG: 25 TABLET, FILM COATED, EXTENDED RELEASE ORAL at 08:03

## 2017-09-02 RX ADMIN — IPRATROPIUM BROMIDE AND ALBUTEROL SULFATE SCH ML: .5; 3 SOLUTION RESPIRATORY (INHALATION) at 11:18

## 2017-09-02 RX ADMIN — HEPARIN SODIUM SCH UNIT: 10000 INJECTION, SOLUTION INTRAVENOUS; SUBCUTANEOUS at 21:54

## 2017-09-02 RX ADMIN — INSULIN ASPART SCH UNITS: 100 INJECTION, SOLUTION INTRAVENOUS; SUBCUTANEOUS at 11:51

## 2017-09-02 RX ADMIN — BUDESONIDE AND FORMOTEROL FUMARATE DIHYDRATE SCH PUFFS: 160; 4.5 AEROSOL RESPIRATORY (INHALATION) at 21:48

## 2017-09-02 RX ADMIN — HEPARIN SODIUM SCH UNIT: 10000 INJECTION, SOLUTION INTRAVENOUS; SUBCUTANEOUS at 05:51

## 2017-09-02 RX ADMIN — NYSTATIN SCH ML: 100000 SUSPENSION ORAL at 11:52

## 2017-09-02 RX ADMIN — NYSTATIN SCH ML: 100000 SUSPENSION ORAL at 08:04

## 2017-09-02 RX ADMIN — IPRATROPIUM BROMIDE AND ALBUTEROL SULFATE SCH ML: .5; 3 SOLUTION RESPIRATORY (INHALATION) at 07:24

## 2017-09-02 RX ADMIN — PRAVASTATIN SODIUM SCH MG: 10 TABLET ORAL at 21:51

## 2017-09-02 RX ADMIN — PANTOPRAZOLE SCH MG: 40 TABLET, DELAYED RELEASE ORAL at 08:03

## 2017-09-02 RX ADMIN — LISINOPRIL SCH MG: 5 TABLET ORAL at 08:02

## 2017-09-02 RX ADMIN — IPRATROPIUM BROMIDE AND ALBUTEROL SULFATE SCH ML: .5; 3 SOLUTION RESPIRATORY (INHALATION) at 19:01

## 2017-09-02 RX ADMIN — FUROSEMIDE SCH MG: 20 TABLET ORAL at 08:04

## 2017-09-02 RX ADMIN — AZITHROMYCIN MONOHYDRATE SCH MLS/HR: 500 INJECTION, POWDER, LYOPHILIZED, FOR SOLUTION INTRAVENOUS at 11:52

## 2017-09-02 RX ADMIN — METHYLPREDNISOLONE SODIUM SUCCINATE SCH MLS/MIN: 1 INJECTION, POWDER, FOR SOLUTION INTRAMUSCULAR; INTRAVENOUS at 05:49

## 2017-09-02 RX ADMIN — IPRATROPIUM BROMIDE AND ALBUTEROL SULFATE SCH ML: .5; 3 SOLUTION RESPIRATORY (INHALATION) at 14:31

## 2017-09-02 RX ADMIN — INSULIN GLARGINE SCH UNITS: 100 INJECTION, SOLUTION SUBCUTANEOUS at 08:01

## 2017-09-02 RX ADMIN — METOPROLOL SUCCINATE SCH MG: 50 TABLET, EXTENDED RELEASE ORAL at 21:50

## 2017-09-02 RX ADMIN — INSULIN ASPART SCH UNITS: 100 INJECTION, SOLUTION INTRAVENOUS; SUBCUTANEOUS at 17:46

## 2017-09-02 RX ADMIN — HEPARIN SODIUM SCH UNIT: 10000 INJECTION, SOLUTION INTRAVENOUS; SUBCUTANEOUS at 13:48

## 2017-09-02 NOTE — PROGRESS NOTE
Subjective


Date of Service:


Sep 2, 2017.


Subjective


Pt evaluation today including:  conversation w/ patient, physical exam, chart 

review, lab review, review of studies, conversation w/ consultant, review of 

inpatient medication list


Complaining about gaining weight, still sob, and wheezing





Problem List


Medical Problems:


(1) Cellulitis


Status: Acute  





(2) COPD exacerbation


Status: Acute  





(3) COPD exacerbation


Status: Acute  





(4) COPD exacerbation


Status: Acute  





(5) Hypoxia


Status: Acute  





(6) SOB (shortness of breath)


Status: Acute  








Review of Systems


Constitutional:  + fatigue, No fever, No chills, No sweats, No weight loss, No 

weakness, No problem reported


Eyes:  No worsening of vision, No eye pain, No redness, No discharge, No 

diplopia


ENT:  No hearing loss, No unusual epistaxis, No nasal symptoms, No sore throat, 

No tinnitus, No dental problems, No trouble swallowing


Respiratory:  + cough, + wheezing, No sputum, No shortness of breath, No 

dyspnea on exertion, No dyspnea at rest, No hemoptysis


Cardiac:  + edema, No chest pain, No orthopnea, No PND, No claudication, No 

palpitations


Abdomen:  No pain, No nausea, No vomiting, No diarrhea, No constipation


Musculoskeletal:  No joint pain, No muscle pain, No swelling, No calf pain


Female :  No dysuria, No urinary frequency, No hematuria, No incontinence, No 

abnormal vaginal bleeding, No vaginal discharge


Neurologic:  No memory loss, No paralysis, No weakness, No numbness/tingling, 

No vertigo, No balance problems


Psychiatric:  No depression symptoms, No anhedonism, No anxiety, No insomnia, 

No substance abuse


Heme:  No abnormal bleeding/bruising, No clotting problems, No swollen lymph 

nodes, No night sweats


Endo:  No fatigue, No excessive thirst, No excessive urination


Skin:  No rash, No itch, No new/changing skin lesions, No color change, No 

bleeding





Objective


Vital Signs











  Date Time  Temp Pulse Resp B/P (MAP) Pulse Ox O2 Delivery O2 Flow Rate FiO2


 


9/2/17 15:20     93 Nasal Cannula 3.0 


 


9/2/17 14:53 36.7 78 16  93  3.0 


 


9/2/17 14:31  78 16  93 Nasal Cannula 3.0 


 


9/2/17 12:33 36.7 68 22 178/97 (124) 96 Nasal Cannula 4.0 


 


9/2/17 12:00     93 Nasal Cannula 2.0 


 


9/2/17 11:18  61 16  95 Nasal Cannula 3.0 


 


9/2/17 08:09 36.4 70 20 181/99 (126) 97 Nasal Cannula 3.0 


 


9/2/17 08:00     93 Nasal Cannula 2.0 


 


9/2/17 07:24  69 16  100 Nasal Cannula 3.0 


 


9/2/17 04:00      Nasal Cannula 3.0 


 


9/2/17 03:15 36.3 66 20 159/85 (109) 94 Nasal Cannula 3.0 





      Humidified Oxygen  


 


9/2/17 00:01      Nasal Cannula 3.0 


 


9/1/17 23:02 36.7 67 20 176/99 (124) 95 Nasal Cannula 3.0 


 


9/1/17 20:00      Nasal Cannula 3.0 


 


9/1/17 19:59  81 16  97 Nasal Cannula 3.0 


 


9/1/17 19:36 36.7 64 20 161/90 (113) 90 Nasal Cannula 3.0 











Physical Exam


General Appearance:  WD/WN, no apparent distress, + obese


Eyes:  normal inspection, PERRL, EOMI, sclerae normal


ENT:  normal ENT inspection, hearing grossly normal, pharynx normal


Neck:  supple, no adenopathy, thyroid normal, no JVD, no carotid bruits, 

trachea midline


Respiratory/Chest:  chest non-tender, normal breath sounds, no respiratory 

distress, no accessory muscle use, + decreased breath sounds, + wheezing


Cardiovascular:  regular rate, rhythm, no gallop, no JVD, no murmur, + 

pertinent finding (trace to 1+ edema)


Abdomen:  normal bowel sounds, non tender, soft, no organomegaly, no pulsatile 

mass


Extremities:  normal range of motion, non-tender, normal inspection, no pedal 

edema, no calf tenderness, normal capillary refill, pelvis stable


Neurologic/Psychiatric:  CNs II-XII nml as tested, no motor/sensory deficits, 

alert, normal mood/affect, oriented x 3


Skin:  normal color, warm/dry, no rash


Lymphatic:  no adenopathy





Laboratory Results





Last 24 Hours








Test


  9/1/17


20:13 9/2/17


06:40 9/2/17


06:43 9/2/17


11:27


 


Bedside Glucose 79 mg/dl  228 mg/dl   347 mg/dl 


 


White Blood Count   5.17 K/uL  


 


Red Blood Count   3.15 M/uL  


 


Hemoglobin   9.9 g/dL  


 


Hematocrit   31.6 %  


 


Mean Corpuscular Volume   100.3 fL  


 


Mean Corpuscular Hemoglobin   31.4 pg  


 


Mean Corpuscular Hemoglobin


Concent 


  


  31.3 g/dl 


  


 


 


RDW Standard Deviation   52.0 fL  


 


RDW Coefficient of Variation   14.2 %  


 


Platelet Count   123 K/uL  


 


Mean Platelet Volume   11.5 fL  


 


Sodium Level   135 mmol/L  


 


Potassium Level   4.4 mmol/L  


 


Chloride Level   92 mmol/L  


 


Carbon Dioxide Level   40 mmol/L  


 


Anion Gap   3.0 mmol/L  


 


Blood Urea Nitrogen   51 mg/dl  


 


Creatinine   1.70 mg/dl  


 


Est Creatinine Clear Calc


Drug Dose 


  


  32.5 ml/min 


  


 


 


Estimated GFR (


American) 


  


  33.8 


  


 


 


Estimated GFR (Non-


American 


  


  29.2 


  


 


 


BUN/Creatinine Ratio   29.8  


 


Random Glucose   228 mg/dl  


 


Calcium Level   8.7 mg/dl  


 


Magnesium Level   2.7 mg/dl  


 


Test


  9/2/17


16:11 


  


  


 


 


Bedside Glucose 252 mg/dl    











Assessment and Plan


72-year-old white female admitted on 08/30/2017 because of worsening difficulty 

breathing,


Acute on chronic respiratory failure with hypoxia and hypercapnia/COPD 

exacerbation/acute on chronic diastolic CHF exacerbation--


Little better, continue improving





 serial cardiac enzymes were negative, cardiac rhythm monitoring were 

unremarkable 





2-D echocardiogram with Dopplers was done


Results in below


The left ventricle is borderline dilated.


  *  There is mild concentric left ventricular hypertrophy.


  *  Left ventricular systolic function is normal.


  *  Grade I diastolic dysfunction, (abnormal relaxation pattern).


  *  Borderline left atrial enlargement.


  *  There is mild mitral regurgitation.


  *  Right ventricular systolic pressure is elevated at 50-60mmHg.


  *  Compared to an echocardiogram performed in March 2016, there is minimal 

change








 Has been on  Solu-Medrol 60 mg IV every 8 hours, will change to 40 every 12 

because of patient's condition has some improve


Pulmonologist input appreciated, continue Zithromax 500 mg IV daily because of 

possible COPD exacerbation and bronchitis, change azithromycin to by mouth


Discontinued  Ceftriaxone 1 g IV


Do nebs every 4 hours while awake and every 2 hours when necessary.


Baseline nasal cannula 3 L O2 with chronic O2 dependent, is not approaching in 

baseline


Possible need to increase oxygen level in 3-4 L per min   when up and when up 

and walk, discussed with patient about this


Received Lasix 40 mg IV 1 in the ED.


Increase Lasix  to 40  mg by mouth daily Aldactone 25 mg by mouth daily  

because of complaining about weight gain, 


Discussed the risk of possible worsening renal function, she understand and 

agreed to take the risk


Continue lisinopril 5 mg by mouth daily and metoprolol succinate 100 mg by 

mouth daily.





Diabetes mellitus--hold glipizide.,  Continue sliding scale





Hyperlipidemia--continue pravastatin 10 mg by mouth daily.


Possible Deconditioning--consult physical therapy/occupational therapy.





Moderate obesity





GI and DVT prophylaxis is covered





Possible discharge day 1 or 2 if continued to improve


Continued [facility] stay due to:  multiple IV medications needed


Discharge planning:  home

## 2017-09-02 NOTE — PULMONOLOGY PROGRESS NOTE
Pulmonary Progress Note


Date of Service


Sep 2, 2017.





Attending


Dr. Short





Subjective


Patient seen and examined this morning.


She is sitting the edge of bed. She states that her breathing feel better. She 

feels less short of breath and was able to ambulate in the halls with the 

nurse. She is complaining of bilateral lower extremity swelling.





Objective





General: Patient is awake, alert, cooperative, and in no acute distress. Obese.


Head: Normocephalic, Atraumatic.


ENT: PERRLA, No discharge, EOMI, Sclera normal


Neck: Normal ROM. Trachea midline. No stridor


Respiratory: Very slight high-pitched end-expiratory wheeze in b/l upper lobes. 

Normal breath sounds. No respiratory distress. No accessory muscle use.


Cardiovascular: Regular rate and rhythm. Extra beats. No murmur appreciate. 

Normal S1/S2. 


Abdomen: Normal bowel sounds hear throughout. No guarding. Abdomen is soft and 

nontender


Back: Normal inspection. 


Extremities: No edema, cyanosis. Normal ROM


Neuro: Alert, Oriented x 3. CN II-XII grossly intact. Sensation and motor 

function grossly intact.


Psych: Mood and affect are normal. 


Pulses: Dorsal pedal b/l 2+








Last 24 Hours








Test


  9/1/17


11:20 9/1/17


16:09 9/1/17


20:13 9/2/17


06:40


 


Bedside Glucose 292 mg/dl  225 mg/dl  79 mg/dl  228 mg/dl 


 


Test


  9/2/17


06:43 


  


  


 


 


White Blood Count 5.17 K/uL    


 


Red Blood Count 3.15 M/uL    


 


Hemoglobin 9.9 g/dL    


 


Hematocrit 31.6 %    


 


Mean Corpuscular Volume 100.3 fL    


 


Mean Corpuscular Hemoglobin 31.4 pg    


 


Mean Corpuscular Hemoglobin


Concent 31.3 g/dl 


  


  


  


 


 


RDW Standard Deviation 52.0 fL    


 


RDW Coefficient of Variation 14.2 %    


 


Platelet Count 123 K/uL    


 


Mean Platelet Volume 11.5 fL    


 


Sodium Level 135 mmol/L    


 


Potassium Level 4.4 mmol/L    


 


Chloride Level 92 mmol/L    


 


Carbon Dioxide Level 40 mmol/L    


 


Anion Gap 3.0 mmol/L    


 


Blood Urea Nitrogen 51 mg/dl    


 


Creatinine 1.70 mg/dl    


 


Est Creatinine Clear Calc


Drug Dose 32.5 ml/min 


  


  


  


 


 


Estimated GFR (


American) 33.8 


  


  


  


 


 


Estimated GFR (Non-


American 29.2 


  


  


  


 


 


BUN/Creatinine Ratio 29.8    


 


Random Glucose 228 mg/dl    


 


Calcium Level 8.7 mg/dl    


 


Magnesium Level 2.7 mg/dl    











Assessment & Plan


Acute on chronic hypoxic, hypercapnic respiratory failure


COPD on long-term O2


Diastolic dysfunction with elevated RVSP


Pulmonary HTN


Deconditioning


Uncontrolled DM2 


Acute on chronic renal failure





Patient continues on 3 L via nasal cannula. Recommend increasing patient's O2 

supplementation to 5L with ambulation. 


Will require 2 step prior to discharge to check O2 requirements with ambulation.


Continue to monitor O2 and keep SaO2 >92%.


Continue Azithromycin for antiinflammatory properties


Continue Symbicort and Duoneb- likely will add Spiriva to her regimen upon 

discharge


Continue aggressive diuresis- continue to maintain negative fluid balance as 

tolerated


She increased creatinine 1.7 for last two days. Currently on Lasix and 

Spironolactone.


Continue PT/OT- patient likely has large component of deconditioning. Likely 

will benefit from outpatient PT/OT post-D/C as well. May also consider 

pulmonary rehab. 


Continue systemic corticosteroids and then transition to taper.





Data


Medications:





Current Inpatient Medications








 Medications


  (Trade)  Dose


 Ordered  Sig/Roya


 Route  Start Time


 Stop Time Status Last Admin


Dose Admin


 


 Heparin Sodium


  (Porcine)


  (Heparin Sq 5000


 Unit/0.5ml)  5,000 unit  Q8


 SQ  8/30/17 22:00


 9/29/17 21:59  9/2/17 05:51


5,000 UNIT


 


 Acetaminophen


  (Tylenol Tab)  650 mg  Q4H  PRN


 PO  8/30/17 14:00


 9/29/17 13:59   


 


 


 Al Hydrox/Mg


 Hydrox/Simethicone


  (Maalox Max Susp)  15 ml  Q4H  PRN


 PO  8/30/17 14:00


 9/29/17 13:59   


 


 


 Magnesium


 Hydroxide


  (Milk Of


 Magnesia Susp)  30 ml  Q12H  PRN


 PO  8/30/17 14:00


 9/29/17 13:59   


 


 


 Ondansetron HCl


  (Zofran Inj)  4 mg  Q6H  PRN


 IV  8/30/17 14:00


 9/29/17 13:59   


 


 


 Polyethylene


  (Miralax Powder


 Packet)  17 gm  DAILY  PRN


 PO  8/30/17 14:00


 9/29/17 13:59   


 


 


 Albuterol/


 Ipratropium


  (Duoneb)  3 ml  QIDR


 INH  8/30/17 16:00


 9/29/17 15:59  9/2/17 07:24


3 ML


 


 Budesonide/


 Formoterol


 Fumarate


  (Symbicort 160/


 4.5 Inh)  2 puffs  BID


 INH  8/30/17 21:00


 9/29/17 20:59  9/2/17 08:01


2 PUFFS


 


 Cyanocobalamin


  (Vitamin B-12


 Tab)  1,000 mcg  DAILY


 PO  8/31/17 09:00


 9/30/17 08:59  9/2/17 08:02


1,000 MCG


 


 Fluoxetine HCl


  (Prozac Cap)  20 mg  QAM


 PO  8/31/17 09:00


 9/30/17 08:59  9/2/17 08:03


20 MG


 


 Lisinopril


  (Zestril Tab)  5 mg  DAILY


 PO  8/31/17 09:00


 9/30/17 08:59  9/2/17 08:02


5 MG


 


 Metoprolol


 Succinate


  (Toprol Xl Tab)  100 mg  QPM


 PO  8/30/17 21:00


 9/29/17 20:59  9/1/17 20:18


100 MG


 


 Mirabegron


  (Myrbetriq Er)  25 mg  DAILY


 PO  8/31/17 09:00


 9/30/17 08:59  9/2/17 08:03


25 MG


 


 Pravastatin Sodium


  (Pravachol Tab)  10 mg  QPM


 PO  8/30/17 21:00


 9/29/17 20:59  9/1/17 20:18


10 MG


 


 Ropinirole HCl


  (Requip Tab)  2 mg  TID


 PO  8/30/17 21:00


 9/29/17 20:59  9/2/17 08:02


2 MG


 


 Tramadol HCl


  (Ultram Tab)  50 mg  Q6  PRN


 PO  8/30/17 14:00


 9/29/17 13:59   


 


 


 Ferrous Sulfate


  (Feosol Tab)  325 mg  DAILY


 PO  8/31/17 09:00


 9/30/17 08:59  9/2/17 08:02


325 MG


 


 Azithromycin 250


 mg/Dextrose  252.5 ml @ 


 125 mls/hr  Q24H


 IV  8/31/17 12:00


 9/6/17 11:59  9/1/17 13:20


125 MLS/HR


 


 Nystatin


  (Mycostatin Susp)  5 ml  QID


 PO  8/30/17 17:00


 9/9/17 16:59  9/2/17 08:04


5 ML


 


 Insulin Aspart


  (novoLOG ASPART)  **SLIDING


 SCALE**


 **If C...  ACHS


 SC  8/30/17 16:00


 9/29/17 15:59  9/2/17 08:00


7 UNITS


 


 Glucose


  (Glucose 40% Gel)  15-30


 GRAMS 15


 GRAMS...  UD  PRN


 PO  8/30/17 14:30


 9/29/17 14:29   


 


 


 Glucose


  (Glucose Chew


 Tab)  4-8


 Tablets 4


 Tabl...  UD  PRN


 PO  8/30/17 14:30


 9/29/17 14:29   


 


 


 Dextrose


  (Dextrose 50%


 50ML Syringe)  25-50ML OF


 50% DW IV


 FOR...  UD  PRN


 IV  8/30/17 14:30


 9/29/17 14:29   


 


 


 Glucagon


  (Glucagon Inj)  1 mg  UD  PRN


 SQ  8/30/17 14:30


 9/29/17 14:29   


 


 


 Albuterol/


 Ipratropium


  (Duoneb)  3 ml  Q2H  PRN


 INH  8/30/17 17:15


 9/29/17 17:14   


 


 


 Furosemide


  (Lasix Tab)  20 mg  DAILY


 PO  8/31/17 09:00


 9/30/17 08:59  9/2/17 08:04


20 MG


 


 Spironolactone


  (Aldactone Tab)  12.5 mg  QAM


 PO  8/31/17 09:00


 9/30/17 08:59  9/2/17 08:03


12.5 MG


 


 Insulin Glargine


  (Lantus Solostar


 Pen)  16 units  DAILY


 SC  8/31/17 09:00


 9/30/17 08:59  9/2/17 08:01


16 UNITS


 


 Methylprednisolone


 Sodium Succinate


 30 mg/Syringe  0.48 ml @ 


 1.5 mls/min  Q8H


 IV  8/30/17 22:00


 9/29/17 21:59  9/2/17 05:49


1.5 MLS/MIN


 


 Pantoprazole


 Sodium


  (Protonix Tab)  40 mg  QAM


 PO  9/1/17 09:00


 10/1/17 08:59  9/2/17 08:03


40 MG








I & O:











Test


  8/30/17


12:13 8/30/17


19:59 8/31/17


10:04 9/1/17


06:14


 


Venous Blood pH 7.35    


 


Venous Blood Partial Pressure


CO2 86 


  


  


  


 


 


Venous Blood Partial Pressure


O2 36 


  


  


  


 


 


Venous Blood HCO3 46    


 


Venous Blood Oxygen Saturation 60.8    


 


Venous Blood Base Excess 17.6    


 


Troponin I < 0.015  < 0.015   


 


Pro-B-Type Natriuretic Peptide 2528    


 


Immature Granulocyte % (Auto)   0.3  0.3 


 


White Blood Count   9.67  7.23 


 


Red Blood Count   3.08  3.03 


 


Hemoglobin   9.9  9.6 


 


Hematocrit   31.5  31.1 


 


Mean Corpuscular Volume   102.3  102.6 


 


Mean Corpuscular Hemoglobin   32.1  31.7 


 


Mean Corpuscular Hemoglobin


Concent 


  


  31.4 


  30.9 


 


 


Platelet Count   163  153 


 


Mean Platelet Volume   10.6  11.0 


 


Neutrophils (%) (Auto)   94.6  91.2 


 


Lymphocytes (%) (Auto)   3.0  5.9 


 


Monocytes (%) (Auto)   2.1  2.6 


 


Eosinophils (%) (Auto)   0.0  0.0 


 


Basophils (%) (Auto)   0.0  0.0 


 


Neutrophils # (Auto)   9.15  6.59 


 


Lymphocytes # (Auto)   0.29  0.43 


 


Monocytes # (Auto)   0.20  0.19 


 


Eosinophils # (Auto)   0.00  0.00 


 


Basophils # (Auto)   0.00  0.00 


 


Immature Granulocyte # (Auto)   0.03  0.02 


 


Phosphorus Level   2.8  5.1 


 


RDW Standard Deviation    54.3 


 


RDW Coefficient of Variation    14.5 


 


Sodium Level    134 


 


Potassium Level    4.4 


 


Chloride Level    89 


 


Carbon Dioxide Level    39 


 


Anion Gap    6.0 


 


Blood Urea Nitrogen    43 


 


Creatinine    1.70 


 


Est Creatinine Clear Calc


Drug Dose 


  


  


  32.6 


 


 


Estimated GFR (


American) 


  


  


  33.8 


 


 


Estimated GFR (Non-


American 


  


  


  29.2 


 


 


BUN/Creatinine Ratio    25.1 


 


Random Glucose    293 


 


Calcium Level    8.7 


 


Magnesium Level    2.6 


 


Test


  9/1/17


20:13 9/2/17


06:40 9/2/17


06:43 


 


 


POC Glucose 79  228   


 


White Blood Count   5.17  


 


Red Blood Count   3.15  


 


Hemoglobin   9.9  


 


Hematocrit   31.6  


 


Mean Corpuscular Volume   100.3  


 


Mean Corpuscular Hemoglobin   31.4  


 


Mean Corpuscular Hemoglobin


Concent 


  


  31.3 


  


 


 


RDW Standard Deviation   52.0  


 


RDW Coefficient of Variation   14.2  


 


Platelet Count   123  


 


Mean Platelet Volume   11.5  


 


Sodium Level   135  


 


Potassium Level   4.4  


 


Chloride Level   92  


 


Carbon Dioxide Level   40  


 


Anion Gap   3.0  


 


Blood Urea Nitrogen   51  


 


Creatinine   1.70  


 


Est Creatinine Clear Calc


Drug Dose 


  


  32.5 


  


 


 


Estimated GFR (


American) 


  


  33.8 


  


 


 


Estimated GFR (Non-


American 


  


  29.2 


  


 


 


BUN/Creatinine Ratio   29.8  


 


Random Glucose   228  


 


Calcium Level   8.7  


 


Magnesium Level   2.7  








Vital Signs:











  Date Time  Temp Pulse Resp B/P (MAP) Pulse Ox O2 Delivery O2 Flow Rate FiO2


 


9/2/17 08:09 36.4 70 20 181/99 (126) 97 Nasal Cannula 3.0 


 


9/2/17 08:00     93 Nasal Cannula 2.0 


 


9/2/17 07:24  69 16  100 Nasal Cannula 3.0 


 


9/2/17 04:00      Nasal Cannula 3.0 


 


9/2/17 03:15 36.3 66 20 159/85 (109) 94 Nasal Cannula 3.0 





      Humidified Oxygen  


 


9/2/17 00:01      Nasal Cannula 3.0 


 


9/1/17 23:02 36.7 67 20 176/99 (124) 95 Nasal Cannula 3.0 


 


9/1/17 20:00      Nasal Cannula 3.0 


 


9/1/17 19:59  81 16  97 Nasal Cannula 3.0 


 


9/1/17 19:36 36.7 64 20 161/90 (113) 90 Nasal Cannula 3.0 


 


9/1/17 16:00     93 Nasal Cannula 2.0 


 


9/1/17 15:58  76 16  97 Nasal Cannula 3.0 


 


9/1/17 15:13 36.4 64 20 151/83 (105) 93 Nasal Cannula 3.0 


 


9/1/17 12:11 36.4 63 20 162/81 (108) 91 Nasal Cannula 3.0 


 


9/1/17 12:00     93 Nasal Cannula 2.0 


 


9/1/17 11:19  65 16  96 Nasal Cannula 3.0 








Laboratory Results:





Last 24 Hours








Test


  9/1/17


11:20 9/1/17


16:09 9/1/17


20:13 9/2/17


06:40


 


Bedside Glucose 292 mg/dl  225 mg/dl  79 mg/dl  228 mg/dl 


 


Test


  9/2/17


06:43 


  


  


 


 


White Blood Count 5.17 K/uL    


 


Red Blood Count 3.15 M/uL    


 


Hemoglobin 9.9 g/dL    


 


Hematocrit 31.6 %    


 


Mean Corpuscular Volume 100.3 fL    


 


Mean Corpuscular Hemoglobin 31.4 pg    


 


Mean Corpuscular Hemoglobin


Concent 31.3 g/dl 


  


  


  


 


 


RDW Standard Deviation 52.0 fL    


 


RDW Coefficient of Variation 14.2 %    


 


Platelet Count 123 K/uL    


 


Mean Platelet Volume 11.5 fL    


 


Sodium Level 135 mmol/L    


 


Potassium Level 4.4 mmol/L    


 


Chloride Level 92 mmol/L    


 


Carbon Dioxide Level 40 mmol/L    


 


Anion Gap 3.0 mmol/L    


 


Blood Urea Nitrogen 51 mg/dl    


 


Creatinine 1.70 mg/dl    


 


Est Creatinine Clear Calc


Drug Dose 32.5 ml/min 


  


  


  


 


 


Estimated GFR (


American) 33.8 


  


  


  


 


 


Estimated GFR (Non-


American 29.2 


  


  


  


 


 


BUN/Creatinine Ratio 29.8    


 


Random Glucose 228 mg/dl    


 


Calcium Level 8.7 mg/dl    


 


Magnesium Level 2.7 mg/dl

## 2017-09-03 VITALS
OXYGEN SATURATION: 94 % | SYSTOLIC BLOOD PRESSURE: 173 MMHG | HEART RATE: 56 BPM | TEMPERATURE: 97.34 F | DIASTOLIC BLOOD PRESSURE: 83 MMHG

## 2017-09-03 VITALS — HEART RATE: 60 BPM | SYSTOLIC BLOOD PRESSURE: 156 MMHG | DIASTOLIC BLOOD PRESSURE: 84 MMHG

## 2017-09-03 VITALS
OXYGEN SATURATION: 91 % | DIASTOLIC BLOOD PRESSURE: 79 MMHG | SYSTOLIC BLOOD PRESSURE: 148 MMHG | TEMPERATURE: 98.06 F | HEART RATE: 65 BPM

## 2017-09-03 VITALS — OXYGEN SATURATION: 91 % | HEART RATE: 71 BPM

## 2017-09-03 VITALS
HEART RATE: 71 BPM | OXYGEN SATURATION: 93 % | SYSTOLIC BLOOD PRESSURE: 161 MMHG | DIASTOLIC BLOOD PRESSURE: 79 MMHG | TEMPERATURE: 98.06 F

## 2017-09-03 VITALS — OXYGEN SATURATION: 92 %

## 2017-09-03 VITALS — HEART RATE: 50 BPM | OXYGEN SATURATION: 94 %

## 2017-09-03 VITALS — OXYGEN SATURATION: 94 % | HEART RATE: 58 BPM

## 2017-09-03 VITALS — SYSTOLIC BLOOD PRESSURE: 178 MMHG | DIASTOLIC BLOOD PRESSURE: 81 MMHG | HEART RATE: 78 BPM

## 2017-09-03 VITALS — OXYGEN SATURATION: 94 % | HEART RATE: 62 BPM

## 2017-09-03 LAB
ANION GAP SERPL CALC-SCNC: 5 MMOL/L (ref 3–11)
BUN SERPL-MCNC: 52 MG/DL (ref 7–18)
BUN/CREAT SERPL: 30.6 (ref 10–20)
CALCIUM SERPL-MCNC: 8.5 MG/DL (ref 8.5–10.1)
CHLORIDE SERPL-SCNC: 91 MMOL/L (ref 98–107)
CO2 SERPL-SCNC: 39 MMOL/L (ref 21–32)
CREAT CL PREDICTED SERPL C-G-VRATE: 32.4 ML/MIN
CREAT SERPL-MCNC: 1.7 MG/DL (ref 0.6–1.2)
GLUCOSE SERPL-MCNC: 279 MG/DL (ref 70–99)
MAGNESIUM SERPL-MCNC: 2.7 MG/DL (ref 1.8–2.4)
PHOSPHATE SERPL-MCNC: 5.5 MG/DL (ref 2.5–4.9)
POTASSIUM SERPL-SCNC: 4.5 MMOL/L (ref 3.5–5.1)
SODIUM SERPL-SCNC: 135 MMOL/L (ref 136–145)

## 2017-09-03 RX ADMIN — INSULIN ASPART SCH UNITS: 100 INJECTION, SOLUTION INTRAVENOUS; SUBCUTANEOUS at 18:03

## 2017-09-03 RX ADMIN — NYSTATIN SCH ML: 100000 SUSPENSION ORAL at 13:01

## 2017-09-03 RX ADMIN — IPRATROPIUM BROMIDE AND ALBUTEROL SULFATE SCH ML: .5; 3 SOLUTION RESPIRATORY (INHALATION) at 14:31

## 2017-09-03 RX ADMIN — NYSTATIN SCH ML: 100000 SUSPENSION ORAL at 08:17

## 2017-09-03 RX ADMIN — PANTOPRAZOLE SCH MG: 40 TABLET, DELAYED RELEASE ORAL at 08:16

## 2017-09-03 RX ADMIN — BUDESONIDE AND FORMOTEROL FUMARATE DIHYDRATE SCH PUFFS: 160; 4.5 AEROSOL RESPIRATORY (INHALATION) at 08:16

## 2017-09-03 RX ADMIN — INSULIN ASPART SCH UNITS: 100 INJECTION, SOLUTION INTRAVENOUS; SUBCUTANEOUS at 20:30

## 2017-09-03 RX ADMIN — ROPINIROLE HYDROCHLORIDE SCH MG: 1 TABLET, FILM COATED ORAL at 20:35

## 2017-09-03 RX ADMIN — AZITHROMYCIN SCH MG: 250 TABLET, FILM COATED ORAL at 08:18

## 2017-09-03 RX ADMIN — Medication SCH MCG: at 08:18

## 2017-09-03 RX ADMIN — HEPARIN SODIUM SCH UNIT: 10000 INJECTION, SOLUTION INTRAVENOUS; SUBCUTANEOUS at 20:29

## 2017-09-03 RX ADMIN — INSULIN ASPART SCH UNITS: 100 INJECTION, SOLUTION INTRAVENOUS; SUBCUTANEOUS at 13:07

## 2017-09-03 RX ADMIN — INSULIN ASPART SCH UNITS: 100 INJECTION, SOLUTION INTRAVENOUS; SUBCUTANEOUS at 08:32

## 2017-09-03 RX ADMIN — HEPARIN SODIUM SCH UNIT: 10000 INJECTION, SOLUTION INTRAVENOUS; SUBCUTANEOUS at 14:54

## 2017-09-03 RX ADMIN — METOPROLOL SUCCINATE SCH MG: 50 TABLET, EXTENDED RELEASE ORAL at 20:36

## 2017-09-03 RX ADMIN — HEPARIN SODIUM SCH UNIT: 10000 INJECTION, SOLUTION INTRAVENOUS; SUBCUTANEOUS at 06:12

## 2017-09-03 RX ADMIN — INSULIN GLARGINE SCH UNITS: 100 INJECTION, SOLUTION SUBCUTANEOUS at 08:32

## 2017-09-03 RX ADMIN — MIRABEGRON SCH MG: 25 TABLET, FILM COATED, EXTENDED RELEASE ORAL at 08:18

## 2017-09-03 RX ADMIN — FUROSEMIDE SCH MG: 20 TABLET ORAL at 08:16

## 2017-09-03 RX ADMIN — NYSTATIN SCH ML: 100000 SUSPENSION ORAL at 20:37

## 2017-09-03 RX ADMIN — PRAVASTATIN SODIUM SCH MG: 10 TABLET ORAL at 20:36

## 2017-09-03 RX ADMIN — IPRATROPIUM BROMIDE AND ALBUTEROL SULFATE SCH ML: .5; 3 SOLUTION RESPIRATORY (INHALATION) at 19:21

## 2017-09-03 RX ADMIN — FLUOXETINE SCH MG: 20 CAPSULE ORAL at 08:15

## 2017-09-03 RX ADMIN — IPRATROPIUM BROMIDE AND ALBUTEROL SULFATE SCH ML: .5; 3 SOLUTION RESPIRATORY (INHALATION) at 11:10

## 2017-09-03 RX ADMIN — ROPINIROLE HYDROCHLORIDE SCH MG: 1 TABLET, FILM COATED ORAL at 14:54

## 2017-09-03 RX ADMIN — BUDESONIDE AND FORMOTEROL FUMARATE DIHYDRATE SCH PUFFS: 160; 4.5 AEROSOL RESPIRATORY (INHALATION) at 20:41

## 2017-09-03 RX ADMIN — IPRATROPIUM BROMIDE AND ALBUTEROL SULFATE SCH ML: .5; 3 SOLUTION RESPIRATORY (INHALATION) at 07:07

## 2017-09-03 RX ADMIN — FERROUS SULFATE TAB EC 325 MG (65 MG FE EQUIVALENT) SCH MG: 325 (65 FE) TABLET DELAYED RESPONSE at 08:16

## 2017-09-03 RX ADMIN — NYSTATIN SCH ML: 100000 SUSPENSION ORAL at 17:58

## 2017-09-03 RX ADMIN — SPIRONOLACTONE SCH MG: 25 TABLET, FILM COATED ORAL at 08:20

## 2017-09-03 RX ADMIN — ROPINIROLE HYDROCHLORIDE SCH MG: 1 TABLET, FILM COATED ORAL at 08:15

## 2017-09-03 NOTE — PULMONOLOGY PROGRESS NOTE
Pulmonary Progress Note


Date of Service


Sep 3, 2017.





Attending








Subjective


Patient seen and examined.


States that she is feeling much better today.


She is still having dyspnea on exertion, but is no longer coughing and now with 

less lower extremity swelling.





Objective





General: Patient is awake, alert, cooperative, and in no acute distress. Obese.


Head: Normocephalic, Atraumatic.


ENT: PERRLA, No discharge, EOMI, Sclera normal


Neck: Normal ROM. Trachea midline. No stridor


Respiratory: Decreased breath sounds bilaterlly,  No respiratory distress. No 

accessory muscle use.


Cardiovascular: Regular rate and rhythm.No murmur appreciate. 


Abdomen: Normal bowel sounds hear throughout. No guarding. Abdomen is soft and 

nontender


Back: Normal inspection. 


Extremities:3+ pitting edema, cyanosis. Normal ROM


Neuro: Alert, Oriented x 3. CN II-XII grossly intact. Sensation and motor 

function grossly intact.


Psych: Mood and affect are normal. 


Pulses: Dorsal pedal





Assessment & Plan


Acute on chronic hypoxic, hypercapnic respiratory failure


COPD on long-term O2


Diastolic dysfunction with elevated RVSP


Pulmonary HTN


Deconditioning


Uncontrolled DM2 


Acute on chronic renal failure





Ms. Jacobson appears to be almost back at her baseline. She is no respiratory 

distress. She continues to have dyspnea on exertion which is improving slowly.





Patient continues on 3 L via nasal cannula. Recommend increasing patient's O2 

supplementation to 5L with ambulation. 


Will require 2 step prior to discharge to check O2 requirements with ambulation.


Continue to monitor O2 and keep SaO2 >92%.


Continue Azithromycin for antiinflammatory properties


Continue Symbicort and Duoneb- likely will add Spiriva to her regimen upon 

discharge


Continue aggressive diuresis- continue to maintain negative fluid balance as 

tolerated


She increased creatinine 1.7 for last three days. Currently on Lasix and 

Spironolactone. Consider increasing lasix 40 mg daily


Continue PT/OT- patient likely has large component of deconditioning. Likely 

will benefit from outpatient PT/OT post-D/C as well. May also consider 

pulmonary rehab. 


Continue with steroid taper.





She should follow up with in Pulmonary in 1-2 weeks post hospital discharge. 

Will sign off case today.





Data


Medications:





Current Inpatient Medications








 Medications


  (Trade)  Dose


 Ordered  Sig/Roya


 Route  Start Time


 Stop Time Status Last Admin


Dose Admin


 


 Heparin Sodium


  (Porcine)


  (Heparin Sq 5000


 Unit/0.5ml)  5,000 unit  Q8


 SQ  8/30/17 22:00


 9/29/17 21:59  9/3/17 06:12


5,000 UNIT


 


 Acetaminophen


  (Tylenol Tab)  650 mg  Q4H  PRN


 PO  8/30/17 14:00


 9/29/17 13:59   


 


 


 Al Hydrox/Mg


 Hydrox/Simethicone


  (Maalox Max Susp)  15 ml  Q4H  PRN


 PO  8/30/17 14:00


 9/29/17 13:59   


 


 


 Magnesium


 Hydroxide


  (Milk Of


 Magnesia Susp)  30 ml  Q12H  PRN


 PO  8/30/17 14:00


 9/29/17 13:59   


 


 


 Ondansetron HCl


  (Zofran Inj)  4 mg  Q6H  PRN


 IV  8/30/17 14:00


 9/29/17 13:59   


 


 


 Polyethylene


  (Miralax Powder


 Packet)  17 gm  DAILY  PRN


 PO  8/30/17 14:00


 9/29/17 13:59   


 


 


 Albuterol/


 Ipratropium


  (Duoneb)  3 ml  QIDR


 INH  8/30/17 16:00


 9/29/17 15:59  9/3/17 07:07


3 ML


 


 Budesonide/


 Formoterol


 Fumarate


  (Symbicort 160/


 4.5 Inh)  2 puffs  BID


 INH  8/30/17 21:00


 9/29/17 20:59  9/3/17 08:16


2 PUFFS


 


 Cyanocobalamin


  (Vitamin B-12


 Tab)  1,000 mcg  DAILY


 PO  8/31/17 09:00


 9/30/17 08:59  9/3/17 08:18


1,000 MCG


 


 Metoprolol


 Succinate


  (Toprol Xl Tab)  100 mg  QPM


 PO  8/30/17 21:00


 9/29/17 20:59  9/2/17 21:50


100 MG


 


 Mirabegron


  (Myrbetriq Er)  25 mg  DAILY


 PO  8/31/17 09:00


 9/30/17 08:59  9/3/17 08:18


25 MG


 


 Pravastatin Sodium


  (Pravachol Tab)  10 mg  QPM


 PO  8/30/17 21:00


 9/29/17 20:59  9/2/17 21:51


10 MG


 


 Ropinirole HCl


  (Requip Tab)  2 mg  TID


 PO  8/30/17 21:00


 9/29/17 20:59  9/3/17 08:15


2 MG


 


 Tramadol HCl


  (Ultram Tab)  50 mg  Q6  PRN


 PO  8/30/17 14:00


 9/29/17 13:59   


 


 


 Ferrous Sulfate


  (Feosol Tab)  325 mg  DAILY


 PO  8/31/17 09:00


 9/30/17 08:59  9/3/17 08:16


325 MG


 


 Nystatin


  (Mycostatin Susp)  5 ml  QID


 PO  8/30/17 17:00


 9/9/17 16:59  9/3/17 08:17


5 ML


 


 Insulin Aspart


  (novoLOG ASPART)  **SLIDING


 SCALE**


 **If C...  ACHS


 SC  8/30/17 16:00


 9/29/17 15:59  9/3/17 08:32


12 UNITS


 


 Glucose


  (Glucose 40% Gel)  15-30


 GRAMS 15


 GRAMS...  UD  PRN


 PO  8/30/17 14:30


 9/29/17 14:29   


 


 


 Glucose


  (Glucose Chew


 Tab)  4-8


 Tablets 4


 Tabl...  UD  PRN


 PO  8/30/17 14:30


 9/29/17 14:29   


 


 


 Dextrose


  (Dextrose 50%


 50ML Syringe)  25-50ML OF


 50% DW IV


 FOR...  UD  PRN


 IV  8/30/17 14:30


 9/29/17 14:29   


 


 


 Glucagon


  (Glucagon Inj)  1 mg  UD  PRN


 SQ  8/30/17 14:30


 9/29/17 14:29   


 


 


 Albuterol/


 Ipratropium


  (Duoneb)  3 ml  Q2H  PRN


 INH  8/30/17 17:15


 9/29/17 17:14   


 


 


 Furosemide


  (Lasix Tab)  20 mg  DAILY


 PO  8/31/17 09:00


 9/30/17 08:59  9/3/17 08:16


20 MG


 


 Spironolactone


  (Aldactone Tab)  12.5 mg  QAM


 PO  8/31/17 09:00


 9/30/17 08:59  9/3/17 08:20


12.5 MG


 


 Insulin Glargine


  (Lantus Solostar


 Pen)  16 units  DAILY


 SC  8/31/17 09:00


 9/30/17 08:59  9/3/17 08:32


16 UNITS


 


 Pantoprazole


 Sodium


  (Protonix Tab)  40 mg  QAM


 PO  9/1/17 09:00


 10/1/17 08:59  9/3/17 08:16


40 MG


 


 Azithromycin


  (Zithromax Tab)  250 mg  QAM


 PO  9/3/17 08:00


 9/10/17 08:59  9/3/17 08:18


250 MG


 


 Prednisone


  (PredniSONE TAB)  40 mg  BID


 PO  9/2/17 20:00


 10/2/17 20:59  9/3/17 08:14


40 MG


 


 Lisinopril


  (Zestril Tab)  10 mg  DAILY


 PO  9/3/17 08:00


 9/30/17 08:59  9/3/17 08:17


10 MG


 


 Fluoxetine HCl


  (Prozac Cap)  40 mg  QAM


 PO  9/3/17 08:00


 9/30/17 08:59  9/3/17 08:15


40 MG








Vital Signs:











  Date Time  Temp Pulse Resp B/P (MAP) Pulse Ox O2 Delivery O2 Flow Rate FiO2


 


9/3/17 07:31 36.3 56 20 173/83 (113) 94 Nasal Cannula 3.0 


 


9/3/17 07:09  50 18  94 Nasal Cannula 3.0 


 


9/3/17 00:00     93 Nasal Cannula 3.0 


 


9/3/17 00:00 36.7 71 22 161/79 (106) 94 Nasal Cannula 3.0 


 


9/2/17 20:00     93 Nasal Cannula 3.0 


 


9/2/17 19:01  70 18  93 Nasal Cannula 3.0 


 


9/2/17 15:20     93 Nasal Cannula 3.0 


 


9/2/17 15:20 36.7 69 18 163/83 (109) 91 Nasal Cannula 3.0 


 


9/2/17 14:53 36.7 78 16  93  3.0 


 


9/2/17 14:31  78 16  93 Nasal Cannula 3.0 


 


9/2/17 12:33 36.7 68 22 178/97 (124) 96 Nasal Cannula 4.0 


 


9/2/17 12:00     93 Nasal Cannula 2.0 


 


9/2/17 11:18  61 16  95 Nasal Cannula 3.0 








Laboratory Results:





Last 24 Hours








Test


  9/2/17


11:27 9/2/17


16:11 9/2/17


19:58 9/3/17


05:26


 


Bedside Glucose 347 mg/dl  252 mg/dl  280 mg/dl  


 


Sodium Level    135 mmol/L 


 


Potassium Level    4.5 mmol/L 


 


Chloride Level    91 mmol/L 


 


Carbon Dioxide Level    39 mmol/L 


 


Anion Gap    5.0 mmol/L 


 


Blood Urea Nitrogen    52 mg/dl 


 


Creatinine    1.70 mg/dl 


 


Est Creatinine Clear Calc


Drug Dose 


  


  


  32.4 ml/min 


 


 


Estimated GFR (


American) 


  


  


  33.8 


 


 


Estimated GFR (Non-


American 


  


  


  29.2 


 


 


BUN/Creatinine Ratio    30.6 


 


Random Glucose    279 mg/dl 


 


Calcium Level    8.5 mg/dl 


 


Phosphorus Level    5.5 mg/dl 


 


Magnesium Level    2.7 mg/dl 


 


Test


  9/3/17


08:09 


  


  


 


 


Bedside Glucose 293 mg/dl

## 2017-09-03 NOTE — PROGRESS NOTE
Subjective


Date of Service:


Sep 3, 2017.


Subjective


Pt evaluation today including:  conversation w/ patient, physical exam, chart 

review, lab review, review of studies, conversation w/ consultant, review of 

inpatient medication list


Patient continue to have fatigue today. She feels that her breathing has 

improved. She is hoping to have home physical therapy upon discharge. She 

complains of no pain. Bowels moving well. 





Labs today:


Na+ 135, K+ 91, Bicarb 39


Creatinine 1.7- stable


Phosphorus 5.5, Magnesium 2.7


WBC 5.17


Hgb/Hct


9.9/31.6





No new imaging. Discussed patient with Dr. Short and Dr. Ceron.


Blood cultures: NGTD





Problem List


Medical Problems:


(1) Cellulitis


Status: Acute  





(2) COPD exacerbation


Status: Acute  





(3) COPD exacerbation


Status: Acute  





(4) COPD exacerbation


Status: Acute  





(5) Hypoxia


Status: Acute  





(6) SOB (shortness of breath)


Status: Acute  











Review of Systems


All Other Systems:  Reviewed and Negative





Medications





Current Inpatient Medications








 Medications


  (Trade)  Dose


 Ordered  Sig/Roya


 Route  Start Time


 Stop Time Status Last Admin


Dose Admin


 


 Heparin Sodium


  (Porcine)


  (Heparin Sq 5000


 Unit/0.5ml)  5,000 unit  Q8


 SQ  8/30/17 22:00


 9/29/17 21:59  9/3/17 06:12


5,000 UNIT


 


 Acetaminophen


  (Tylenol Tab)  650 mg  Q4H  PRN


 PO  8/30/17 14:00


 9/29/17 13:59   


 


 


 Al Hydrox/Mg


 Hydrox/Simethicone


  (Maalox Max Susp)  15 ml  Q4H  PRN


 PO  8/30/17 14:00


 9/29/17 13:59   


 


 


 Magnesium


 Hydroxide


  (Milk Of


 Magnesia Susp)  30 ml  Q12H  PRN


 PO  8/30/17 14:00


 9/29/17 13:59   


 


 


 Ondansetron HCl


  (Zofran Inj)  4 mg  Q6H  PRN


 IV  8/30/17 14:00


 9/29/17 13:59   


 


 


 Polyethylene


  (Miralax Powder


 Packet)  17 gm  DAILY  PRN


 PO  8/30/17 14:00


 9/29/17 13:59   


 


 


 Albuterol/


 Ipratropium


  (Duoneb)  3 ml  QIDR


 INH  8/30/17 16:00


 9/29/17 15:59  9/3/17 11:10


3 ML


 


 Budesonide/


 Formoterol


 Fumarate


  (Symbicort 160/


 4.5 Inh)  2 puffs  BID


 INH  8/30/17 21:00


 9/29/17 20:59  9/3/17 08:16


2 PUFFS


 


 Cyanocobalamin


  (Vitamin B-12


 Tab)  1,000 mcg  DAILY


 PO  8/31/17 09:00


 9/30/17 08:59  9/3/17 08:18


1,000 MCG


 


 Metoprolol


 Succinate


  (Toprol Xl Tab)  100 mg  QPM


 PO  8/30/17 21:00


 9/29/17 20:59  9/2/17 21:50


100 MG


 


 Mirabegron


  (Myrbetriq Er)  25 mg  DAILY


 PO  8/31/17 09:00


 9/30/17 08:59  9/3/17 08:18


25 MG


 


 Pravastatin Sodium


  (Pravachol Tab)  10 mg  QPM


 PO  8/30/17 21:00


 9/29/17 20:59  9/2/17 21:51


10 MG


 


 Ropinirole HCl


  (Requip Tab)  2 mg  TID


 PO  8/30/17 21:00


 9/29/17 20:59  9/3/17 08:15


2 MG


 


 Tramadol HCl


  (Ultram Tab)  50 mg  Q6  PRN


 PO  8/30/17 14:00


 9/29/17 13:59   


 


 


 Ferrous Sulfate


  (Feosol Tab)  325 mg  DAILY


 PO  8/31/17 09:00


 9/30/17 08:59  9/3/17 08:16


325 MG


 


 Nystatin


  (Mycostatin Susp)  5 ml  QID


 PO  8/30/17 17:00


 9/9/17 16:59  9/3/17 08:17


5 ML


 


 Insulin Aspart


  (novoLOG ASPART)  **SLIDING


 SCALE**


 **If C...  ACHS


 SC  8/30/17 16:00


 9/29/17 15:59  9/3/17 08:32


12 UNITS


 


 Glucose


  (Glucose 40% Gel)  15-30


 GRAMS 15


 GRAMS...  UD  PRN


 PO  8/30/17 14:30


 9/29/17 14:29   


 


 


 Glucose


  (Glucose Chew


 Tab)  4-8


 Tablets 4


 Tabl...  UD  PRN


 PO  8/30/17 14:30


 9/29/17 14:29   


 


 


 Dextrose


  (Dextrose 50%


 50ML Syringe)  25-50ML OF


 50% DW IV


 FOR...  UD  PRN


 IV  8/30/17 14:30


 9/29/17 14:29   


 


 


 Glucagon


  (Glucagon Inj)  1 mg  UD  PRN


 SQ  8/30/17 14:30


 9/29/17 14:29   


 


 


 Albuterol/


 Ipratropium


  (Duoneb)  3 ml  Q2H  PRN


 INH  8/30/17 17:15


 9/29/17 17:14   


 


 


 Furosemide


  (Lasix Tab)  20 mg  DAILY


 PO  8/31/17 09:00


 9/30/17 08:59  9/3/17 08:16


20 MG


 


 Spironolactone


  (Aldactone Tab)  12.5 mg  QAM


 PO  8/31/17 09:00


 9/30/17 08:59  9/3/17 08:20


12.5 MG


 


 Insulin Glargine


  (Lantus Solostar


 Pen)  16 units  DAILY


 SC  8/31/17 09:00


 9/30/17 08:59  9/3/17 08:32


16 UNITS


 


 Pantoprazole


 Sodium


  (Protonix Tab)  40 mg  QAM


 PO  9/1/17 09:00


 10/1/17 08:59  9/3/17 08:16


40 MG


 


 Azithromycin


  (Zithromax Tab)  250 mg  QAM


 PO  9/3/17 08:00


 9/10/17 08:59  9/3/17 08:18


250 MG


 


 Lisinopril


  (Zestril Tab)  10 mg  DAILY


 PO  9/3/17 08:00


 9/30/17 08:59  9/3/17 08:17


10 MG


 


 Fluoxetine HCl


  (Prozac Cap)  40 mg  QAM


 PO  9/3/17 08:00


 9/30/17 08:59  9/3/17 08:15


40 MG


 


 Prednisone


  (PredniSONE TAB)  40 mg  DAILY


 PO  9/4/17 08:00


 10/2/17 20:59 UNV  


 











Objective


Vital Signs











  Date Time  Temp Pulse Resp B/P (MAP) Pulse Ox O2 Delivery O2 Flow Rate FiO2


 


9/3/17 11:25  58 18  94 Nasal Cannula 3.0 


 


9/3/17 07:31 36.3 56 20 173/83 (113) 94 Nasal Cannula 3.0 


 


9/3/17 07:09  50 18  94 Nasal Cannula 3.0 


 


9/3/17 00:00     93 Nasal Cannula 3.0 


 


9/3/17 00:00 36.7 71 22 161/79 (106) 94 Nasal Cannula 3.0 


 


9/2/17 20:00     93 Nasal Cannula 3.0 


 


9/2/17 19:01  70 18  93 Nasal Cannula 3.0 


 


9/2/17 15:20     93 Nasal Cannula 3.0 


 


9/2/17 15:20 36.7 69 18 163/83 (109) 91 Nasal Cannula 3.0 


 


9/2/17 14:53 36.7 78 16  93  3.0 


 


9/2/17 14:31  78 16  93 Nasal Cannula 3.0 











Physical Exam


General Appearance:  no apparent distress, + obese


Eyes:  normal inspection, sclerae normal


ENT:  hearing grossly normal


Neck:  supple, trachea midline


Respiratory/Chest:  chest non-tender, lungs clear, normal breath sounds, no 

respiratory distress, no accessory muscle use


Cardiovascular:  regular rate, rhythm


Abdomen:  normal bowel sounds, non tender, soft


Extremities:  normal range of motion, + pertinent finding (trace to 1+ pitting 

edema b/l lower extremities)


Neurologic/Psychiatric:  alert, normal mood/affect


Skin:  normal color, warm/dry, no rash


Comments:


VS 9/3


T 36.3 C


HR 56


RR 20 


/83


SaO2 94 % on 3 L via nasal cannula





Laboratory Results





Last 24 Hours








Test


  9/2/17


16:11 9/2/17


19:58 9/3/17


05:26 9/3/17


08:09


 


Bedside Glucose 252 mg/dl  280 mg/dl   293 mg/dl 


 


Sodium Level   135 mmol/L  


 


Potassium Level   4.5 mmol/L  


 


Chloride Level   91 mmol/L  


 


Carbon Dioxide Level   39 mmol/L  


 


Anion Gap   5.0 mmol/L  


 


Blood Urea Nitrogen   52 mg/dl  


 


Creatinine   1.70 mg/dl  


 


Est Creatinine Clear Calc


Drug Dose 


  


  32.4 ml/min 


  


 


 


Estimated GFR (


American) 


  


  33.8 


  


 


 


Estimated GFR (Non-


American 


  


  29.2 


  


 


 


BUN/Creatinine Ratio   30.6  


 


Random Glucose   279 mg/dl  


 


Calcium Level   8.5 mg/dl  


 


Phosphorus Level   5.5 mg/dl  


 


Magnesium Level   2.7 mg/dl  


 


Test


  9/3/17


11:21 


  


  


 


 


Bedside Glucose 315 mg/dl    











Assessment and Plan


Acute on chronic respiratory failure with hypoxia/hypercapnea


Long term dependence on O2 at home


COPD with exacerbation


Acute on chronic diastolic CHF





Patient is improving overall, but continues to have fatigue and elevated 

creatinine. 


Patient initially on Solu-Medrol 60 mg Q. 8 hours, but then transitioned to PO 

prednisone 40 mg BID. Discussed with Dr. Short- will decrease to 40 mg ONCE 

daily and then taper as outpatient if tolerating


Continue Duoneb Q 4 hrs while awake and Q 2 hr PRN


Continues on 3 L via nasal cannula- similar to typical at home consumption. May 

need increased flow rate with ambulation


Continues Lasix 40 mg daily and Aldactone 25 mg daily for increased fluid 

retention- Creatinine stable so far


Continue lisinopril and metoprolol at current doses. May need adjustment if BP 

continues to be elevated.





Diabetes- Continue sliding scale insulin while in house. Transition to typical 

regimen upon D/C





Hyperlipidemia- continue pravastatin





Deconditioning- PT/OT feels that patient's needs may be adequately met with 

home health services. Will place order for Home Health upon discharge





Obesity





GI/DVT prophylaxis- in place


Continued [facility] stay due to:  multiple IV medications needed


Discharge planning:  home

## 2017-09-04 VITALS
DIASTOLIC BLOOD PRESSURE: 87 MMHG | SYSTOLIC BLOOD PRESSURE: 155 MMHG | OXYGEN SATURATION: 96 % | HEART RATE: 64 BPM | TEMPERATURE: 97.34 F

## 2017-09-04 VITALS
DIASTOLIC BLOOD PRESSURE: 84 MMHG | TEMPERATURE: 98.06 F | HEART RATE: 65 BPM | SYSTOLIC BLOOD PRESSURE: 161 MMHG | OXYGEN SATURATION: 93 %

## 2017-09-04 VITALS — HEART RATE: 63 BPM | OXYGEN SATURATION: 93 %

## 2017-09-04 VITALS
HEART RATE: 68 BPM | OXYGEN SATURATION: 91 % | SYSTOLIC BLOOD PRESSURE: 152 MMHG | DIASTOLIC BLOOD PRESSURE: 79 MMHG | TEMPERATURE: 98.06 F

## 2017-09-04 VITALS — OXYGEN SATURATION: 89 % | HEART RATE: 68 BPM

## 2017-09-04 VITALS — OXYGEN SATURATION: 91 %

## 2017-09-04 RX ADMIN — FUROSEMIDE SCH MG: 20 TABLET ORAL at 08:41

## 2017-09-04 RX ADMIN — MIRABEGRON SCH MG: 25 TABLET, FILM COATED, EXTENDED RELEASE ORAL at 08:42

## 2017-09-04 RX ADMIN — NYSTATIN SCH ML: 100000 SUSPENSION ORAL at 08:40

## 2017-09-04 RX ADMIN — INSULIN ASPART SCH UNITS: 100 INJECTION, SOLUTION INTRAVENOUS; SUBCUTANEOUS at 08:48

## 2017-09-04 RX ADMIN — HEPARIN SODIUM SCH UNIT: 10000 INJECTION, SOLUTION INTRAVENOUS; SUBCUTANEOUS at 06:09

## 2017-09-04 RX ADMIN — IPRATROPIUM BROMIDE AND ALBUTEROL SULFATE SCH ML: .5; 3 SOLUTION RESPIRATORY (INHALATION) at 11:08

## 2017-09-04 RX ADMIN — ROPINIROLE HYDROCHLORIDE SCH MG: 1 TABLET, FILM COATED ORAL at 08:38

## 2017-09-04 RX ADMIN — FLUOXETINE SCH MG: 20 CAPSULE ORAL at 08:41

## 2017-09-04 RX ADMIN — IPRATROPIUM BROMIDE AND ALBUTEROL SULFATE SCH ML: .5; 3 SOLUTION RESPIRATORY (INHALATION) at 15:26

## 2017-09-04 RX ADMIN — BUDESONIDE AND FORMOTEROL FUMARATE DIHYDRATE SCH PUFFS: 160; 4.5 AEROSOL RESPIRATORY (INHALATION) at 08:44

## 2017-09-04 RX ADMIN — Medication SCH MCG: at 08:38

## 2017-09-04 RX ADMIN — PANTOPRAZOLE SCH MG: 40 TABLET, DELAYED RELEASE ORAL at 08:38

## 2017-09-04 RX ADMIN — SPIRONOLACTONE SCH MG: 25 TABLET, FILM COATED ORAL at 08:37

## 2017-09-04 RX ADMIN — INSULIN ASPART SCH UNITS: 100 INJECTION, SOLUTION INTRAVENOUS; SUBCUTANEOUS at 13:27

## 2017-09-04 RX ADMIN — HEPARIN SODIUM SCH UNIT: 10000 INJECTION, SOLUTION INTRAVENOUS; SUBCUTANEOUS at 13:27

## 2017-09-04 RX ADMIN — ROPINIROLE HYDROCHLORIDE SCH MG: 1 TABLET, FILM COATED ORAL at 13:27

## 2017-09-04 RX ADMIN — IPRATROPIUM BROMIDE AND ALBUTEROL SULFATE SCH ML: .5; 3 SOLUTION RESPIRATORY (INHALATION) at 07:27

## 2017-09-04 RX ADMIN — AZITHROMYCIN SCH MG: 250 TABLET, FILM COATED ORAL at 08:40

## 2017-09-04 RX ADMIN — NYSTATIN SCH ML: 100000 SUSPENSION ORAL at 12:15

## 2017-09-04 RX ADMIN — FERROUS SULFATE TAB EC 325 MG (65 MG FE EQUIVALENT) SCH MG: 325 (65 FE) TABLET DELAYED RESPONSE at 08:38

## 2017-09-04 NOTE — DISCHARGE INSTRUCTIONS
Discharge Instructions


Date of Service


Sep 4, 2017.





Admission


Reason for Admission:  Chf Exacerbation; Copd Exacerbation





Discharge


Discharge Diagnosis / Problem:  CHF exacerbation, COPD exacerbation





Discharge Goals


Goal(s):  Decrease discomfort, Improve function, Increase independence, Improve 

disease control, Learn about illness, Diagnostic testing, Therapeutic 

intervention





Activity Recommendations


Activity Limitations:  resume your previous activity


Exercise/Sports Limitations:  as tolerated





.





Instructions / Follow-Up


Instructions / Follow-Up


Patient to be discharged home with home health


Please continue to take azithromycin 250 mg tablet once a day for 6 more days


Please take prednisone taper as directed, 5mg tablet, 6 pills daily for 3 days, 

then 4 pills daily for 3 days, then 2 pills daily for 3 days then stop


Notice increase in lasix to 40 mg daily and lisinopril to 12.5 mg daily


If worsening shortness of breath, chest pain, fevers, please report to ER





Current Hospital Diet


Patient's current hospital diet: AHA Diet (Heart Healthy), Diabetes Type 2 Diet





Discharge Diet


Recommended Diet:  AHA Diet (Heart Healthy), Diabetes Type 2 Diet





Pending Studies


Studies pending at discharge:  no





Medical Emergencies








.


Who to Call and When:





Medical Emergencies:  If at any time you feel your situation is an emergency, 

please call 911 immediately.





.





Non-Emergent Contact


Non-Emergency issues call your:  Primary Care Provider


Call Non-Emergent contact if:  you have a fever, your pain is worsening





.


.








"Provider Documentation" section prepared by Crow Zambrano.








.





VTE Core Measure


Inpt VTE Proph given/why not?:  Unfractionated heparin SQ, T.E.D. Stockings, SCD

's

## 2017-09-04 NOTE — DISCHARGE SUMMARY
Discharge Summary


Date of Service


Sep 4, 2017.





Discharge Summary


Admission Date:


Aug 30, 2017 at 14:13


Discharge Date:  Sep 4, 2017


Discharge Disposition:  Home with services


Principal Diagnosis:  COPD exacerbation, CHF exacerbation


Immunizations:  


   Have You Had Influenza Vaccine:  Yes


   Influenza Vaccine Date:  Oct 17, 2013


   History of Tetanus Vaccine?:  Yes


   Tetanus Immunization Date:  Oct 8, 2013


   History of Pneumococcal:  Yes


   Pneumococcal Date:  Nov 17, 2010


   History of Hepatitis B Vaccine:  No


Consultations:


Pulmonary





Discharge Exam


Review of Systems:  


   Constitutional:  No fever, No chills, No sweats, No weakness


   Respiratory:  No cough, No sputum, No wheezing, No shortness of breath, No 

dyspnea on exertion


   Cardiovascular:  No chest pain, No orthopnea, No PND, No edema


   Abdomen:  No pain, No nausea, No vomiting, No diarrhea


   Musculoskeletal:  No joint pain, No muscle pain, No swelling, No calf pain


   Genitourinary - Female:  No dysuria, No urinary frequency, No urinary urgency

, No urinary incontinence


   Neurologic:  No memory loss, No paralysis, No weakness, No numbness/tingling


   Psychiatric:  No depression symptoms, No anhedonism, No anxiety, No insomnia


   Endocrine:  No fatigue, No excessive thirst


   Integumentary:  No rash, No itch


Physical Exam:  


   General Appearance:  WD/WN, no apparent distress


   Eyes:  normal inspection, PERRL, EOMI, sclerae normal


   Neck:  supple, no adenopathy, thyroid normal, no JVD


   Respiratory/Chest:  chest non-tender, lungs clear, normal breath sounds, no 

respiratory distress


   Cardiovascular:  regular rate, rhythm, no edema, no gallop, no JVD


   Abdomen / GI:  normal bowel sounds, non tender, soft, no organomegaly


   Extremities:  normal inspection, no calf tenderness, normal capillary refill

, no pedal edema


   Neurologic/Psychiatric:  alert, normal mood/affect, normal reflexes, 

oriented x 3


   Skin:  normal color, warm/dry, no rash


   Lymphatic:  no adenopathy





Hospital Course


Acute on chronic respiratory failure with hypoxia/hypercapnea


Long term dependence on O2 at home


COPD with exacerbation


Acute on chronic diastolic CHF





Patient is improving overall


Patient initially on Solu-Medrol 60 mg Q. 8 hours, but then transitioned to PO 

prednisone taper on discharge:


Prednisone 30 mg x 3 days, 20 mg x 3 days then 10 mg x 3 days then stop


Continue Duoneb Q 4 hrs while awake and Q 2 hr PRN


Continues on 3 L via nasal cannula- similar to typical at home consumption. May 

need increased flow rate with ambulation


Increased Lasix to 40 mg daily and Aldactone 25 mg daily for increased fluid 

retention- Creatinine stable so far


Elev BP, Increase lisinopril 10 12.g mg PO daily and cont metoprolol at current 

doses. May need adjustment if BP continues to be elevated.


Finally 7 day course of azithromycin on DC to cover atypicals with COPD exab





Diabetes- Continue sliding scale insulin while in house. Transition to typical 

regimen upon D/C





Hyperlipidemia- continue pravastatin





Deconditioning- PT/OT feels that patient's needs may be adequately met with 

home health services. Will place order for Home Health upon discharge





Obesity


Total Time Spent:  Greater than 30 minutes


This includes examination of the patient, discharge planning, medication 

reconciliation, and communication with other providers.





Discharge Instructions


Please refer to the electronic Patient Visit Report (Discharge Instructions) 

for additional information.





Additional Copies To


Marty Tineo D.O.

## 2017-11-13 ENCOUNTER — HOSPITAL ENCOUNTER (OUTPATIENT)
Dept: HOSPITAL 45 - C.LABCC | Age: 72
Discharge: NURSING, REHAB OR LONG-TERM CARE | End: 2017-11-13
Attending: INTERNAL MEDICINE
Payer: COMMERCIAL

## 2017-11-13 DIAGNOSIS — N18.9: Primary | ICD-10-CM

## 2017-11-13 LAB
ANION GAP SERPL CALC-SCNC: 12 MMOL/L (ref 3–11)
BUN SERPL-MCNC: 105 MG/DL (ref 7–18)
BUN/CREAT SERPL: 28.7 (ref 10–20)
CALCIUM SERPL-MCNC: 8.7 MG/DL (ref 8.5–10.1)
CHLORIDE SERPL-SCNC: 96 MMOL/L (ref 98–107)
CO2 SERPL-SCNC: 27 MMOL/L (ref 21–32)
CREAT SERPL-MCNC: 3.65 MG/DL (ref 0.6–1.2)
GLUCOSE SERPL-MCNC: 134 MG/DL (ref 70–99)
POTASSIUM SERPL-SCNC: 5.1 MMOL/L (ref 3.5–5.1)
SODIUM SERPL-SCNC: 135 MMOL/L (ref 136–145)

## 2017-11-14 ENCOUNTER — HOSPITAL ENCOUNTER (OUTPATIENT)
Dept: HOSPITAL 45 - C.LABCC | Age: 72
Discharge: NURSING, REHAB OR LONG-TERM CARE | End: 2017-11-14
Attending: INTERNAL MEDICINE
Payer: COMMERCIAL

## 2017-11-14 DIAGNOSIS — N18.9: Primary | ICD-10-CM

## 2017-11-14 LAB
ANION GAP SERPL CALC-SCNC: 10 MMOL/L (ref 3–11)
B-OH-BUTYR SERPL-SCNC: 0.98 MG/DL (ref 0.2–2.81)
BASOPHILS # BLD: 0 K/UL (ref 0–0.2)
BASOPHILS NFR BLD: 0 %
BUN SERPL-MCNC: 105 MG/DL (ref 7–18)
BUN/CREAT SERPL: 30.7 (ref 10–20)
CALCIUM SERPL-MCNC: 8.9 MG/DL (ref 8.5–10.1)
CHLORIDE SERPL-SCNC: 93 MMOL/L (ref 98–107)
CO2 SERPL-SCNC: 30 MMOL/L (ref 21–32)
COMPLETE: YES
CREAT SERPL-MCNC: 3.42 MG/DL (ref 0.6–1.2)
EOSINOPHIL NFR BLD AUTO: 179 K/UL (ref 130–400)
GLUCOSE SERPL-MCNC: 428 MG/DL (ref 70–99)
HCT VFR BLD CALC: 35.4 % (ref 37–47)
IG%: 0.4 %
IMM GRANULOCYTES NFR BLD AUTO: 3.2 %
LYMPHOCYTES # BLD: 0.33 K/UL (ref 1.2–3.4)
MCH RBC QN AUTO: 31.5 PG (ref 25–34)
MCHC RBC AUTO-ENTMCNC: 32.5 G/DL (ref 32–36)
MCV RBC AUTO: 97 FL (ref 80–100)
MONOCYTES NFR BLD: 2.8 %
NEUTROPHILS # BLD AUTO: 0 %
NEUTROPHILS NFR BLD AUTO: 93.6 %
PMV BLD AUTO: 10.9 FL (ref 7.4–10.4)
POTASSIUM SERPL-SCNC: 6 MMOL/L (ref 3.5–5.1)
RBC # BLD AUTO: 3.65 M/UL (ref 4.2–5.4)
SODIUM SERPL-SCNC: 133 MMOL/L (ref 136–145)
WBC # BLD AUTO: 10.41 K/UL (ref 4.8–10.8)

## 2017-11-15 ENCOUNTER — HOSPITAL ENCOUNTER (OUTPATIENT)
Dept: HOSPITAL 45 - C.LABCC | Age: 72
Discharge: NURSING, REHAB OR LONG-TERM CARE | End: 2017-11-15
Attending: INTERNAL MEDICINE
Payer: COMMERCIAL

## 2017-11-15 DIAGNOSIS — N18.9: Primary | ICD-10-CM

## 2017-11-15 LAB
ANION GAP SERPL CALC-SCNC: 11 MMOL/L (ref 3–11)
BASOPHILS # BLD: 0.01 K/UL (ref 0–0.2)
BASOPHILS NFR BLD: 0.1 %
BUN SERPL-MCNC: 108 MG/DL (ref 7–18)
BUN/CREAT SERPL: 30 (ref 10–20)
CALCIUM SERPL-MCNC: 9.5 MG/DL (ref 8.5–10.1)
CHLORIDE SERPL-SCNC: 93 MMOL/L (ref 98–107)
CO2 SERPL-SCNC: 29 MMOL/L (ref 21–32)
COMPLETE: YES
CREAT SERPL-MCNC: 3.6 MG/DL (ref 0.6–1.2)
EOSINOPHIL NFR BLD AUTO: 222 K/UL (ref 130–400)
GLUCOSE SERPL-MCNC: 181 MG/DL (ref 70–99)
HCT VFR BLD CALC: 40.5 % (ref 37–47)
IG%: 0.3 %
IMM GRANULOCYTES NFR BLD AUTO: 7.7 %
LYMPHOCYTES # BLD: 0.96 K/UL (ref 1.2–3.4)
MCH RBC QN AUTO: 31.7 PG (ref 25–34)
MCHC RBC AUTO-ENTMCNC: 32.6 G/DL (ref 32–36)
MCV RBC AUTO: 97.1 FL (ref 80–100)
MONOCYTES NFR BLD: 3.8 %
NEUTROPHILS # BLD AUTO: 0.2 %
NEUTROPHILS NFR BLD AUTO: 87.9 %
PMV BLD AUTO: 11.2 FL (ref 7.4–10.4)
POTASSIUM SERPL-SCNC: 5.7 MMOL/L (ref 3.5–5.1)
RBC # BLD AUTO: 4.17 M/UL (ref 4.2–5.4)
SODIUM SERPL-SCNC: 133 MMOL/L (ref 136–145)
WBC # BLD AUTO: 12.52 K/UL (ref 4.8–10.8)

## 2017-11-18 ENCOUNTER — HOSPITAL ENCOUNTER (INPATIENT)
Dept: HOSPITAL 45 - C.MSICU | Age: 72
LOS: 4 days | Discharge: NURSING, REHAB OR LONG-TERM CARE | DRG: 637 | End: 2017-11-22
Attending: FAMILY MEDICINE | Admitting: HOSPITALIST
Payer: COMMERCIAL

## 2017-11-18 VITALS — OXYGEN SATURATION: 97 % | HEART RATE: 55 BPM

## 2017-11-18 VITALS — OXYGEN SATURATION: 97 % | HEART RATE: 62 BPM | DIASTOLIC BLOOD PRESSURE: 63 MMHG | SYSTOLIC BLOOD PRESSURE: 110 MMHG

## 2017-11-18 VITALS — SYSTOLIC BLOOD PRESSURE: 91 MMHG | HEART RATE: 55 BPM | DIASTOLIC BLOOD PRESSURE: 40 MMHG | OXYGEN SATURATION: 96 %

## 2017-11-18 VITALS
DIASTOLIC BLOOD PRESSURE: 60 MMHG | SYSTOLIC BLOOD PRESSURE: 115 MMHG | TEMPERATURE: 97.34 F | HEART RATE: 61 BPM | OXYGEN SATURATION: 97 %

## 2017-11-18 VITALS
OXYGEN SATURATION: 99 % | SYSTOLIC BLOOD PRESSURE: 125 MMHG | TEMPERATURE: 97.7 F | HEART RATE: 69 BPM | DIASTOLIC BLOOD PRESSURE: 69 MMHG

## 2017-11-18 VITALS
OXYGEN SATURATION: 95 % | DIASTOLIC BLOOD PRESSURE: 39 MMHG | SYSTOLIC BLOOD PRESSURE: 67 MMHG | HEART RATE: 49 BPM | TEMPERATURE: 98.06 F

## 2017-11-18 VITALS — HEART RATE: 55 BPM | DIASTOLIC BLOOD PRESSURE: 43 MMHG | SYSTOLIC BLOOD PRESSURE: 102 MMHG | OXYGEN SATURATION: 97 %

## 2017-11-18 VITALS — HEART RATE: 57 BPM | OXYGEN SATURATION: 95 %

## 2017-11-18 VITALS
HEART RATE: 62 BPM | OXYGEN SATURATION: 97 % | DIASTOLIC BLOOD PRESSURE: 66 MMHG | SYSTOLIC BLOOD PRESSURE: 108 MMHG | TEMPERATURE: 97.88 F

## 2017-11-18 VITALS — SYSTOLIC BLOOD PRESSURE: 105 MMHG | DIASTOLIC BLOOD PRESSURE: 52 MMHG | HEART RATE: 51 BPM

## 2017-11-18 VITALS — HEART RATE: 54 BPM | OXYGEN SATURATION: 95 % | SYSTOLIC BLOOD PRESSURE: 72 MMHG | DIASTOLIC BLOOD PRESSURE: 39 MMHG

## 2017-11-18 VITALS
HEIGHT: 63 IN | WEIGHT: 191.8 LBS | BODY MASS INDEX: 33.98 KG/M2 | BODY MASS INDEX: 33.98 KG/M2 | BODY MASS INDEX: 33.98 KG/M2 | HEIGHT: 63 IN | WEIGHT: 191.8 LBS

## 2017-11-18 VITALS — HEART RATE: 62 BPM | OXYGEN SATURATION: 96 %

## 2017-11-18 VITALS — OXYGEN SATURATION: 97 % | HEART RATE: 60 BPM

## 2017-11-18 VITALS
SYSTOLIC BLOOD PRESSURE: 91 MMHG | OXYGEN SATURATION: 97 % | DIASTOLIC BLOOD PRESSURE: 78 MMHG | TEMPERATURE: 97.34 F | HEART RATE: 62 BPM

## 2017-11-18 VITALS
SYSTOLIC BLOOD PRESSURE: 104 MMHG | HEART RATE: 59 BPM | OXYGEN SATURATION: 96 % | TEMPERATURE: 97.7 F | DIASTOLIC BLOOD PRESSURE: 62 MMHG

## 2017-11-18 VITALS — HEART RATE: 54 BPM

## 2017-11-18 VITALS — OXYGEN SATURATION: 96 %

## 2017-11-18 VITALS — SYSTOLIC BLOOD PRESSURE: 83 MMHG | DIASTOLIC BLOOD PRESSURE: 38 MMHG | OXYGEN SATURATION: 96 % | HEART RATE: 54 BPM

## 2017-11-18 VITALS — OXYGEN SATURATION: 93 % | HEART RATE: 73 BPM

## 2017-11-18 VITALS — SYSTOLIC BLOOD PRESSURE: 84 MMHG | HEART RATE: 55 BPM | DIASTOLIC BLOOD PRESSURE: 40 MMHG | OXYGEN SATURATION: 95 %

## 2017-11-18 VITALS — OXYGEN SATURATION: 99 %

## 2017-11-18 VITALS — HEART RATE: 54 BPM | SYSTOLIC BLOOD PRESSURE: 93 MMHG | OXYGEN SATURATION: 97 % | DIASTOLIC BLOOD PRESSURE: 42 MMHG

## 2017-11-18 DIAGNOSIS — Z79.84: ICD-10-CM

## 2017-11-18 DIAGNOSIS — E10.10: Primary | ICD-10-CM

## 2017-11-18 DIAGNOSIS — E10.65: ICD-10-CM

## 2017-11-18 DIAGNOSIS — J96.22: ICD-10-CM

## 2017-11-18 DIAGNOSIS — Z79.52: ICD-10-CM

## 2017-11-18 DIAGNOSIS — E87.5: ICD-10-CM

## 2017-11-18 DIAGNOSIS — Z87.891: ICD-10-CM

## 2017-11-18 DIAGNOSIS — J44.1: ICD-10-CM

## 2017-11-18 DIAGNOSIS — Z79.899: ICD-10-CM

## 2017-11-18 DIAGNOSIS — N18.3: ICD-10-CM

## 2017-11-18 DIAGNOSIS — G25.81: ICD-10-CM

## 2017-11-18 DIAGNOSIS — N17.9: ICD-10-CM

## 2017-11-18 DIAGNOSIS — E87.1: ICD-10-CM

## 2017-11-18 DIAGNOSIS — Z99.81: ICD-10-CM

## 2017-11-18 DIAGNOSIS — Z66: ICD-10-CM

## 2017-11-18 DIAGNOSIS — Z79.891: ICD-10-CM

## 2017-11-18 DIAGNOSIS — F32.9: ICD-10-CM

## 2017-11-18 DIAGNOSIS — I48.91: ICD-10-CM

## 2017-11-18 DIAGNOSIS — I50.33: ICD-10-CM

## 2017-11-18 DIAGNOSIS — R32: ICD-10-CM

## 2017-11-18 DIAGNOSIS — I11.0: ICD-10-CM

## 2017-11-18 DIAGNOSIS — E78.5: ICD-10-CM

## 2017-11-18 LAB
ALBUMIN/GLOB SERPL: 1.1 {RATIO} (ref 0.9–2)
ALP SERPL-CCNC: 68 U/L (ref 45–117)
ALT SERPL-CCNC: 24 U/L (ref 12–78)
ANION GAP SERPL CALC-SCNC: 11 MMOL/L (ref 3–11)
ANION GAP SERPL CALC-SCNC: 16 MMOL/L (ref 3–11)
ANION GAP SERPL CALC-SCNC: 9 MMOL/L (ref 3–11)
AST SERPL-CCNC: 8 U/L (ref 15–37)
BASOPHILS # BLD: 0 K/UL (ref 0–0.2)
BASOPHILS NFR BLD: 0 %
BUN SERPL-MCNC: 127 MG/DL (ref 7–18)
BUN SERPL-MCNC: 139 MG/DL (ref 7–18)
BUN SERPL-MCNC: 142 MG/DL (ref 7–18)
BUN/CREAT SERPL: 34.8 (ref 10–20)
BUN/CREAT SERPL: 36.8 (ref 10–20)
BUN/CREAT SERPL: 37.4 (ref 10–20)
CALCIUM SERPL-MCNC: 7.9 MG/DL (ref 8.5–10.1)
CALCIUM SERPL-MCNC: 8.1 MG/DL (ref 8.5–10.1)
CALCIUM SERPL-MCNC: 8.6 MG/DL (ref 8.5–10.1)
CHLORIDE SERPL-SCNC: 100 MMOL/L (ref 98–107)
CHLORIDE SERPL-SCNC: 100 MMOL/L (ref 98–107)
CHLORIDE SERPL-SCNC: 98 MMOL/L (ref 98–107)
CO2 SERPL-SCNC: 22 MMOL/L (ref 21–32)
CO2 SERPL-SCNC: 26 MMOL/L (ref 21–32)
CO2 SERPL-SCNC: 26 MMOL/L (ref 21–32)
COMPLETE: YES
CREAT CL PREDICTED SERPL C-G-VRATE: 13.8 ML/MIN
CREAT CL PREDICTED SERPL C-G-VRATE: 14.8 ML/MIN
CREAT SERPL-MCNC: 3.46 MG/DL (ref 0.6–1.2)
CREAT SERPL-MCNC: 3.71 MG/DL (ref 0.6–1.2)
CREAT SERPL-MCNC: 4.09 MG/DL (ref 0.6–1.2)
EOSINOPHIL NFR BLD AUTO: 106 K/UL (ref 130–400)
GLOBULIN SER-MCNC: 3 GM/DL (ref 2.5–4)
GLUCOSE SERPL-MCNC: 170 MG/DL (ref 70–99)
GLUCOSE SERPL-MCNC: 261 MG/DL (ref 70–99)
GLUCOSE SERPL-MCNC: 98 MG/DL (ref 70–99)
HCT VFR BLD CALC: 36.8 % (ref 37–47)
IG%: 0.5 %
IMM GRANULOCYTES NFR BLD AUTO: 2.9 %
INR PPP: 1 (ref 0.9–1.1)
LYMPHOCYTES # BLD: 0.25 K/UL (ref 1.2–3.4)
MAGNESIUM SERPL-MCNC: 2.4 MG/DL (ref 1.8–2.4)
MCH RBC QN AUTO: 30.6 PG (ref 25–34)
MCHC RBC AUTO-ENTMCNC: 31.8 G/DL (ref 32–36)
MCV RBC AUTO: 96.3 FL (ref 80–100)
MONOCYTES NFR BLD: 0.9 %
NEUTROPHILS # BLD AUTO: 0.6 %
NEUTROPHILS NFR BLD AUTO: 95.1 %
PMV BLD AUTO: 11.6 FL (ref 7.4–10.4)
POTASSIUM SERPL-SCNC: (no result) MMOL/L (ref 3.5–5.1)
POTASSIUM SERPL-SCNC: 5.3 MMOL/L (ref 3.5–5.1)
POTASSIUM SERPL-SCNC: 5.5 MMOL/L (ref 3.5–5.1)
PROTHROMBIN TIME: 10.2 SECONDS (ref 9–12)
RBC # BLD AUTO: 3.82 M/UL (ref 4.2–5.4)
SODIUM SERPL-SCNC: 133 MMOL/L (ref 136–145)
SODIUM SERPL-SCNC: 137 MMOL/L (ref 136–145)
SODIUM SERPL-SCNC: 138 MMOL/L (ref 136–145)
WBC # BLD AUTO: 8.77 K/UL (ref 4.8–10.8)

## 2017-11-18 RX ADMIN — IPRATROPIUM BROMIDE AND ALBUTEROL SULFATE SCH ML: .5; 3 SOLUTION RESPIRATORY (INHALATION) at 15:25

## 2017-11-18 RX ADMIN — ROPINIROLE HYDROCHLORIDE SCH MG: 1 TABLET, FILM COATED ORAL at 07:44

## 2017-11-18 RX ADMIN — HEPARIN SODIUM SCH UNIT: 10000 INJECTION, SOLUTION INTRAVENOUS; SUBCUTANEOUS at 19:46

## 2017-11-18 RX ADMIN — INSULIN ASPART SCH UNITS: 100 INJECTION, SOLUTION INTRAVENOUS; SUBCUTANEOUS at 20:56

## 2017-11-18 RX ADMIN — IPRATROPIUM BROMIDE AND ALBUTEROL SULFATE SCH ML: .5; 3 SOLUTION RESPIRATORY (INHALATION) at 07:34

## 2017-11-18 RX ADMIN — IPRATROPIUM BROMIDE AND ALBUTEROL SULFATE SCH ML: .5; 3 SOLUTION RESPIRATORY (INHALATION) at 04:53

## 2017-11-18 RX ADMIN — INSULIN ASPART SCH UNITS: 100 INJECTION, SOLUTION INTRAVENOUS; SUBCUTANEOUS at 09:44

## 2017-11-18 RX ADMIN — NYSTATIN PRN ML: 100000 SUSPENSION ORAL at 20:58

## 2017-11-18 RX ADMIN — AMIODARONE HYDROCHLORIDE SCH MG: 200 TABLET ORAL at 07:43

## 2017-11-18 RX ADMIN — MIRABEGRON SCH MG: 25 TABLET, FILM COATED, EXTENDED RELEASE ORAL at 07:43

## 2017-11-18 RX ADMIN — BECLOMETHASONE DIPROPIONATE HFA SCH PUFFS: 80 AEROSOL, METERED RESPIRATORY (INHALATION) at 19:42

## 2017-11-18 RX ADMIN — INSULIN ASPART SCH UNITS: 100 INJECTION, SOLUTION INTRAVENOUS; SUBCUTANEOUS at 07:30

## 2017-11-18 RX ADMIN — BUDESONIDE AND FORMOTEROL FUMARATE DIHYDRATE SCH PUFFS: 160; 4.5 AEROSOL RESPIRATORY (INHALATION) at 19:43

## 2017-11-18 RX ADMIN — IPRATROPIUM BROMIDE AND ALBUTEROL SULFATE SCH ML: .5; 3 SOLUTION RESPIRATORY (INHALATION) at 20:15

## 2017-11-18 RX ADMIN — DEXTROSE AND SODIUM CHLORIDE SCH MLS/HR: 5; 900 INJECTION, SOLUTION INTRAVENOUS at 11:25

## 2017-11-18 RX ADMIN — ROPINIROLE HYDROCHLORIDE SCH MG: 1 TABLET, FILM COATED ORAL at 19:43

## 2017-11-18 RX ADMIN — PRAVASTATIN SODIUM SCH MG: 10 TABLET ORAL at 19:45

## 2017-11-18 RX ADMIN — ROPINIROLE HYDROCHLORIDE SCH MG: 1 TABLET, FILM COATED ORAL at 13:38

## 2017-11-18 RX ADMIN — INSULIN ASPART SCH UNITS: 100 INJECTION, SOLUTION INTRAVENOUS; SUBCUTANEOUS at 17:54

## 2017-11-18 RX ADMIN — BUDESONIDE AND FORMOTEROL FUMARATE DIHYDRATE SCH PUFFS: 160; 4.5 AEROSOL RESPIRATORY (INHALATION) at 07:42

## 2017-11-18 RX ADMIN — HEPARIN SODIUM SCH UNIT: 10000 INJECTION, SOLUTION INTRAVENOUS; SUBCUTANEOUS at 07:41

## 2017-11-18 RX ADMIN — FLUOXETINE SCH MG: 20 CAPSULE ORAL at 07:43

## 2017-11-18 RX ADMIN — INSULIN ASPART SCH UNITS: 100 INJECTION, SOLUTION INTRAVENOUS; SUBCUTANEOUS at 12:31

## 2017-11-18 RX ADMIN — TRAZODONE HYDROCHLORIDE SCH MG: 50 TABLET ORAL at 22:26

## 2017-11-18 RX ADMIN — Medication SCH MG: at 07:44

## 2017-11-18 RX ADMIN — BECLOMETHASONE DIPROPIONATE HFA SCH PUFFS: 80 AEROSOL, METERED RESPIRATORY (INHALATION) at 07:44

## 2017-11-18 RX ADMIN — METOPROLOL SUCCINATE SCH MG: 50 TABLET, EXTENDED RELEASE ORAL at 19:40

## 2017-11-18 RX ADMIN — DEXTROSE AND SODIUM CHLORIDE SCH MLS/HR: 5; 900 INJECTION, SOLUTION INTRAVENOUS at 04:53

## 2017-11-18 NOTE — CRITICAL CARE CONSULTATION
Critical Care Consultation


Date of Consultation:


Nov 18, 2017.


Attending Physician:


Lalito Prajapati M.D.


Reason for Consultation:


Patient transferred to [facility] via [facility] secondary to 

DKA with blood sugars greater than 600 requiring insulin drip, hyperkalemia, 

and hyponatremia.


History of Present Illness


Patient is a 72-year-old female with a significant past medical history of COPD

, diabetes mellitus, CHF, hypertension, and hyperlipidemia who presents to the 

ICU via [facility] from [facility] for suspected DKA with blood sugars greater 

than 600 with hyponatremia at 125 and hyperkalemia with a potassium of 6.6.  

She is currently on a prednisone taper after being admitted at this facility 

for a pneumonia and hypercapnic respiratory failure.  While at [facility], she 

received 2 L of normal saline, 10 units regular insulin, insulin drip, 20 mg 

Lasix, and 30 g of Kayexalate.  This patient was felt to be too complex for 

their facility requiring transfer to higher level care.  Patient was accepted 

to our facility for ICU status for closer monitoring.





Patient reports that after her stay in our facility, she was discharged to 

[facility] for rehabilitation.  After that, she was discharged to a care home 

in [place].  She was just discharged from [facility] on Thursday.  This 

evening and last evening, she noticed that her blood sugars have been elevated 

at greater than 500.  This evening, her blood glucose was found to be greater 

than 600.  She was subsequently directed to the [facility] ER for further 

evaluation.  The patient reports that other than some bilateral leg weakness, 

she reports that she has been doing well since her time of discharge.  She 

denies any associated headaches, dizziness, lightheadedness, chest pain, 

palpitations, short of breath, productive cough, nausea, vomiting, abdominal 

pain, polydipsia, polyuria, polyphagia, diarrhea, or recent falls.  The patient 

otherwise reports feeling okay.  Patient denies any pain rating her discomfort 

a 0/10.





Past Medical/Surgical History


Medical Problems:


(1) CHF (congestive heart failure)


(2) CHF exacerbation


(3) Diab Sanjuana Wo Compl, Type Ii Or Unspec Type, Uncontrolled


(4) DKA (diabetic ketoacidoses)


(5) Hyperlipidemia Nec/Nos


(6) Hypertension Nos


(7) Hypoxemia


(8) Influenza A


(9) Oral candidiasis


(10) Pneumonia


(11) Renal failure


(12) Respiratory failure, acute-on-chronic





Family History





Cancer


Diabetes mellitus


FH: heart disease


Noncontributory





Social History


Smoking Status:  Former Smoker


Smokeless Tobacco Use:  No


Alcohol Use:  none


Drug Use:  none


Marital Status:  single, 


Housing Status:  nursing home


Occupation Status:  retired





Allergies


Coded Allergies:  


     Atropine (Verified  Allergy, Intermediate, RASH, 10/27/17)


     Dobutamine (Verified  Allergy, Intermediate, RASH, 10/27/17)





Home Medications


Scheduled


Amiodarone Hcl (Cordarone), 200 MG PO DAILY


Beclomethasone Dip (Qvar), 80 MCG INH BID


Budesonide/Formoterol Fumarate (Symbicort 160-4.5 Mcg/Act), 2 PUFFS INH BID


Cyanocobalamin (Vitamin B12 500MCG), 1,000 MCG PO DAILY


Ferrous Sulfate (Kp Ferrous Sulfate), 1 TAB PO DAILY


Fluoxetine Hcl (Prozac), 20 MG PO QAM


Furosemide (Furosemide), 80 MG PO DAILY


Glipizide (Glipizide), 5 MG PO BID


Ipratropium-Albuterol (Duoneb), 1 TREATMENT INH Q6HWA


Loratadine (Claritin), 10 MG PO QAM


Melatonin (Melatonin Maximum Strengt), 1 TAB PO HS


Metoprolol Succ (Toprol Xl) (Toprol-Xl), 1 TAB PO PM


Mirabegron (Myrbetriq Er), 25 MG PO DAILY


Nystatin (Nystatin), 5 ML PO QID


Potassium Ext Rel (Klor-Con), 20 MEQ PO DAILY


Pravastatin Sodium (Pravastatin Sodium), 10 MG PO QPM


Prednisone (Prednisone), 15 MG PO DAILY


Prednisone (Prednisone), 20 MG PO DAILY


Prednisone (Prednisone), 5 MG PO DAILY


Prednisone (Prednisone), 10 MG PO DAILY


Ropinirole (Requip), 2 MG PO TID


Trazodone Hcl (Trazodone), 25 MG PO HS





Current Inpatient Medications





Current Inpatient Medications








 Medications


  (Trade)  Dose


 Ordered  Sig/Roya


 Route  Start Time


 Stop Time Status Last Admin


Dose Admin


 


 Heparin Sodium


  (Porcine)


  (Heparin Sq 5000


 Unit/0.5ml)  5,000 unit  Q12H


 SQ  11/18/17 02:30


 12/18/17 02:29 UNV  


 


 


 Acetaminophen


  (Tylenol Tab)  650 mg  Q4H  PRN


 PO  11/18/17 02:30


 12/18/17 02:29   


 


 


 Morphine Sulfate


  (MoRPHine


 SULFATE INJ)  2 mg  Q2H  PRN


 IV  11/18/17 02:30


 12/2/17 02:29   


 


 


 Amiodarone HCl


  (Cordarone Tab)  200 mg  BID


 PO  11/18/17 09:00


 12/18/17 08:59   


 


 


 Beclomethasone


 Dipropionate


  (Qvar 80 Mcg Hfa


 Inhaler)  2 puffs  BID


 INH  11/18/17 09:00


 12/18/17 08:59   


 


 


 Budesonide/


 Formoterol


 Fumarate


  (Symbicort 160/


 4.5 Inh)  2 puffs  BID


 INH  11/18/17 09:00


 12/18/17 08:59   


 


 


 Fluoxetine HCl


  (Prozac Cap)  20 mg  QAM


 PO  11/18/17 09:00


 12/18/17 08:59   


 


 


 Furosemide


  (Lasix Tab)  80 mg  DAILY


 PO  11/18/17 09:00


 12/18/17 08:59   


 


 


 Albuterol/


 Ipratropium


  (Duoneb)  0.5 ml  Q6RWA


 INH  11/18/17 09:00


 12/18/17 08:59   


 


 


 Lisinopril


  (Zestril Tab)  10 mg  DAILY


 PO  11/18/17 09:00


 12/18/17 08:59   


 


 


 Loratadine


  (Claritin Tab)  10 mg  QAM


 PO  11/18/17 09:00


 12/18/17 08:59   


 


 


 Metoprolol


 Succinate


  (Toprol Xl Tab)  100 mg  QPM


 PO  11/18/17 21:00


 12/18/17 20:59   


 


 


 Mirabegron


  (Myrbetriq Er)  25 mg  DAILY


 PO  11/18/17 09:00


 12/18/17 08:59   


 


 


 Nystatin


  (Mycostatin Susp)  5 ml  QID  PRN


 PO  11/18/17 02:45


 11/28/17 02:44   


 


 


 Pravastatin Sodium


  (Pravachol Tab)  10 mg  QPM


 PO  11/18/17 21:00


 12/18/17 20:59   


 


 


 Ropinirole HCl


  (Requip Tab)  2 mg  TID


 PO  11/18/17 09:00


 12/18/17 08:59   


 


 


 Spironolactone


  (Aldactone Tab)  25 mg  BID17


 PO  11/18/17 09:00


 12/18/17 08:59   


 


 


 Insulin Aspart


  (novoLOG ASPART)  SLIDING


 [facility]  [facility]  11/18/17 08:00


 12/18/17 07:59   


 


 


 Miscellaneous


 Information


  (Consult


 Glycemic


 Management


 Pharmacy)  1 ea  UD  PRN


 N/A  11/18/17 02:45


 12/18/17 02:44   


 


 


 Sodium Chloride  1,000 ml @ 


 125 mls/hr  Q8H


 IV  11/18/17 04:00


 11/19/17 03:59  11/18/17 03:20


125 MLS/HR


 


 Insulin Human


 Regular 250 units/


 Sodium Chloride  252.5 ml @ 


 0 mls/hr  Q24H


 IV  11/18/17 04:00


 12/18/17 03:59   


 


 


 Miscellaneous


 Information


  (Patient'S


 Height And/Or


 Weight Needed)  1 ea  Q2H


 N/A  11/18/17 03:00


 12/18/17 02:59   


 











Review of Systems


A complete 10-point Review of Systems was discussed with the patient, with 

pertinent positives and negatives listed in the History of Present Illness. All 

remaining Review of Systems questions can be considered negative unless 

otherwise specified.





Physical Exam


VITAL SIGNS - Vital signs and nursing notes were reviewed.


GENERAL - 72-year-old female appearing her stated age who is in no acute 

distress. Communicates well with provider and answers questions appropriately.


HEAD - NC/AT.


EYES - PERRL with EOMI bilaterally. Sclera anicteric. Palpebral conjunctiva 

pink and moist with no injection noted.


EARS - No deformities of external structures noted on gross examination 

bilaterally. 


NOSE - Midline and without cyanosis. No epistaxis or purulent drainage noted.


MOUTH/OROPHARYNX - Without perioral cyanosis. Buccal mucosa pink and dry and 

without leukoplakia.


NECK - Neck with FROM. Supple to palpation.


LUNGS - Chest wall symmetric without accessory muscle use, intercostals 

retractions, or central cyanosis. Normal vesicular breath sounds CTA B/L. No 

wheezes, rales, or rhonchi appreciated.


CARDIAC - RRR with S1/S2. No murmur, rubs, or gallops appreciated. No 

reproducible tenderness to palpation appreciated over the anterior chest wall.


ABDOMEN - Abdominal contour obese and without pulsations or visible masses. BS 

normoactive all four quadrants. No tenderness, palpable masses, 

hepatosplenomegaly, or ascites noted.


EXTREMITIES - No clubbing or peripheral cyanosis. Trace pretibial edema present 

bilaterally. +3/5 radial and dorsalis pedis pulses palpated throughout. +5/5 

strength noted in UE/LE bilaterally.


NEUROLOGIC - Cranial nerves II through XII grossly intact. Sensory intact to 

light touch throughout.


PSYCH - A&Ox3 and cooperates fully with examiner. Pt is very pleasant and 

interacts well with examiner.





Laboratory Results





Last 24 Hours








Test


  11/18/17


02:49


 


White Blood Count 8.77 K/uL 


 


Red Blood Count 3.82 M/uL 


 


Hemoglobin 11.7 g/dL 


 


Hematocrit 36.8 % 


 


Mean Corpuscular Volume 96.3 fL 


 


Mean Corpuscular Hemoglobin 30.6 pg 


 


Mean Corpuscular Hemoglobin


Concent 31.8 g/dl 


 


 


Platelet Count 106 K/uL 


 


Mean Platelet Volume 11.6 fL 


 


Neutrophils (%) (Auto) 95.1 % 


 


Lymphocytes (%) (Auto) 2.9 % 


 


Monocytes (%) (Auto) 0.9 % 


 


Eosinophils (%) (Auto) 0.6 % 


 


Basophils (%) (Auto) 0.0 % 


 


Neutrophils # (Auto) 8.35 K/uL 


 


Lymphocytes # (Auto) 0.25 K/uL 


 


Monocytes # (Auto) 0.08 K/uL 


 


Eosinophils # (Auto) 0.05 K/uL 


 


Basophils # (Auto) 0.00 K/uL 


 


RDW Standard Deviation 50.4 fL 


 


RDW Coefficient of Variation 14.2 % 


 


Immature Granulocyte % (Auto) 0.5 % 


 


Immature Granulocyte # (Auto) 0.04 K/uL 











Diagnostic Results


Chest x-ray was obtained and reviewed by myself.  It was compared to priors.  

There is atelectasis noted to the LEFT-sided lower base which is unchanged from 

prior x-ray on 1/11/2017.  Radiologist's impression unavailable at the time of 

dictation.





Assessment & Plan





(1) DKA (diabetic ketoacidoses)


(2) Renal failure


(3) CHF exacerbation


(4) Diab Sanjuana Wo Compl, Type Ii Or Unspec Type, Uncontrolled


(5) Hypertension Nos


(6) COPD (chronic obstructive pulmonary disease)


Reason Critically Ill: Patient transferred to [facility] via 

[facility] secondary to DKA with blood sugars greater than 600 requiring insulin 

drip, hyperkalemia, and hyponatremia. Hypotension with blood pressures in the 

90s over 40s.





Neuro -


* CAM ICU: NEGATIVE


* Restless Leg Syndrome - continue Requip.


* Depression - Continue Prozac


* Trazodone for sleep.





Cardiac -


* h/o CHF, HTN, Dyslipidemia:


 * Continue home Rx as BP tolerates.


* EKG demonstrates NSR at 69 bpm. QTc 432ms.


* ECHO (8/31/2017):


 * The left ventricle is borderline dilated.


 * There is mild concentric left ventricular hypertrophy.


 * Left ventricular systolic function is normal.


 * Grade I diastolic dysfunction, (abnormal relaxation pattern).


 * Borderline left atrial enlargement.


 * There is mild mitral regurgitation.


 * Right ventricular systolic pressure is elevated at 50-60mmHg.


 * Compared to an echocardiogram performed in March 2016, there is minimal 

change


* Hypotension - Acute:


 * In the setting of JANES and Uremia.


 * Normotensive on arrival.


 * IVF resuscitation 2/2 ??HHS/DKA - Judicious resuscitation w/ h/o CHF. 

Patient clinically dehydrated. Will monitor for s/s of fluid overload.


 * Vasopressors if needed.


* Initial Troponin NEGATIVE - will repeat.


* Monitor or telemetry in the setting of HYPERkalemia.


* EKGs for any chest pain.





Respiratory -


* h/o COPD and recent pneumonia w/ hypercarbic respiratory failure.


 * Continue with home medications/inhalers.


 * No complaints at this time.


 * BiPAP while sleeping and for any s/s of pulmonary edema. Home settings of 15/

5.


 * O2 requirement of 3L NC at baseline.


* ABG from outside facility: pH 7.29/pCO2 55/pO2 86/HCO3 24


 * Lends itself to a Metabolic Acidosis. I suspect this patient to have a 

higher baseline CO2 2/2 to chronic lung disease.


* CXR was obtained and reviewed from priors and demonstrates no new findings 

per my interpretation.


* Currently on Prednisone taper:


 * Would continue at this point as the patient's Hyperglycemia is likely 2/2 

dehydration versus steroid use. In the setting of such poor lung disease, I 

anticipate a worse decline in her lung function w/o the use of corticosteroids.





GI - 


* DM diet.


   


RENAL/LYTES -


* Hyponatremia at 125 initially:


 * Corrected Na 133.


 * Continue with NSS for now.


* Hyperkalemia at 6.6 initially:


 * Initially received 20IV Lasix and 30g Oral Kayexalate at outside facility.


 * I anticipate the need for repletion as the patient received a large dose of 

IV insulin and an insulin gtt.


 * She has no EKG findings at this time of concern.


 * Anticipate natural correction with intracellular flow w/ current treatment.


* JANES w/ BUN/Cr of 139/4.4:


 * Suggestive of prerenal state.


 * Patient has had increasing diuretic dosing and a fluid restriction 2/2 

pulmonary edema.


 * Suspect this is the main causative factor of her JANES and subsequent 

hyperglycemia.


 * Fluid resuscitation.


* IVF of NSS @150mL/hr initially.


 * Will adjust fluids and add glucose/K+ as needed.





 - 


* Short Catheter in place.


* Strict I&Os.





ENDO - 


* Hyposmolar Hyperglycemia Nonketotic Syndrome:


 * Patient with a known history of type 2 diabetes on glipizide alone.  Recent 

stressor of steroid taper with the addition of Lasix for volume control 

secondary to pulmonary edema.


 * Her urine was unremarkable for ketones.  No beta hydroxybutyric acid levels 

were obtained in the outside facility however her BSG was found to be greater 

than 600.


 * While she does have an acidosis per her ABG, I suspect this to be related to 

her uremia secondary to dehydration.  This patient likely has a baseline 

elevated CO2 secondary to COPD.  Currently at 55, this could represent the 

sequela of HHNS.


 * I believe her hypotension to be multifactorial secondary to intravascular 

disruption from HHS as well as volume depletion.


 * Serum osmolality greater than 320 suggestive of HHNS.


 * Regardless, treatment persists with aggressive IVF resuscitation, Blood 

glucose management, and electrolyte balance.


 * Initially placed on insulin gtt, will plan to titrate patient's BSG slowly.


 * Will be judicious with IV fluid replacement secondary to patient's history 

of CHF and pulmonary edema.


 * Will monitor serial VBGs.


 * Thankfully, the patient is without s/s of neurological involvement.





HEME -


* Stable H&H:


 * Will monitor daily.





ID -


* No concerns for infection at this time.


* Will monitor fever curve.





LINES/IV ACCESS - 


* PIVs intact.


* Short Catheter in place





DVT PROPHYLAXIS -


* Heparin sq BID





I have personally spent 40 minutes of critical care time in the direct 

management of this patient. This is a life/limb threatening event. This 

includes time spent evaluating patient, direct bedside care, chart review, 

placing orders, interpretation of diagnostic studies, discussion with 

consultants, patient, and family members, as well as other required patient 

management activities. 


This time is exclusive of all separately billable procedures, and teaching time 

and separate from and in addition to any other critical care service time.





Thank you for this consultation allow us to be part of this patient's care.  

Please refer to my attending physician's documentation for any further 

recommendations.





Physician Supervision Note:





I discussed the case with Angel Luis Chin PA-C and agree with the findings and plan 

as documented in the note.  I also performed an interview and a physical on the 

patient. 





This is a pleasant 72 year-old female, known to me from prior admission in May 

2017, with advanced COPD, severe chronic CO2 retention, pulmonary HTN, CKD, DM2 

was sent from [facility] for hyperglycemia, suspected DKA, glucose over 600. 

Unclear what triggered it, she was receiving the medication from her nurse at 

[facility], denies excessive thirst, excessive urination, lethargy, fever or 

chills, no hyperventilation. She is on a steroid taper, but she almost always 

on steroids for her COPD


Was on insulin drip initially, now off, actually was hypoglycemic briefly, 

requiring D5 infusion.


The patient has however subacute and chronic complaints, she states that her 

tremors are much worse for the past three weeks and has severe weakness, unable 

to get up from chair and ambulate after. 


Of note, she was started on amiodarone for paroxysmal a-fib about three weeks 

ago





General: Awake and alert, comfortable


HEENT: NC/AT, dry oral mucosa


Lungs: diminished breath sounds b/l, no wheezing 


CVS: S1S2 regular


Abd: Soft, Nt, ND


Ext: B/l LE edema, but dry skin


CNS: AAO x 3, resting and intentional tremor. To me she has great isometric 

strength b/l.





Imp:


Uncontrolled hyperglycemia, not DKA


Severe COPD


Acute on chronic kidney disease


DM-2


Tremors


Paroxysmal a-fib





Plan: 


At this point I believe she should be switched from oral agents to insulin for 

management of DM, she has fluctuations on her renal function


Off insulin drip. She was not in DKA given her labs and her symptoms.


Diabetic education to learn how to administer the insulin





Continue on Prednisone 20 mg. Given her history, I recommend pulmonary consult, 

probably requires a very long taper and a basal dose of Prednisone chronically.


I started her last time on Daliresp, but she discontinued it on her own, did 

not see an improvement, and developed some side effects but cannot remember 

what they were


Continue Symbicort and bronchodilators, DuoNeb





Recommend a neurology consult for her new weakness and tremors. CPK is 19. 

Doubt Parkinson's disease


Amiodarone may cause tremors per side-effect profile and it fits as time-event 

relationship. Would have cardiology weigh in, to see if she can be taken off, 

maybe started on diltiazem, may have have some rhythm control ability too. 


OOB to chair


Physical therapy





Has worsening renal failure. At this point will hold the ACE inhibitor, stop 

the diuretics, may be a little overdiuresed now.


Renal evaluation. If she continues to worsen, may require hemodialysis





Encourage PO intake





DVT prophylaxis with SC heparin, SCDs





Critical care time spent with the patient and her daughter, reviewing the chart

, discussing with the primary team, greater than 60 minutes





Documented By:  Micheal Velasquez MD

## 2017-11-18 NOTE — PHARMACY PROGRESS NOTE
Glycemic Control Intl Consult


Date of Service


Nov 18, 2017.





Scope


Glycemic Pharmacist consulted by Dr Hernandez on 11/17/17 for glycemic control 

and to write orders per [facility] inpatient glycemic control protocol





Objective


Weight (Kilograms):  85.400


Accuchecks BSG (last 24hrs):











Test


  11/18/17


02:49 11/18/17


04:05 11/18/17


04:34 11/18/17


04:59


 


Random Glucose


  261 mg/dl


(70-99) 


  


  


 


 


Bedside Glucose


  


  112 mg/dl


(70-90) 74 mg/dl


(70-90) 104 mg/dl


(70-90)


 


Test


  11/18/17


05:21 11/18/17


07:58 


  


 


 


Random Glucose


  98 mg/dl


(70-99) 


  


  


 


 


Bedside Glucose


  


  90 mg/dl


(70-90) 


  


 








Laboratory Data (last 24hrs)











Test


  11/18/17


02:49 11/18/17


05:21


 


Anion Gap 9.0 mmol/L  11.0 mmol/L 


 


BUN/Creatinine Ratio 34.8  37.4 


 


Blood Urea Nitrogen 142 mg/dl  139 mg/dl 


 


Creatinine 4.09 mg/dl  3.71 mg/dl 


 


Potassium Level 5.5 mmol/L  5.3 mmol/L 


 


Sodium Level 133 mmol/L  137 mmol/L 


 


White Blood Count 8.77 K/uL  


 


Red Blood Count 3.82 M/uL  


 


Hemoglobin 11.7 g/dL  


 


Hematocrit 36.8 %  


 


Mean Corpuscular Volume 96.3 fL  


 


Mean Corpuscular Hemoglobin 30.6 pg  


 


Mean Corpuscular Hemoglobin


Concent 31.8 g/dl 


  


 


 


Platelet Count 106 K/uL  


 


Mean Platelet Volume 11.6 fL  


 


Neutrophils (%) (Auto) 95.1 %  


 


Lymphocytes (%) (Auto) 2.9 %  


 


Monocytes (%) (Auto) 0.9 %  


 


Eosinophils (%) (Auto) 0.6 %  


 


Basophils (%) (Auto) 0.0 %  


 


Neutrophils # (Auto) 8.35 K/uL  


 


Lymphocytes # (Auto) 0.25 K/uL  


 


Monocytes # (Auto) 0.08 K/uL  


 


Eosinophils # (Auto) 0.05 K/uL  


 


Basophils # (Auto) 0.00 K/uL  








HbA1c


7.1% on 10/29/17





Recent Pertinent Medications


Outpatient Anti-diabetic Regimen: 


* Glipizide 5mg PO BIDM











Risk Factors for Insulin Resistance:


* Steroids {hydrocortisone 50mg IV x 1 dose}


* Stress/illness





Assessment & Plan


ASSESSMENT:


* 71yo T2DM female well known to pharmacy per previous admissions/glycemic 

consults. Most recently in May 2017 and earlier this month. 


* Patient's insulin needs vary during admission depending on steroid dosing. Pt 

typically requires 60-80 units of SQ insulin while on high dose RTC dosing of 

Solumedrol. 


* Anticipate that patient's needs will be significantly less at this time - pt 

is only receiving one dose of steroids and is NPO. 


 * Pt with severe hyperglycemia on admission which corrected quickly with IV 

insulin and hydration {in addition to what was given at [facility]}. 


 * Pt has D5NS @ 150ml/hr running while NPO/low BSG





PLAN FOR INPATIENT GLYCEMIC CONTROL:


* Hold outpatient oral diabetes medications





* Start IV insulin infusion per ICU hyperglycemia protocol IF BSG >220 mg/dl x 

1 


 * Goal Range 100 - 180 mg/dl


 * In the critical care setting, continuous IV insulin infusion has been shown 

to be the best method for achieving glycemic targets. 








* Basal insulin 


 * Hold off at this time as all BSGs are ~ 100 mg/dl or below


 * If BSG >140 mg/dl will give a one time dose of NPH 10 units SQ as the 

kinetics of NPH match that of the hyperglycemic effects of once daily steroid 

dosing





* Bolus insulin: use parameters per previous admissions- titrate based on BSG 

trends.  


 * NovoLog per scale ACHS or Q6hrs while NPO


 * Goal Range:  Low 120 mg/dL - High 150 mg/dL


 * Correction Factor: 20 mg/dL/unit


 * Nutritional / Prandial insulin per carb ratio of 1 unit per 6 grams CHO 

consumed











* Please note that the plan above was derived based on current level of insulin 

resistance and hospital stress. These recommendations are appropriate for 

inpatient admission only. Plan of care upon discharge will need to be 

reassessed to avoid potential outpatient hypo/hyperglycemia. 





Thank you.

## 2017-11-18 NOTE — HISTORY AND PHYSICAL
History & Physical


Date & Time of Service:


Nov 18, 2017 at 02:40


Chief Complaint:


Dka, Acute Renal Failure


Primary Care Physician:


No Doctor, Assigned


History of Present Illness


Source:  patient, hospital records, EMS


Bonnie Jacobson is a 73 yo F with COPD and CHF who was recently discharged  for 

COPD exacerbation and new atrial fibrillation, who presented to [facility] 

earlier today with hyperglycemia. She reports her blood sugars have been high 

the last few days, in the 300-400 range. She reports she also has just not felt 

right since leaving the hospital. She has also been on a long steroid taper, 

but is unclear what dose she is on. She went to [facility] after discharge 

for a week of rehab and then to [facility] where she resides now. Her CO2 

today was 120 and BSG 600s. Her ABG at [facility] had a pH of 7.29. She was given 

2L IV fluids and 5 units of Insulin, and started on an insulin drip. 





She also reports in the last few weeks her legs have been weaker than usual, 

and she feels they give way. She denies any chest pain, shortness of breath, 

abdominal pain. Currently, she reports feeling well. She was transferred by 

helicopter to the ICU here. During her last admission, she was found to have a 

CO2 of over 100, taken to the ICU and started on BiPAP. She was not intubated. 

She also developed atrial fibrillation and was started on amiodarone. She was 

not started on anticoagulation due to falls.





Past Medical/Surgical History


Medical Problems:


(1) CHF (congestive heart failure)


Status: Chronic  





(2) CHF exacerbation


Status: Resolved  





(3) Diab Sanjuana Wo Compl, Type Ii Or Unspec Type, Uncontrolled


Status: Chronic  





(4) Hyperlipidemia Nec/Nos


Status: Chronic  





(5) Hypertension Nos


Status: Chronic  





(6) Respiratory failure, acute-on-chronic


Status: Chronic  








Family History





Cancer


Diabetes mellitus


FH: heart disease





Social History


Smoking Status:  Former Smoker


Smokeless Tobacco Use:  No


Alcohol Use:  none


Drug Use:  none


Marital Status:  single, 


Housing status:  lives alone


Occupational Status:  retired





Immunizations


History of Influenza Vaccine:  Yes


Influenza Vaccine Date:  Oct 17, 2013


History of Tetanus Vaccine?:  Yes


Tetanus Immunization Date:  Oct 8, 2013


History of Pneumococcal:  Yes


Pneumococcal Date:  Nov 17, 2010


History of Hepatitis B Vaccine:  No





Multi-Drug Resistant Organisms


History of MDRO:  No





Allergies


Coded Allergies:  


     Atropine (Verified  Allergy, Intermediate, RASH, 10/27/17)


     Dobutamine (Verified  Allergy, Intermediate, RASH, 10/27/17)





Home Medications


Scheduled


Amiodarone Hcl (Cordarone), 200 MG PO DAILY


Beclomethasone Dip (Qvar), 80 MCG INH BID


Budesonide/Formoterol Fumarate (Symbicort 160-4.5 Mcg/Act), 2 PUFFS INH BID


Cyanocobalamin (Vitamin B12 500MCG), 1,000 MCG PO DAILY


Ferrous Sulfate (Kp Ferrous Sulfate), 1 TAB PO DAILY


Fluoxetine Hcl (Prozac), 20 MG PO QAM


Furosemide (Furosemide), 80 MG PO DAILY


Glipizide (Glipizide), 5 MG PO BID


Ipratropium-Albuterol (Duoneb), 1 TREATMENT INH Q6HWA


Loratadine (Claritin), 10 MG PO QAM


Melatonin (Melatonin Maximum Strengt), 1 TAB PO HS


Metoprolol Succ (Toprol Xl) (Toprol-Xl), 1 TAB PO PM


Mirabegron (Myrbetriq Er), 25 MG PO DAILY


Nystatin (Nystatin), 5 ML PO QID


Potassium Ext Rel (Klor-Con), 20 MEQ PO DAILY


Pravastatin Sodium (Pravastatin Sodium), 10 MG PO QPM


Prednisone (Prednisone), 15 MG PO DAILY


Prednisone (Prednisone), 20 MG PO DAILY


Prednisone (Prednisone), 5 MG PO DAILY


Prednisone (Prednisone), 10 MG PO DAILY


Ropinirole (Requip), 2 MG PO TID


Trazodone Hcl (Trazodone), 25 MG PO HS





Review of Systems


See HPI for pertinent positives & negatives. A total of 10 systems reviewed and 

were otherwise negative.





Physical Exam


General Appearance:  WD/WN, no apparent distress


Head:  normocephalic, atraumatic


Eyes:  normal inspection, PERRL


ENT:  hearing grossly normal


Neck:  supple, no JVD


Respiratory/Chest:  lungs clear, normal breath sounds, no respiratory distress


Cardiovascular:  regular rate, rhythm, no murmur, normal peripheral pulses


Abdomen/GI:  normal bowel sounds, non tender, soft





Diagnostics


Laboratory Results





Results Past 24 Hours








Test


  11/18/17


02:30 Range/Units


 








Microbiology Results


11/18/17 MRSA DNA Surveillance Screen, Ordered


           Pending


CXR normal





Impression


Assessment and Plan


73 yo female, with end-stage COPD and CHF who presents with DKA, transferred 

from [facility].





DKA


- Continue NSS 100mL/hour


- Continue insulin drip per protocol


- Repeating labwork now, then will determine if need to get labs q4h


- Glipizide on hold - would consider cancelling entirely due to falls risk





Acute on chronic respiratory failure with hypercapnia


- Continue current o2 regime with BiPAP at night


- Hold her current prednisone taper, seems to be starting 20mg dose tomorrow. 

Will provide a stress dose of hydrocortisone 50mg at 8am. Day team to please r/

v. 


- Continue daily Lasix 80mg


- Continue inhalers





Chronic diastolic CHF


- I&O monitoring, gil already in place


- daily weights





CAD


- Continue statin, beta-blocker (Toprol XL 50mg daily). ACE-I stopped last 

admission due to JANES.





Atrial fibrillation


- Continue amiodarone 200mg daily 


- Pt refused long term anticoagulation last admission





Restless leg syndrome


- Continue ropinirole





Leg weakness


- Likely deconditioning, but will consider broader differentials after DKA has 

resolved


- PT/OT when able to





Depression


- Continue Prozac





Urinary incontinence


- Continue Mirabegron





VTE: Heparin SQ


Code status: DNR


Dispo: Admitted to ICU





Attending addendum:








I have physically seen this patient, have supervised the medical residents 

activities, and agree with the H&P unless as otherwise noted.





Assessment and Plan:





DKA/accepted in transfer from [facility]--


Order stat labs: CBC with differential, chemistry profile, magnesium, ABG, EKG, 

troponin, chest x-ray.


Continue insulin drip per protocol


For now place on normal saline 100 ML's per hour


Follow laboratories per protocol.





Acute on chronic respiratory failure with hypercapnia/CHF/atrial fibrillation 


BiPAP at bedtime


Continue metoprolol succinate 50 mg daily


Continue Lasix 80 mg by mouth daily


Continue amiodarone 200 mg daily





Restless leg syndrome--


Continue ropinirole





Level of Care


Critical Care





Advanced Directives


Existing Advance Directive:  Yes


Existing Living Will:  Yes


Existing Power of :  Yes





Resuscitation Status


DO NOT RESUSCITATE





VTE Prophylaxis


VTE Risk Assessment Done? Y/N:  Yes


Risk Level:  Moderate


Given or contraindicated:  SCD's





Resident Tracking


Resident Involvement:  Resident Care Provided


Care Provided:  Adult Hospital Medicine

## 2017-11-18 NOTE — FAMILY MEDICINE PROGRESS NOTE
Progress Note


Date of Service


Nov 18, 2017.





Subjective


Pt evaluation today including:  conversation w/ patient, physical exam, chart 

review, lab review, review of studies


Pt has not been ambulatory since prior to previous admission for hypercapnic 

resp failure. Patient says that she is incredibly weak. She was dc from [facility] on 

Nov. 8th and transferred to [facility] for rehab. She reports tolerating 

BIPAP well in the evenings.  Denies issues with breathing and mentation, but 

has had elevated blood glucose levels.





   Constitutional:  No fever, No chills, No sweats


   Respiratory:  No cough, No sputum, No shortness of breath


   Cardiovascular:  No chest pain, No edema, No palpitations


   Abdomen:  No pain, No nausea, No vomiting, No diarrhea





Medications





Current Inpatient Medications








 Medications


  (Trade)  Dose


 Ordered  Sig/Roya


 Route  Start Time


 Stop Time Status Last Admin


Dose Admin


 


 Heparin Sodium


  (Porcine)


  (Heparin Sq 5000


 Unit/0.5ml)  5,000 unit  Q12H


 SQ  11/18/17 09:00


 12/18/17 08:59  11/18/17 07:41


5,000 UNIT


 


 Acetaminophen


  (Tylenol Tab)  650 mg  Q4H  PRN


 PO  11/18/17 02:30


 12/18/17 02:29   


 


 


 Morphine Sulfate


  (MoRPHine


 SULFATE INJ)  2 mg  Q2H  PRN


 IV  11/18/17 02:30


 12/2/17 02:29   


 


 


 Beclomethasone


 Dipropionate


  (Qvar 80 Mcg Hfa


 Inhaler)  2 puffs  BID


 INH  11/18/17 09:00


 12/18/17 08:59  11/18/17 07:44


2 PUFFS


 


 Budesonide/


 Formoterol


 Fumarate


  (Symbicort 160/


 4.5 Inh)  2 puffs  BID


 INH  11/18/17 09:00


 12/18/17 08:59  11/18/17 07:42


2 PUFFS


 


 Fluoxetine HCl


  (Prozac Cap)  20 mg  QAM


 PO  11/18/17 09:00


 12/18/17 08:59  11/18/17 07:43


20 MG


 


 Albuterol/


 Ipratropium


  (Duoneb)  0.5 ml  Q6RWA


 INH  11/18/17 09:00


 12/18/17 08:59  11/18/17 15:25


0.5 ML


 


 Loratadine


  (Claritin Tab)  10 mg  QAM


 PO  11/18/17 09:00


 12/18/17 08:59  11/18/17 07:44


10 MG


 


 Mirabegron


  (Myrbetriq Er)  25 mg  DAILY


 PO  11/18/17 09:00


 12/18/17 08:59  11/18/17 07:43


25 MG


 


 Nystatin


  (Mycostatin Susp)  5 ml  QID  PRN


 PO  11/18/17 02:45


 11/28/17 02:44   


 


 


 Pravastatin Sodium


  (Pravachol Tab)  10 mg  QPM


 PO  11/18/17 21:00


 12/18/17 20:59   


 


 


 Ropinirole HCl


  (Requip Tab)  2 mg  TID


 PO  11/18/17 09:00


 12/18/17 08:59  11/18/17 13:38


2 MG


 


 Miscellaneous


 Information


  (Consult


 Glycemic


 Management


 Pharmacy)  1 ea  UD  PRN


 N/A  11/18/17 02:45


 12/18/17 02:44   


 


 


 Amiodarone HCl


  (Cordarone Tab)  200 mg  DAILY


 PO  11/18/17 09:00


 12/18/17 08:59  11/18/17 07:43


200 MG


 


 Metoprolol


 Succinate


  (Toprol Xl Tab)  50 mg  PM


 PO  11/18/17 21:00


 12/18/17 20:59   


 


 


 Trazodone HCl


  (Desyrel Tab)  25 mg  HS


 PO  11/18/17 21:00


 12/18/17 20:59   


 


 


 Insulin Aspart


  (novoLOG ASPART)  SLIDING


 SCALE  ACHS


 SC  11/18/17 12:30


 12/18/17 12:29 Future hold 11/18/17 17:54


5 UNITS


 


 Prednisone


  (PredniSONE TAB)  20 mg  DAILY@0800


 PO  11/18/17 13:30


 12/18/17 13:29  11/18/17 13:39


20 MG


 


 Insulin Human NPH


  (novoLIN-N NPH)  20 units  QDB


 SC  11/19/17 07:30


 12/19/17 07:29   


 


 


 Glucose


  (Glucose 40% Gel)  15-30


 GRAMS 15


 GRAMS...  UD  PRN


 PO  11/18/17 16:30


 12/18/17 16:29   


 


 


 Glucose


  (Glucose Chew


 Tab)  4-8


 Tablets 4


 Tabl...  UD  PRN


 PO  11/18/17 16:30


 12/18/17 16:29  11/18/17 16:38


4 TABS


 


 Dextrose


  (Dextrose 50%


 50ML Syringe)  25-50ML OF


 50% DW IV


 FOR...  UD  PRN


 IV  11/18/17 16:30


 12/18/17 16:29   


 


 


 Glucagon


  (Glucagon Inj)  1 mg  UD  PRN


 SQ  11/18/17 16:30


 12/18/17 16:29   


 











Objective


Vital Signs











  Date Time  Temp Pulse Resp B/P (MAP) Pulse Ox O2 Delivery O2 Flow Rate FiO2


 


11/18/17 16:00     96 Nasal Cannula 3.0 


 


11/18/17 15:27  62 16  96 Nasal Cannula 3.0 


 


11/18/17 15:25 36.6 62 20 108/66 (80) 97 Nasal Cannula 2.0 


 


11/18/17 12:00     97 Nasal Cannula 2.0 


 


11/18/17 12:00 36.3 61 18 115/60 (78) 97 Nasal Cannula 2.0 


 


11/18/17 10:00  62 18 110/63 (79) 97 Nasal Cannula 2.0 


 


11/18/17 08:00     97 Nasal Cannula 2.0 


 


11/18/17 08:00 36.3 62 18 91/78 (82) 97 Nasal Cannula 2.0 


 


11/18/17 07:35  57 16  95 Nasal Cannula 2.0 


 


11/18/17 06:00  51 35 105/52 (69)    


 


11/18/17 05:10     99 Nasal Cannula 4.0 


 


11/18/17 05:01  54 17 93/42 (59) 97   


 


11/18/17 05:00  55 23  97   


 


11/18/17 04:31  55 20 91/40 (57) 96   


 


11/18/17 04:17  55 24 102/43 (62) 97   


 


11/18/17 04:13  55 25 84/40 (55) 95   


 


11/18/17 04:08  54 12 83/38 (53) 96   


 


11/18/17 04:06  54 22 72/39 (50) 95   


 


11/18/17 04:01 36.7 49 22 67/39 (48) 95 Nasal Cannula 4.0 


 


11/18/17 04:00  54 23     


 


11/18/17 03:00  60 15  97   


 


11/18/17 02:15 36.5 69 22 125/69 99 Nasal Cannula 4.0 











Physical Exam


General Appearance:  WD/WN, no apparent distress


Neck:  supple, no adenopathy


Respiratory/Chest:  chest non-tender, lungs clear, normal breath sounds, no 

respiratory distress, no accessory muscle use


Cardiovascular:  regular rate, rhythm, no edema, no gallop, no JVD, no murmur


Extremities:  + pedal edema


Neurologic/Psychiatric:  CNs II-XII nml as tested, alert, normal mood/affect, 

oriented x 3, + pertinent finding (resting tremor )


Skin:  normal color, warm/dry, no rash





Laboratory Results


11/18/17 02:49








Red Blood Count 3.82, Mean Corpuscular Volume 96.3, Mean Corpuscular Hemoglobin 

30.6, Mean Corpuscular Hemoglobin Concent 31.8, Mean Platelet Volume 11.6, 

Neutrophils (%) (Auto) 95.1, Lymphocytes (%) (Auto) 2.9, Monocytes (%) (Auto) 

0.9, Eosinophils (%) (Auto) 0.6, Basophils (%) (Auto) 0.0, Neutrophils # (Auto) 

8.35, Lymphocytes # (Auto) 0.25, Monocytes # (Auto) 0.08, Eosinophils # (Auto) 

0.05, Basophils # (Auto) 0.00





11/18/17 05:21

















Test


  11/18/17


02:49 11/18/17


03:59 11/18/17


05:21 11/18/17


17:51


 


White Blood Count


  8.77 K/uL


(4.8-10.8) 


  


  


 


 


Red Blood Count


  3.82 M/uL


(4.2-5.4) 


  


  


 


 


Hemoglobin


  11.7 g/dL


(12.0-16.0) 


  


  


 


 


Hematocrit 36.8 % (37-47)    


 


Mean Corpuscular Volume


  96.3 fL


() 


  


  


 


 


Mean Corpuscular Hemoglobin


  30.6 pg


(25-34) 


  


  


 


 


Mean Corpuscular Hemoglobin


Concent 31.8 g/dl


(32-36) 


  


  


 


 


Platelet Count


  106 K/uL


(130-400) 


  


  


 


 


Mean Platelet Volume


  11.6 fL


(7.4-10.4) 


  


  


 


 


Neutrophils (%) (Auto) 95.1 %    


 


Lymphocytes (%) (Auto) 2.9 %    


 


Monocytes (%) (Auto) 0.9 %    


 


Eosinophils (%) (Auto) 0.6 %    


 


Basophils (%) (Auto) 0.0 %    


 


Neutrophils # (Auto)


  8.35 K/uL


(1.4-6.5) 


  


  


 


 


Lymphocytes # (Auto)


  0.25 K/uL


(1.2-3.4) 


  


  


 


 


Monocytes # (Auto)


  0.08 K/uL


(0.11-0.59) 


  


  


 


 


Eosinophils # (Auto)


  0.05 K/uL


(0-0.5) 


  


  


 


 


Basophils # (Auto)


  0.00 K/uL


(0-0.2) 


  


  


 


 


RDW Standard Deviation


  50.4 fL


(36.4-46.3) 


  


  


 


 


RDW Coefficient of Variation


  14.2 %


(11.5-14.5) 


  


  


 


 


Immature Granulocyte % (Auto) 0.5 %    


 


Immature Granulocyte # (Auto)


  0.04 K/uL


(0.00-0.02) 


  


  


 


 


Prothrombin Time


  10.2 SECONDS


(9.0-12.0) 


  


  


 


 


Prothromb Time International


Ratio 1.0 (0.9-1.1) 


  


  


  


 


 


Magnesium Level


  2.4 mg/dl


(1.8-2.4) 


  


  


 


 


Total Bilirubin


  0.6 mg/dl


(0.2-1) 


  


  


 


 


Direct Bilirubin


  0.3 mg/dl


(0-0.2) 


  


  


 


 


Aspartate Amino Transf


(AST/SGOT) 8 U/L (15-37) 


  


  


  


 


 


Alanine Aminotransferase


(ALT/SGPT) 24 U/L (12-78) 


  


  


  


 


 


Alkaline Phosphatase


  68 U/L


() 


  


  


 


 


Total Creatine Kinase


  19 U/L


() 


  


  


 


 


Troponin I


  < 0.015 ng/ml


(0-0.045) 


  


  


 


 


Total Protein


  6.3 gm/dl


(6.4-8.2) 


  


  


 


 


Albumin


  3.3 gm/dl


(3.4-5.0) 


  


  


 


 


Globulin


  3.0 gm/dl


(2.5-4.0) 


  


  


 


 


Albumin/Globulin Ratio 1.1 (0.9-2)    


 


Osmolality


  


  336 mOsm/kg


(280-300) 


  


 


 


Anion Gap


  


  


  11.0 mmol/L


(3-11) 


 


 


Est Creatinine Clear Calc


Drug Dose 


  


  13.8 ml/min 


  


 


 


Estimated GFR (


American) 


  


  13.2 


  


 


 


Estimated GFR (Non-


American 


  


  11.4 


  


 


 


BUN/Creatinine Ratio   37.4 (10-20)  


 


Calcium Level


  


  


  7.9 mg/dl


(8.5-10.1) 


 


 


Bedside Glucose


  


  


  


  180 mg/dl


(70-90)














 Date/Time


Source Procedure


Growth Status


 


 


 11/18/17 02:30


Nasal MRSA DNA Surveillance Screen - Final


Specimen Negative for MRSA by DNA Probe Complete











Assessment and Plan


71 yo female, with end-stage COPD and CHF who presents with BG >600 transferred 

from [facility].


Patient transferred and initially treated for suspected DKA





Acute on chronic respiratory failure with hypercapnia


- Continue current o2 regime with BiPAP at night


- Continue Prednisone 20mg dose tomorrow. Reducing dose by 5mg q 3 days. 


- Continue daily Lasix 80mg


- Continue inhalers


- ABG at [facility]-->7.29 ph, 55 co2, 24 hco3. Much improved from previous 

admission where Co2 was >100. 


- check for need of bipap on discharge. 





Chronic COPD


- continue inhalers and prednisone. will need very slow taper.


- consider pul consult as outpatient





Hypergycemia, Labile blood glucose on  prednisone 


- Non-gap acidosis, pt is not in DKA 


- Insulin drip cancelled


- Pt transferred to telemetry 


- Glipizide on hold - will d/c on discharge and should go home on insulin. 





Hyperkalemia 


-Improved with insulin and hydration 


-follow BMP 





Hyponatremia 


-Improved 125--->133


-Follow BMP





Chronic diastolic CHF


- I&O monitoring, gil already in place


- daily weights





CAD


- Continue statin, beta-blocker (Toprol XL 50mg daily). ACE-I stopped last 

admission due to JANES.





Atrial fibrillation


- Continue amiodarone 200mg daily 


- Pt refused long term anticoagulation last admission





Restless leg syndrome


- Continue ropinirole





Gait disturbance with leg weakness and tremor


- ? sec to steroid induced muscle weakness/deconditioning


-Pt describes a worsening tremor that began prior to last visit for resp 

failure. 


-Prior to last visit patient had fallen and hit her head


-Head CT was unremarkable during last visit 


- PT/OT when able to





Depression


- Continue Prozac





Urinary incontinence


- Continue Mirabegron





VTE: Heparin SQ


Code status: DNR


Dispo: Admitted to ICU





Reviewed:  Pt Seen/Exam by Me


History


breathing back to baseline


Constitutional:  denies: fever


Respiratory:  negative: short of breath


Cardiovascular:  denies chest pain


Gastrointestinal/Abdominal:  negative: abdominal pain


General Appearance:  no apparent distress


Respiratory:  no respiratory distress, decreased breath sounds


Cardiovascular:  regular rate, rhythm


Gastrointestinal:  soft


Neurologic/Psychiatric:  alert, oriented x 3


Skin Characteristics:  warm/dry


Assessment/Plan


Resident Physician Supervision Note:


I independently interviewed and examined the patient and verified the key 

history and physical, reviewed labs and image studies, discussed the case with 

the resident Dr. Velasco and agree with the findings and care plan.

## 2017-11-19 VITALS
DIASTOLIC BLOOD PRESSURE: 66 MMHG | TEMPERATURE: 98.24 F | HEART RATE: 67 BPM | SYSTOLIC BLOOD PRESSURE: 109 MMHG | OXYGEN SATURATION: 95 %

## 2017-11-19 VITALS
SYSTOLIC BLOOD PRESSURE: 116 MMHG | OXYGEN SATURATION: 96 % | DIASTOLIC BLOOD PRESSURE: 73 MMHG | HEART RATE: 63 BPM | TEMPERATURE: 97.16 F

## 2017-11-19 VITALS
TEMPERATURE: 98.06 F | OXYGEN SATURATION: 90 % | DIASTOLIC BLOOD PRESSURE: 62 MMHG | SYSTOLIC BLOOD PRESSURE: 110 MMHG | HEART RATE: 64 BPM

## 2017-11-19 VITALS
OXYGEN SATURATION: 95 % | HEART RATE: 64 BPM | DIASTOLIC BLOOD PRESSURE: 73 MMHG | TEMPERATURE: 97.34 F | SYSTOLIC BLOOD PRESSURE: 131 MMHG

## 2017-11-19 VITALS — HEART RATE: 67 BPM | OXYGEN SATURATION: 93 %

## 2017-11-19 VITALS
HEART RATE: 56 BPM | TEMPERATURE: 96.8 F | OXYGEN SATURATION: 97 % | SYSTOLIC BLOOD PRESSURE: 104 MMHG | DIASTOLIC BLOOD PRESSURE: 63 MMHG

## 2017-11-19 VITALS — HEART RATE: 70 BPM | OXYGEN SATURATION: 94 %

## 2017-11-19 VITALS
OXYGEN SATURATION: 97 % | SYSTOLIC BLOOD PRESSURE: 112 MMHG | TEMPERATURE: 97.34 F | HEART RATE: 59 BPM | DIASTOLIC BLOOD PRESSURE: 61 MMHG

## 2017-11-19 VITALS — HEART RATE: 70 BPM | OXYGEN SATURATION: 96 %

## 2017-11-19 VITALS
DIASTOLIC BLOOD PRESSURE: 80 MMHG | OXYGEN SATURATION: 96 % | TEMPERATURE: 97.34 F | SYSTOLIC BLOOD PRESSURE: 135 MMHG | HEART RATE: 60 BPM

## 2017-11-19 VITALS — OXYGEN SATURATION: 94 % | HEART RATE: 62 BPM

## 2017-11-19 LAB
BASOPHILS # BLD: 0 K/UL (ref 0–0.2)
BASOPHILS NFR BLD: 0 %
COMPLETE: YES
EOSINOPHIL NFR BLD AUTO: 119 K/UL (ref 130–400)
HCT VFR BLD CALC: 34.9 % (ref 37–47)
IG%: 0.5 %
IMM GRANULOCYTES NFR BLD AUTO: 6.6 %
INR PPP: 1 (ref 0.9–1.1)
LYMPHOCYTES # BLD: 0.5 K/UL (ref 1.2–3.4)
MAGNESIUM SERPL-MCNC: 2.3 MG/DL (ref 1.8–2.4)
MCH RBC QN AUTO: 31.3 PG (ref 25–34)
MCHC RBC AUTO-ENTMCNC: 32.7 G/DL (ref 32–36)
MCV RBC AUTO: 95.9 FL (ref 80–100)
MONOCYTES NFR BLD: 3.8 %
NEUTROPHILS # BLD AUTO: 0.4 %
NEUTROPHILS NFR BLD AUTO: 88.7 %
PARTIAL THROMBOPLASTIN RATIO: 0.9
PHOSPHATE SERPL-MCNC: 7.1 MG/DL (ref 2.5–4.9)
PMV BLD AUTO: 10.6 FL (ref 7.4–10.4)
PROTHROMBIN TIME: 10.7 SECONDS (ref 9–12)
RBC # BLD AUTO: 3.64 M/UL (ref 4.2–5.4)
WBC # BLD AUTO: 7.58 K/UL (ref 4.8–10.8)

## 2017-11-19 RX ADMIN — METOPROLOL SUCCINATE SCH MG: 50 TABLET, EXTENDED RELEASE ORAL at 20:22

## 2017-11-19 RX ADMIN — HEPARIN SODIUM SCH UNIT: 10000 INJECTION, SOLUTION INTRAVENOUS; SUBCUTANEOUS at 20:21

## 2017-11-19 RX ADMIN — INSULIN ASPART SCH UNITS: 100 INJECTION, SOLUTION INTRAVENOUS; SUBCUTANEOUS at 17:55

## 2017-11-19 RX ADMIN — AMIODARONE HYDROCHLORIDE SCH MG: 200 TABLET ORAL at 08:23

## 2017-11-19 RX ADMIN — FLUOXETINE SCH MG: 20 CAPSULE ORAL at 08:24

## 2017-11-19 RX ADMIN — IPRATROPIUM BROMIDE AND ALBUTEROL SULFATE SCH ML: .5; 3 SOLUTION RESPIRATORY (INHALATION) at 19:20

## 2017-11-19 RX ADMIN — BUDESONIDE AND FORMOTEROL FUMARATE DIHYDRATE SCH PUFFS: 160; 4.5 AEROSOL RESPIRATORY (INHALATION) at 08:24

## 2017-11-19 RX ADMIN — IPRATROPIUM BROMIDE AND ALBUTEROL SULFATE SCH ML: .5; 3 SOLUTION RESPIRATORY (INHALATION) at 14:24

## 2017-11-19 RX ADMIN — BECLOMETHASONE DIPROPIONATE HFA SCH PUFFS: 80 AEROSOL, METERED RESPIRATORY (INHALATION) at 08:24

## 2017-11-19 RX ADMIN — HEPARIN SODIUM SCH UNIT: 10000 INJECTION, SOLUTION INTRAVENOUS; SUBCUTANEOUS at 08:30

## 2017-11-19 RX ADMIN — TRAZODONE HYDROCHLORIDE SCH MG: 50 TABLET ORAL at 20:21

## 2017-11-19 RX ADMIN — MIRABEGRON SCH MG: 25 TABLET, FILM COATED, EXTENDED RELEASE ORAL at 08:22

## 2017-11-19 RX ADMIN — ROPINIROLE HYDROCHLORIDE SCH MG: 1 TABLET, FILM COATED ORAL at 08:22

## 2017-11-19 RX ADMIN — ROPINIROLE HYDROCHLORIDE SCH MG: 1 TABLET, FILM COATED ORAL at 20:22

## 2017-11-19 RX ADMIN — INSULIN ASPART SCH UNITS: 100 INJECTION, SOLUTION INTRAVENOUS; SUBCUTANEOUS at 20:20

## 2017-11-19 RX ADMIN — BECLOMETHASONE DIPROPIONATE HFA SCH PUFFS: 80 AEROSOL, METERED RESPIRATORY (INHALATION) at 20:20

## 2017-11-19 RX ADMIN — NYSTATIN PRN ML: 100000 SUSPENSION ORAL at 08:30

## 2017-11-19 RX ADMIN — IPRATROPIUM BROMIDE AND ALBUTEROL SULFATE SCH ML: .5; 3 SOLUTION RESPIRATORY (INHALATION) at 07:14

## 2017-11-19 RX ADMIN — ROPINIROLE HYDROCHLORIDE SCH MG: 1 TABLET, FILM COATED ORAL at 14:27

## 2017-11-19 RX ADMIN — Medication SCH MG: at 08:22

## 2017-11-19 RX ADMIN — INSULIN ASPART SCH UNITS: 100 INJECTION, SOLUTION INTRAVENOUS; SUBCUTANEOUS at 12:31

## 2017-11-19 RX ADMIN — PRAVASTATIN SODIUM SCH MG: 10 TABLET ORAL at 20:21

## 2017-11-19 RX ADMIN — INSULIN ASPART SCH UNITS: 100 INJECTION, SOLUTION INTRAVENOUS; SUBCUTANEOUS at 08:29

## 2017-11-19 RX ADMIN — BUDESONIDE AND FORMOTEROL FUMARATE DIHYDRATE SCH PUFFS: 160; 4.5 AEROSOL RESPIRATORY (INHALATION) at 20:20

## 2017-11-19 NOTE — PROGRESS NOTE
Post ICU Progress Note


Date & Time


Nov 19, 2017 at 20:25





Vital Signs





Vital Signs Past 12 Hours








  Date Time  Temp Pulse Resp B/P (MAP) Pulse Ox O2 Delivery O2 Flow Rate FiO2


 


11/19/17 20:11      Nasal Cannula 2.0 


 


11/19/17 19:37 36.8 67 20 109/66 (80) 95 Nasal Cannula 2.0 


 


11/19/17 19:20  70 16  94 BiPAP/CPAP 2.0 


 


11/19/17 16:00      Nasal Cannula 2.0 


 


11/19/17 15:37 36.7 64 22 110/62 (78) 90 BiPAP  


 


11/19/17 14:24  70 16  96 BiPAP/CPAP 2.0 


 


11/19/17 12:46  67   93  3.0 


 


11/19/17 12:00      Nasal Cannula 2.0 





      BiPAP  


 


11/19/17 11:32 36.2 63 20 116/73 (87) 96  3.0 











Notes


Mental Status:  alert / awake


Nausea / Vomiting:  adequately controlled


Pain:  adequately controlled


Airway Patency, RR, SpO2:  stable & adequate


BP & HR:  stable & adequate


Patient is a 72-year-old female who initially presented to the ICU from [facility] with hyperglycemia.  She was vigorously hydrated and 

received insulin with response of blood glucose.  She was found have an JANES 

which is likely the main contributor to the patient's symptoms.  At this point, 

she should continue chronically with her steroids secondary to the severe 

pathology her lung disease. He Lasix has been discontinued 2/2 worsening JANES 

contributing to acidosis. Otherwise, the patient has shown great improvement 

throughout her stay.





On evaluation today, the patient reports feeling better.  She was able to stand 

today.  She does report a residual tremor, but is encouraged that she will see 

neurology tomorrow.  Otherwise, the patient reports feeling fine.





Consider outpatient follow up in 1 to 2 weeks with:  Per admitting team.





Repeat imaging needed:  N/A





Follow up cultures:  None





Reviewed progress notes, labs, and inpatient medication list





Continue current management





Additional recommendations:  None at this time.





Thank you for allowing us to participate in the care of this patient. At this 

time, Critical Care Services will sign off on this patient. Please feel free to 

reconsult as needed.

## 2017-11-19 NOTE — PHARMACY PROGRESS NOTE
Pharmacy Glycemic Short Note 2


Date of Service


Nov 19, 2017.





OUTPATIENT ANTIDIABETIC REGIMEN: 


* Glipizide 5mg PO BIDM











ASSESSMENT:


* 73yo T2DM female well known to pharmacy per previous admissions/glycemic 

consults. Most recently in May 2017 and earlier this month. See progress note 

from 11/18/17 for more background info. 


Yesterday, 11/18/17: Pt had poor glycemic control with wide up/down 

fluctuations in BSGs.


* Pt transferred from [facility] with severe hyperglycemia (was given insulin at 

[facility]) and then continued on IV insulin infusion at [facility]


 * BSGs dropped to low 100's and therefore IV insulin infusion was stopped. 


* Pt was given a dose of hydrocortisone and PO prednisone in the morning which 

caused rebound hyperglycemia. 


 * NPH 34 units (0.4 units/kg) was given for steroid induced hyperglycemia 

secondary to once daily prednisone. NPH is the insulin of choice for once daily 

prednisone as its kinetics coincide with the hyperglycemic kinetics of 

prednisone. Pt with "low" A1c of ~ 7% and may not require basal insulin for HS -

-> AM BSG drop (pt has adequate overnight basal insulin secretion). Therefore, 

using NPH which is a 12-18hr insulin (not 24hrs like Lantus) is preferred to 

prevent AM hypo in this patient


* BSG was checked 2hrs after NPH given and unfortunately BSG was increasing 

instead of decreasing (; NPH given; then, two hours later ). IV 

insulin infusion was restarted per ICU protocol for BSG >220 mg/dl x 1


 * After only a few hours on the IV insulin infusion, BSG dropped, non-critical 

hypoglycemia occurred. Drip stopped. 


Today, 11/19/17:


* AM fasting BSG is in goal range for inpatient targets at 125mg/dl


* Prednisone dose stable at 20mg PO daily, but no hydrocortisone ordered. 


 * Will decrease NPH dose accordingly. Recommended dosing of NPH for 20mg of 

prednisone is 0.2units/kg which equates to 20 units of NPH


* Nursing uncomfortable with giving 20 units this AM, therefore, we will break 

up the dose. 


 * Give 10 units NPH with breakfast and if BSG >140 mg/dl at lunch will give 

the remaining 10 units NPH for a total dose of 20 units this AM 











PLAN FOR INPATIENT GLYCEMIC CONTROL:


* Basal insulin: decrease dose in accordance with decreased steroid dosing


 * NPH 20 units (~0.2units/kg) SQ daily in the morning with prednisone 

administration 


 * Will give 10 units with breakfast and the remaining 10 units with lunch 

granted BSG >140mg/dl at lunch 





* Bolus insulin: continue weight based/moderate stress dosing


 * NovoLog per scale ACHS or Q6hrs while NPO


 * Goal Range:  Low 120 mg/dL - High 150 mg/dL


 * Correction Factor:  20 mg/dL/unit


 * Nutritional / Prandial insulin per carb ratio of  1 unit per 8 grams CHO 

consumed











PLAN FOR DISCHARGE:


* A1c is in goal range at 7.1% on 11/19/17


 * Reasonable to continue current outpatient regimen at discharge provided it 

is not causing hypoglycemia as an outpatient

## 2017-11-19 NOTE — DIAGNOSTIC IMAGING REPORT
CHEST ONE VIEW PORTABLE



CLINICAL HISTORY: SHORTNESS OF BREATH    



COMPARISON STUDY:  11/18/2017



FINDINGS: The heart is mildly enlarged. There are linear left basilar densities,

likely representing subsegmental atelectatic change. There is no overt failure.

There is stable blunting of the left lateral costophrenic angle. There is no

lobar consolidation.



IMPRESSION: Stable linear opacities the left lung base likely representing

subsegmental atelectatic change.







Electronically signed by:  Bryce Fermin M.D.

11/19/2017 7:24 AM



Dictated Date/Time:  11/19/2017 7:23 AM

## 2017-11-19 NOTE — FAMILY MEDICINE PROGRESS NOTE
Progress Note


Date of Service


Nov 19, 2017.





Subjective


Pt evaluation today including:  conversation w/ patient, conversation w/ family

, physical exam, chart review, lab review, review of studies


Patient does not have any acute complaints over night. She slept well, no issues

, although she was not set up with her BIPAP last night.





   Constitutional:  No fever, No chills, No sweats


   Respiratory:  + shortness of breath, + dyspnea on exertion, No cough, No 

sputum, No wheezing


   Cardiovascular:  No chest pain, No edema, No palpitations


   Abdomen:  No pain, No nausea, No vomiting





Medications





Current Inpatient Medications








 Medications


  (Trade)  Dose


 Ordered  Sig/Roya


 Route  Start Time


 Stop Time Status Last Admin


Dose Admin


 


 Heparin Sodium


  (Porcine)


  (Heparin Sq 5000


 Unit/0.5ml)  5,000 unit  Q12H


 SQ  11/18/17 09:00


 12/18/17 08:59  11/19/17 08:30


5,000 UNIT


 


 Acetaminophen


  (Tylenol Tab)  650 mg  Q4H  PRN


 PO  11/18/17 02:30


 12/18/17 02:29   


 


 


 Morphine Sulfate


  (MoRPHine


 SULFATE INJ)  2 mg  Q2H  PRN


 IV  11/18/17 02:30


 12/2/17 02:29   


 


 


 Beclomethasone


 Dipropionate


  (Qvar 80 Mcg Hfa


 Inhaler)  2 puffs  BID


 INH  11/18/17 09:00


 12/18/17 08:59  11/19/17 08:24


2 PUFFS


 


 Budesonide/


 Formoterol


 Fumarate


  (Symbicort 160/


 4.5 Inh)  2 puffs  BID


 INH  11/18/17 09:00


 12/18/17 08:59  11/19/17 08:24


2 PUFFS


 


 Fluoxetine HCl


  (Prozac Cap)  20 mg  QAM


 PO  11/18/17 09:00


 12/18/17 08:59  11/19/17 08:24


20 MG


 


 Albuterol/


 Ipratropium


  (Duoneb)  0.5 ml  Q6RWA


 INH  11/18/17 09:00


 12/18/17 08:59  11/19/17 07:14


3 ML


 


 Loratadine


  (Claritin Tab)  10 mg  QAM


 PO  11/18/17 09:00


 12/18/17 08:59  11/19/17 08:22


10 MG


 


 Mirabegron


  (Myrbetriq Er)  25 mg  DAILY


 PO  11/18/17 09:00


 12/18/17 08:59  11/19/17 08:22


25 MG


 


 Nystatin


  (Mycostatin Susp)  5 ml  QID  PRN


 PO  11/18/17 02:45


 11/28/17 02:44  11/19/17 08:30


5 ML


 


 Pravastatin Sodium


  (Pravachol Tab)  10 mg  QPM


 PO  11/18/17 21:00


 12/18/17 20:59  11/18/17 19:45


10 MG


 


 Ropinirole HCl


  (Requip Tab)  2 mg  TID


 PO  11/18/17 09:00


 12/18/17 08:59  11/19/17 08:22


2 MG


 


 Miscellaneous


 Information


  (Consult


 Glycemic


 Management


 Pharmacy)  1 ea  UD  PRN


 N/A  11/18/17 02:45


 12/18/17 02:44   


 


 


 Amiodarone HCl


  (Cordarone Tab)  200 mg  DAILY


 PO  11/18/17 09:00


 12/18/17 08:59  11/19/17 08:23


200 MG


 


 Metoprolol


 Succinate


  (Toprol Xl Tab)  50 mg  PM


 PO  11/18/17 21:00


 12/18/17 20:59   


 


 


 Trazodone HCl


  (Desyrel Tab)  25 mg  HS


 PO  11/18/17 21:00


 12/18/17 20:59  11/18/17 22:26


25 MG


 


 Insulin Aspart


  (novoLOG ASPART)  SLIDING


 SCALE  ACHS


 SC  11/18/17 12:30


 12/18/17 12:29 Future hold 11/19/17 08:29


7 UNITS


 


 Prednisone


  (PredniSONE TAB)  20 mg  DAILY@0800


 PO  11/18/17 13:30


 12/18/17 13:29  11/19/17 08:22


20 MG


 


 Glucose


  (Glucose 40% Gel)  15-30


 GRAMS 15


 GRAMS...  UD  PRN


 PO  11/18/17 16:30


 12/18/17 16:29   


 


 


 Glucose


  (Glucose Chew


 Tab)  4-8


 Tablets 4


 Tabl...  UD  PRN


 PO  11/18/17 16:30


 12/18/17 16:29  11/18/17 16:38


4 TABS


 


 Dextrose


  (Dextrose 50%


 50ML Syringe)  25-50ML OF


 50% DW IV


 FOR...  UD  PRN


 IV  11/18/17 16:30


 12/18/17 16:29   


 


 


 Glucagon


  (Glucagon Inj)  1 mg  UD  PRN


 SQ  11/18/17 16:30


 12/18/17 16:29   


 


 


 Sodium Chloride


  (Ocean Nasal


 Spray)  1 sprays  PRN  PRN


 NA  11/18/17 21:00


 12/18/17 20:59   


 


 


 Insulin Human NPH


  (novoLIN-N NPH)  10 units  TODAY@1145


 SC  11/19/17 11:45


 11/19/17 12:30   


 


 


 Insulin Human NPH


  (novoLIN-N NPH)  20 units  QDB


 SC  11/20/17 07:30


 12/20/17 07:29   


 











Objective


Vital Signs











  Date Time  Temp Pulse Resp B/P (MAP) Pulse Ox O2 Delivery O2 Flow Rate FiO2


 


11/19/17 08:15 36.3 60 18 135/80 (98) 96  3.0 


 


11/19/17 08:00      Nasal Cannula 2.0 


 


11/19/17 07:14  62 16  94 Nasal Cannula 2.0 


 


11/19/17 04:02 36.0 56 18 104/63 (77) 97 Nasal Cannula 3.0 


 


11/19/17 04:00      Nasal Cannula 2.0 


 


11/19/17 00:11 36.3 59 22 112/61 (78) 97 Nasal Cannula 3.0 


 


11/18/17 23:59      Nasal Cannula 2.0 


 


11/18/17 20:15  73 16  93 Nasal Cannula 2.0 


 


11/18/17 20:00      Nasal Cannula 2.0 


 


11/18/17 19:32 36.5 59 22 104/62 (76) 96 Nasal Cannula 2.0 


 


11/18/17 16:00     96 Nasal Cannula 3.0 


 


11/18/17 15:27  62 16  96 Nasal Cannula 3.0 


 


11/18/17 15:25 36.6 62 20 108/66 (80) 97 Nasal Cannula 2.0 


 


11/18/17 12:00     97 Nasal Cannula 2.0 


 


11/18/17 12:00 36.3 61 18 115/60 (78) 97 Nasal Cannula 2.0 











Physical Exam


General Appearance:  WD/WN, no apparent distress


Neck:  supple, no adenopathy


Respiratory/Chest:  chest non-tender, lungs clear, normal breath sounds, no 

respiratory distress, no accessory muscle use


Cardiovascular:  regular rate, rhythm, no edema, no gallop, no JVD, no murmur


Abdomen:  normal bowel sounds, non tender, soft, no organomegaly


Neurologic/Psychiatric:  alert, normal mood/affect, oriented x 3


Skin:  normal color, warm/dry, no rash





Laboratory Results


11/19/17 07:06








Red Blood Count 3.64, Mean Corpuscular Volume 95.9, Mean Corpuscular Hemoglobin 

31.3, Mean Corpuscular Hemoglobin Concent 32.7, Mean Platelet Volume 10.6, 

Neutrophils (%) (Auto) 88.7, Lymphocytes (%) (Auto) 6.6, Monocytes (%) (Auto) 

3.8, Eosinophils (%) (Auto) 0.4, Basophils (%) (Auto) 0.0, Neutrophils # (Auto) 

6.72, Lymphocytes # (Auto) 0.50, Monocytes # (Auto) 0.29, Eosinophils # (Auto) 

0.03, Basophils # (Auto) 0.00





11/18/17 18:57








11/18/17 20:25

















Test


  11/18/17


18:57 11/19/17


06:03 11/19/17


07:06


 


Anion Gap


  16.0 mmol/L


(3-11) 


  


 


 


Est Creatinine Clear Calc


Drug Dose 14.8 ml/min 


  


  


 


 


Estimated GFR (


American) 14.3 


  


  


 


 


Estimated GFR (Non-


American 12.4 


  


  


 


 


BUN/Creatinine Ratio 36.8 (10-20)   


 


Calcium Level


  8.6 mg/dl


(8.5-10.1) 


  


 


 


Bedside Glucose


  


  125 mg/dl


(70-90) 


 


 


White Blood Count


  


  


  7.58 K/uL


(4.8-10.8)


 


Red Blood Count


  


  


  3.64 M/uL


(4.2-5.4)


 


Hemoglobin


  


  


  11.4 g/dL


(12.0-16.0)


 


Hematocrit   34.9 % (37-47) 


 


Mean Corpuscular Volume


  


  


  95.9 fL


()


 


Mean Corpuscular Hemoglobin


  


  


  31.3 pg


(25-34)


 


Mean Corpuscular Hemoglobin


Concent 


  


  32.7 g/dl


(32-36)


 


Platelet Count


  


  


  119 K/uL


(130-400)


 


Mean Platelet Volume


  


  


  10.6 fL


(7.4-10.4)


 


Neutrophils (%) (Auto)   88.7 % 


 


Lymphocytes (%) (Auto)   6.6 % 


 


Monocytes (%) (Auto)   3.8 % 


 


Eosinophils (%) (Auto)   0.4 % 


 


Basophils (%) (Auto)   0.0 % 


 


Neutrophils # (Auto)


  


  


  6.72 K/uL


(1.4-6.5)


 


Lymphocytes # (Auto)


  


  


  0.50 K/uL


(1.2-3.4)


 


Monocytes # (Auto)


  


  


  0.29 K/uL


(0.11-0.59)


 


Eosinophils # (Auto)


  


  


  0.03 K/uL


(0-0.5)


 


Basophils # (Auto)


  


  


  0.00 K/uL


(0-0.2)


 


RDW Standard Deviation


  


  


  50.4 fL


(36.4-46.3)


 


RDW Coefficient of Variation


  


  


  14.4 %


(11.5-14.5)


 


Immature Granulocyte % (Auto)   0.5 % 


 


Immature Granulocyte # (Auto)


  


  


  0.04 K/uL


(0.00-0.02)


 


Prothrombin Time


  


  


  10.7 SECONDS


(9.0-12.0)


 


Prothromb Time International


Ratio 


  


  1.0 (0.9-1.1) 


 


 


Activated Partial


Thromboplast Time 


  


  22.1 SECONDS


(21.0-31.0)


 


Partial Thromboplastin Ratio   0.9 


 


Phosphorus Level


  


  


  7.1 mg/dl


(2.5-4.9)


 


Magnesium Level


  


  


  2.3 mg/dl


(1.8-2.4)











Assessment and Plan


71 yo female, with end-stage COPD and CHF who presents with BG >600 transferred 

from [facility].


Patient transferred and initially treated for suspected DKA





Acute on chronic respiratory failure with hypercapnia


- Continue current o2 regime with BiPAP at night--was not started last night, 

called respiratory to setup


- Continue Prednisone 20mg dose tomorrow. Reducing dose by 5mg q 3 days (15 mg 

on Tuesday) 


- Continue daily Lasix 80mg


- Continue inhalers


- ABG at [facility]-->7.29 ph, 55 co2, 24 hco3. Much improved from previous 

admission where Co2 was >100. 


- check for need of bipap on discharge. 





Chronic COPD


- continue inhalers and prednisone. will need very slow taper.


- consider pul consult as outpatient





Hypergycemia, Labile blood glucose on prednisone 


- Non-gap acidosis, pt is not in DKA 


- Insulin drip cancelled


- Pt transferred to telemetry 


- Glipizide on hold - will d/c on discharge and should go home on insulin. 





Gait disturbance with leg weakness and tremor


- ? sec to steroid induced muscle weakness/deconditioning


-Pt describes a worsening tremor that began prior to last visit for respiratory 

failure. 


-Neurology consulted 


-Prior to last visit patient had fallen and hit her head


-Head CT was unremarkable during last visit 


- PT/OT consult





Hyperkalemia 


-Improved with insulin and hydration 


-follow BMP 





Hyponatremia 


-Improved 


-Follow BMP





Chronic diastolic CHF


- I&O monitoring, gil already in place


- daily weights





CAD


- Continue statin, beta-blocker (Toprol XL 50mg daily). ACE-I stopped last 

admission due to JANES.





Atrial fibrillation


- Continue amiodarone 200mg daily 


- Pt refused long term anticoagulation last admission





Restless leg syndrome


- Continue ropinirole





Depression


- Continue Prozac





Urinary incontinence


- Continue Mirabegron





VTE: Heparin SQ


Code status: DNR


Dispo: Admitted to ICU





Reviewed:  Pt Seen/Exam by Me


History


breathing well


blood sugars are better controlled


daughter at bedside


Constitutional:  denies: fever


Respiratory:  negative: short of breath


Cardiovascular:  denies chest pain


General Appearance:  no apparent distress


Respiratory:  no respiratory distress, decreased breath sounds


Cardiovascular:  regular rate, rhythm


Neurologic/Psychiatric:  alert, oriented x 3


Assessment/Plan


Resident Physician Supervision Note:


I independently interviewed and examined the patient and verified the key 

history and physical, reviewed labs and image studies, discussed the case with 

the resident Dr. Velasco and agree with the findings and care plan.

## 2017-11-20 VITALS
DIASTOLIC BLOOD PRESSURE: 67 MMHG | TEMPERATURE: 97.7 F | OXYGEN SATURATION: 97 % | HEART RATE: 67 BPM | SYSTOLIC BLOOD PRESSURE: 110 MMHG

## 2017-11-20 VITALS
HEART RATE: 63 BPM | DIASTOLIC BLOOD PRESSURE: 69 MMHG | TEMPERATURE: 98.06 F | OXYGEN SATURATION: 97 % | SYSTOLIC BLOOD PRESSURE: 108 MMHG

## 2017-11-20 VITALS
DIASTOLIC BLOOD PRESSURE: 73 MMHG | TEMPERATURE: 97.52 F | HEART RATE: 65 BPM | OXYGEN SATURATION: 94 % | SYSTOLIC BLOOD PRESSURE: 126 MMHG

## 2017-11-20 VITALS
HEART RATE: 60 BPM | SYSTOLIC BLOOD PRESSURE: 135 MMHG | TEMPERATURE: 97.52 F | OXYGEN SATURATION: 96 % | DIASTOLIC BLOOD PRESSURE: 78 MMHG

## 2017-11-20 VITALS
DIASTOLIC BLOOD PRESSURE: 85 MMHG | TEMPERATURE: 97.52 F | OXYGEN SATURATION: 92 % | HEART RATE: 70 BPM | SYSTOLIC BLOOD PRESSURE: 148 MMHG

## 2017-11-20 VITALS
TEMPERATURE: 98.6 F | OXYGEN SATURATION: 95 % | SYSTOLIC BLOOD PRESSURE: 161 MMHG | DIASTOLIC BLOOD PRESSURE: 81 MMHG | HEART RATE: 67 BPM

## 2017-11-20 VITALS — OXYGEN SATURATION: 98 % | HEART RATE: 62 BPM

## 2017-11-20 VITALS — OXYGEN SATURATION: 95 %

## 2017-11-20 VITALS — HEART RATE: 66 BPM | OXYGEN SATURATION: 94 %

## 2017-11-20 VITALS — OXYGEN SATURATION: 94 % | HEART RATE: 61 BPM

## 2017-11-20 LAB
ALBUMIN/GLOB SERPL: 1.1 {RATIO} (ref 0.9–2)
ALP SERPL-CCNC: 53 U/L (ref 45–117)
ALT SERPL-CCNC: 18 U/L (ref 12–78)
ANION GAP SERPL CALC-SCNC: 9 MMOL/L (ref 3–11)
AST SERPL-CCNC: 8 U/L (ref 15–37)
BASOPHILS # BLD: 0 K/UL (ref 0–0.2)
BASOPHILS NFR BLD: 0 %
BUN SERPL-MCNC: 104 MG/DL (ref 7–18)
BUN/CREAT SERPL: 36.3 (ref 10–20)
CALCIUM SERPL-MCNC: 8.2 MG/DL (ref 8.5–10.1)
CHLORIDE SERPL-SCNC: 99 MMOL/L (ref 98–107)
CO2 SERPL-SCNC: 30 MMOL/L (ref 21–32)
COMPLETE: YES
CREAT CL PREDICTED SERPL C-G-VRATE: 18 ML/MIN
CREAT SERPL-MCNC: 2.85 MG/DL (ref 0.6–1.2)
EOSINOPHIL NFR BLD AUTO: 108 K/UL (ref 130–400)
GLOBULIN SER-MCNC: 2.6 GM/DL (ref 2.5–4)
GLUCOSE SERPL-MCNC: 150 MG/DL (ref 70–99)
HCT VFR BLD CALC: 33.6 % (ref 37–47)
IG%: 0.5 %
IMM GRANULOCYTES NFR BLD AUTO: 10.5 %
INR PPP: 1 (ref 0.9–1.1)
LYMPHOCYTES # BLD: 0.67 K/UL (ref 1.2–3.4)
MAGNESIUM SERPL-MCNC: 2.3 MG/DL (ref 1.8–2.4)
MCH RBC QN AUTO: 29.8 PG (ref 25–34)
MCHC RBC AUTO-ENTMCNC: 31 G/DL (ref 32–36)
MCV RBC AUTO: 96.3 FL (ref 80–100)
MONOCYTES NFR BLD: 5.5 %
NEUTROPHILS # BLD AUTO: 0.5 %
NEUTROPHILS NFR BLD AUTO: 83 %
PARTIAL THROMBOPLASTIN RATIO: 0.9
PHOSPHATE SERPL-MCNC: 5.6 MG/DL (ref 2.5–4.9)
PMV BLD AUTO: 10.8 FL (ref 7.4–10.4)
POTASSIUM SERPL-SCNC: 4.5 MMOL/L (ref 3.5–5.1)
PROTHROMBIN TIME: 10.8 SECONDS (ref 9–12)
RBC # BLD AUTO: 3.49 M/UL (ref 4.2–5.4)
SODIUM SERPL-SCNC: 138 MMOL/L (ref 136–145)
WBC # BLD AUTO: 6.41 K/UL (ref 4.8–10.8)

## 2017-11-20 RX ADMIN — BUDESONIDE AND FORMOTEROL FUMARATE DIHYDRATE SCH PUFFS: 160; 4.5 AEROSOL RESPIRATORY (INHALATION) at 08:36

## 2017-11-20 RX ADMIN — AMIODARONE HYDROCHLORIDE SCH MG: 200 TABLET ORAL at 08:38

## 2017-11-20 RX ADMIN — INSULIN ASPART SCH UNITS: 100 INJECTION, SOLUTION INTRAVENOUS; SUBCUTANEOUS at 20:30

## 2017-11-20 RX ADMIN — NYSTATIN PRN ML: 100000 SUSPENSION ORAL at 23:47

## 2017-11-20 RX ADMIN — ROPINIROLE HYDROCHLORIDE SCH MG: 1 TABLET, FILM COATED ORAL at 20:27

## 2017-11-20 RX ADMIN — HEPARIN SODIUM SCH UNIT: 10000 INJECTION, SOLUTION INTRAVENOUS; SUBCUTANEOUS at 20:31

## 2017-11-20 RX ADMIN — METOPROLOL SUCCINATE SCH MG: 50 TABLET, EXTENDED RELEASE ORAL at 20:28

## 2017-11-20 RX ADMIN — IPRATROPIUM BROMIDE AND ALBUTEROL SULFATE SCH ML: .5; 3 SOLUTION RESPIRATORY (INHALATION) at 07:17

## 2017-11-20 RX ADMIN — FLUOXETINE SCH MG: 20 CAPSULE ORAL at 08:38

## 2017-11-20 RX ADMIN — IPRATROPIUM BROMIDE AND ALBUTEROL SULFATE SCH ML: .5; 3 SOLUTION RESPIRATORY (INHALATION) at 20:06

## 2017-11-20 RX ADMIN — BECLOMETHASONE DIPROPIONATE HFA SCH PUFFS: 80 AEROSOL, METERED RESPIRATORY (INHALATION) at 20:25

## 2017-11-20 RX ADMIN — ROPINIROLE HYDROCHLORIDE SCH MG: 1 TABLET, FILM COATED ORAL at 08:38

## 2017-11-20 RX ADMIN — INSULIN ASPART SCH UNITS: 100 INJECTION, SOLUTION INTRAVENOUS; SUBCUTANEOUS at 08:41

## 2017-11-20 RX ADMIN — INSULIN ASPART SCH UNITS: 100 INJECTION, SOLUTION INTRAVENOUS; SUBCUTANEOUS at 11:55

## 2017-11-20 RX ADMIN — HEPARIN SODIUM SCH UNIT: 10000 INJECTION, SOLUTION INTRAVENOUS; SUBCUTANEOUS at 08:37

## 2017-11-20 RX ADMIN — MIRABEGRON SCH MG: 25 TABLET, FILM COATED, EXTENDED RELEASE ORAL at 08:37

## 2017-11-20 RX ADMIN — ROPINIROLE HYDROCHLORIDE SCH MG: 1 TABLET, FILM COATED ORAL at 14:00

## 2017-11-20 RX ADMIN — INSULIN ASPART SCH UNITS: 100 INJECTION, SOLUTION INTRAVENOUS; SUBCUTANEOUS at 17:06

## 2017-11-20 RX ADMIN — Medication SCH MG: at 08:37

## 2017-11-20 RX ADMIN — PRAVASTATIN SODIUM SCH MG: 10 TABLET ORAL at 20:27

## 2017-11-20 RX ADMIN — BUDESONIDE AND FORMOTEROL FUMARATE DIHYDRATE SCH PUFFS: 160; 4.5 AEROSOL RESPIRATORY (INHALATION) at 20:25

## 2017-11-20 RX ADMIN — IPRATROPIUM BROMIDE AND ALBUTEROL SULFATE SCH ML: .5; 3 SOLUTION RESPIRATORY (INHALATION) at 14:33

## 2017-11-20 RX ADMIN — TRAZODONE HYDROCHLORIDE SCH MG: 50 TABLET ORAL at 20:26

## 2017-11-20 RX ADMIN — BECLOMETHASONE DIPROPIONATE HFA SCH PUFFS: 80 AEROSOL, METERED RESPIRATORY (INHALATION) at 08:36

## 2017-11-20 NOTE — DIAGNOSTIC IMAGING REPORT
SINGLE VIEW CHEST



CLINICAL HISTORY:  Dyspnea.



FINDINGS: An AP, portable, upright chest radiograph is compared to study dated

11/19/2017 and correlated with chest CT dated 9/5/2016. The examination is

degraded by portable technique and patient rotation.  The heart is top normal

for projection. There is atherosclerotic calcification of the thoracic aorta.

Emphysema and chronic interstitial thickening are similar to previous. There is

left bibasilar scarring versus atelectasis, with mild elevation of left

hemidiaphragm. No pneumothorax is seen. The skeletal structures are osteopenic.

The bony thorax is grossly intact.



IMPRESSION: Emphysematous change with no acute cardiopulmonary abnormality.







Electronically signed by:  Sukhjinder Hendricks M.D.

11/20/2017 8:02 AM



Dictated Date/Time:  11/20/2017 8:01 AM

## 2017-11-20 NOTE — NEUROLOGY CONSULTATION
Neurology Consultation


Date of Consultation:


Nov 20, 2017.


Attending Physician:


Tessy Escobar M.D.


Primary Care Physician:


eDny Guidry M.D.


Reason for Consultation:


Consultation for tremors and leg weakness


History of Present Illness


Source:  patient, hospital records


This is a 72-year-old female who presents with hyperglycemia. She was recently 

hospitalized couple weeks ago with respiratory failure and COPD exacerbation.





Patient reports that the tremors started after her last hospital discharge. She 

reports that she's been on steroids before without any significant increase in 

her tremors. She does report having lifelong base line action tremors but do 

not significantly affect her activities. She reports her mother has similar 

action tremors. Patient reports that now her tremors affect her handwriting. It 

affects her ability to eat and get food to her mouth. Tremors are worse with 

action. A little bit better at rest. She reports that they're in both hands. 

She denies that they're in the legs. No new numbness or tingling. No changes in 

her vision. No trouble swallowing. No changes with her speech. She denies any 

recent change in her inhalers or breathing treatments





In addition the patient reports that shortly before her last hospitalization 

she noted increasing leg weakness. She reports that it was slow and insidious 

onset. Reports that her legs would get tired and give out. She would be able to 

stand for 10 minutes but would not be able to walk a significant distance. She 

feels that physical therapy at [facility] helps some but not enough. She 

reports that beforehand she was walking with the assistance of a walker 

regularly but now she needs a walker all the time. She denies any pain in 

association. She does have a history of back pain and back surgery but denies 

that any back pain is affecting her ability to walk at this time. She does feel 

like her back is weak and often has to consciously think about standing up 

straight. Denies any radiculopathy type symptoms. Denies any numbness or 

tingling in her lower extremities. She does report that her lower extremities 

are often sore.





CK noted to be 19





In addition at last hospital visit the patient was diagnosed with A. fib. She 

is not on anticoagulation and denies that the topic of anticoagulation was ever 

brought up with her. Per review of cardiology consult at last hospitalization, 

anticoagulation was recommended.





Past Medical/Surgical History


Medical Problems:


(1) Cellulitis


Status: Acute  





(2) COPD exacerbation


Status: Acute  





(3) COPD exacerbation


Status: Acute  





(4) COPD exacerbation


Status: Acute  





(5) Hypoxia


Status: Acute  





(6) PNA (pneumonia)


Status: Acute  





(7) SOB (shortness of breath)


Status: Acute  





(8) Weakness


Status: Acute  





COPD, CHF, A. fib, diabetes, hypertension





Family History


Patient reports mother with likely benign essential tremors. Diabetes, cancer, 

CAD with the family





Social History


Patient reports that before her last to hospitalization she was able to walk 

sometimes unassisted and sometimes with a walker. She is independent in 

activities of daily living.


Smoking Status:  Former smoker


Smokeless Tobacco Use:  No


Alcohol Use:  none


Drug Use:  none


Marital Status:  single, 


Housing Status:  nursing home


Occupation Status:  retired





Allergies


Coded Allergies:  


     Atropine (Verified  Allergy, Intermediate, RASH, 10/27/17)


     Dobutamine (Verified  Allergy, Intermediate, RASH, 10/27/17)





Current Inpatient Medications





Current Inpatient Medications








 Medications


  (Trade)  Dose


 Ordered  Sig/Roya


 Route  Start Time


 Stop Time Status Last Admin


Dose Admin


 


 Heparin Sodium


  (Porcine)


  (Heparin Sq 5000


 Unit/0.5ml)  5,000 unit  Q12H


 SQ  11/18/17 09:00


 12/18/17 08:59  11/20/17 08:37


5,000 UNIT


 


 Acetaminophen


  (Tylenol Tab)  650 mg  Q4H  PRN


 PO  11/18/17 02:30


 12/18/17 02:29   


 


 


 Morphine Sulfate


  (MoRPHine


 SULFATE INJ)  2 mg  Q2H  PRN


 IV  11/18/17 02:30


 12/2/17 02:29   


 


 


 Beclomethasone


 Dipropionate


  (Qvar 80 Mcg Hfa


 Inhaler)  2 puffs  BID


 INH  11/18/17 09:00


 12/18/17 08:59  11/20/17 08:36


2 PUFFS


 


 Budesonide/


 Formoterol


 Fumarate


  (Symbicort 160/


 4.5 Inh)  2 puffs  BID


 INH  11/18/17 09:00


 12/18/17 08:59  11/20/17 08:36


2 PUFFS


 


 Fluoxetine HCl


  (Prozac Cap)  20 mg  QAM


 PO  11/18/17 09:00


 12/18/17 08:59  11/20/17 08:38


20 MG


 


 Albuterol/


 Ipratropium


  (Duoneb)  0.5 ml  Q6RWA


 INH  11/18/17 09:00


 12/18/17 08:59  11/20/17 07:17


0.5 ML


 


 Loratadine


  (Claritin Tab)  10 mg  QAM


 PO  11/18/17 09:00


 12/18/17 08:59  11/20/17 08:37


10 MG


 


 Mirabegron


  (Myrbetriq Er)  25 mg  DAILY


 PO  11/18/17 09:00


 12/18/17 08:59  11/20/17 08:37


25 MG


 


 Nystatin


  (Mycostatin Susp)  5 ml  QID  PRN


 PO  11/18/17 02:45


 11/28/17 02:44  11/19/17 08:30


5 ML


 


 Pravastatin Sodium


  (Pravachol Tab)  10 mg  QPM


 PO  11/18/17 21:00


 12/18/17 20:59  11/19/17 20:21


10 MG


 


 Ropinirole HCl


  (Requip Tab)  2 mg  TID


 PO  11/18/17 09:00


 12/18/17 08:59  11/20/17 08:38


2 MG


 


 Miscellaneous


 Information


  (Consult


 Glycemic


 Management


 Pharmacy)  1 ea  UD  PRN


 N/A  11/18/17 02:45


 12/18/17 02:44   


 


 


 Amiodarone HCl


  (Cordarone Tab)  200 mg  DAILY


 PO  11/18/17 09:00


 12/18/17 08:59  11/20/17 08:38


200 MG


 


 Metoprolol


 Succinate


  (Toprol Xl Tab)  50 mg  PM


 PO  11/18/17 21:00


 12/18/17 20:59  11/19/17 20:22


50 MG


 


 Trazodone HCl


  (Desyrel Tab)  25 mg  HS


 PO  11/18/17 21:00


 12/18/17 20:59  11/19/17 20:21


25 MG


 


 Insulin Aspart


  (novoLOG ASPART)  SLIDING


 SCALE  ACHS


 SC  11/18/17 12:30


 12/18/17 12:29 Future hold 11/20/17 08:41


9 UNITS


 


 Prednisone


  (PredniSONE TAB)  20 mg  DAILY@0800


 PO  11/18/17 13:30


 12/18/17 13:29  11/20/17 08:37


20 MG


 


 Glucose


  (Glucose 40% Gel)  15-30


 GRAMS 15


 GRAMS...  UD  PRN


 PO  11/18/17 16:30


 12/18/17 16:29   


 


 


 Glucose


  (Glucose Chew


 Tab)  4-8


 Tablets 4


 Tabl...  UD  PRN


 PO  11/18/17 16:30


 12/18/17 16:29  11/18/17 16:38


4 TABS


 


 Dextrose


  (Dextrose 50%


 50ML Syringe)  25-50ML OF


 50% DW IV


 FOR...  UD  PRN


 IV  11/18/17 16:30


 12/18/17 16:29   


 


 


 Glucagon


  (Glucagon Inj)  1 mg  UD  PRN


 SQ  11/18/17 16:30


 12/18/17 16:29   


 


 


 Sodium Chloride


  (Ocean Nasal


 Spray)  1 sprays  PRN  PRN


 NA  11/18/17 21:00


 12/18/17 20:59   


 


 


 Insulin Human NPH


  (novoLIN-N NPH)  20 units  QDB


 SC  11/20/17 07:30


 12/20/17 07:29  11/20/17 08:43


20 UNITS











Review of Systems


Complete review of systems otherwise negative except for the above noted in 

history of present illness





Physical Exam


Vital Signs (Past 24 Hrs):











  Date Time  Temp Pulse Resp B/P (MAP) Pulse Ox O2 Delivery O2 Flow Rate FiO2


 


11/20/17 08:11 36.4 60 18 135/78 (97) 96 Room Air  


 


11/20/17 08:00      Nasal Cannula 2.0 


 


11/20/17 07:17  62 16  98 BiPAP/CPAP 2.0 


 


11/20/17 04:26      Nasal Cannula 2.0 


 


11/20/17 03:19 36.5 67 18 110/67 (81) 97 BiPAP 3.0 


 


11/20/17 01:10  61   94  3.0 


 


11/20/17 00:44      Nasal Cannula 2.0 


 


11/19/17 23:58 36.3 64 22 131/73 (92) 95 Nasal Cannula 2.0 


 


11/19/17 20:11      Nasal Cannula 2.0 


 


11/19/17 19:37 36.8 67 20 109/66 (80) 95 Nasal Cannula 2.0 


 


11/19/17 19:20  70 16  94 BiPAP/CPAP 2.0 


 


11/19/17 16:00      Nasal Cannula 2.0 


 


11/19/17 15:37 36.7 64 22 110/62 (78) 90 BiPAP  


 


11/19/17 14:24  70 16  96 BiPAP/CPAP 2.0 


 


11/19/17 12:46  67   93  3.0 


 


11/19/17 12:00      Nasal Cannula 2.0 





      BiPAP  


 


11/19/17 11:32 36.2 63 20 116/73 (87) 96  3.0 








Gen.: Patient is alert and sitting  in chair, in no acute distress.


HEENT: Normocephalic /atraumatic, no scleral icterus


Heart: Regular rate and rhythm


Extremities: No gross deformities or rashes noted


Neurological examination: Mental status:  Patient is alert and oriented  x3. 

Attention and concentration normal for the situation. Good fund of knowledge. 

Remote and recent memory intact. 


Speech is fluent without any dysarthria or aphasia noted


Cranial nerve: Funduscopic examination was unremarkable. No papilledema. Pupils 

equally round and reactive to light. Extraocular muscles intact without 

nystagmus. No facial asymmetry noted. Facial sensation intact. Tongue is 

midline. Good palatal elevation. Good shoulder shrug bilaterally. Hearing 

grossly intact to voice. 


Strength: 5/5 both proximal and distally in all extremities with the exception 

of 4/5 bilateral hip flexion and 4+/5 knee extension. There is no arm drift. 

Tone is normal. No rigidity.


Sensation: Grossly intact to light touch in all extremities.


Deep tendon reflexes: +1 in bilateral biceps, brachioradialis and patellar. 

Toes were downgoing to plantar stimulation


Coordination: Patient had good finger to nose without dysmetria


Station within the chair was normal





Patient did have fairly continuous high-frequency low to moderate amplitude 

tremors in bilateral upper extremities and sometimes head tremor. Certainly 

worsened with movement or activity.





Laboratory Results


Past 24 Hours:


11/20/17 06:55








Red Blood Count 3.49, Mean Corpuscular Volume 96.3, Mean Corpuscular Hemoglobin 

29.8, Mean Corpuscular Hemoglobin Concent 31.0, Mean Platelet Volume 10.8, 

Neutrophils (%) (Auto) 83.0, Lymphocytes (%) (Auto) 10.5, Monocytes (%) (Auto) 

5.5, Eosinophils (%) (Auto) 0.5, Basophils (%) (Auto) 0.0, Neutrophils # (Auto) 

5.33, Lymphocytes # (Auto) 0.67, Monocytes # (Auto) 0.35, Eosinophils # (Auto) 

0.03, Basophils # (Auto) 0.00





11/20/17 06:55

















Test


  11/20/17


06:11 11/20/17


06:55


 


Bedside Glucose


  156 mg/dl


(70-90) 


 


 


White Blood Count


  


  6.41 K/uL


(4.8-10.8)


 


Red Blood Count


  


  3.49 M/uL


(4.2-5.4)


 


Hemoglobin


  


  10.4 g/dL


(12.0-16.0)


 


Hematocrit  33.6 % (37-47) 


 


Mean Corpuscular Volume


  


  96.3 fL


()


 


Mean Corpuscular Hemoglobin


  


  29.8 pg


(25-34)


 


Mean Corpuscular Hemoglobin


Concent 


  31.0 g/dl


(32-36)


 


Platelet Count


  


  108 K/uL


(130-400)


 


Mean Platelet Volume


  


  10.8 fL


(7.4-10.4)


 


Neutrophils (%) (Auto)  83.0 % 


 


Lymphocytes (%) (Auto)  10.5 % 


 


Monocytes (%) (Auto)  5.5 % 


 


Eosinophils (%) (Auto)  0.5 % 


 


Basophils (%) (Auto)  0.0 % 


 


Neutrophils # (Auto)


  


  5.33 K/uL


(1.4-6.5)


 


Lymphocytes # (Auto)


  


  0.67 K/uL


(1.2-3.4)


 


Monocytes # (Auto)


  


  0.35 K/uL


(0.11-0.59)


 


Eosinophils # (Auto)


  


  0.03 K/uL


(0-0.5)


 


Basophils # (Auto)


  


  0.00 K/uL


(0-0.2)


 


RDW Standard Deviation


  


  50.5 fL


(36.4-46.3)


 


RDW Coefficient of Variation


  


  14.4 %


(11.5-14.5)


 


Immature Granulocyte % (Auto)  0.5 % 


 


Immature Granulocyte # (Auto)


  


  0.03 K/uL


(0.00-0.02)


 


Prothrombin Time


  


  10.8 SECONDS


(9.0-12.0)


 


Prothromb Time International


Ratio 


  1.0 (0.9-1.1) 


 


 


Activated Partial


Thromboplast Time 


  22.5 SECONDS


(21.0-31.0)


 


Partial Thromboplastin Ratio  0.9 


 


Anion Gap


  


  9.0 mmol/L


(3-11)


 


Est Creatinine Clear Calc


Drug Dose 


  18.0 ml/min 


 


 


Estimated GFR (


American) 


  18.1 


 


 


Estimated GFR (Non-


American 


  15.6 


 


 


BUN/Creatinine Ratio  36.3 (10-20) 


 


Calcium Level


  


  8.2 mg/dl


(8.5-10.1)


 


Phosphorus Level


  


  5.6 mg/dl


(2.5-4.9)


 


Magnesium Level


  


  2.3 mg/dl


(1.8-2.4)


 


Total Bilirubin


  


  0.4 mg/dl


(0.2-1)


 


Aspartate Amino Transf


(AST/SGOT) 


  8 U/L (15-37) 


 


 


Alanine Aminotransferase


(ALT/SGPT) 


  18 U/L (12-78) 


 


 


Alkaline Phosphatase


  


  53 U/L


()


 


Total Protein


  


  5.4 gm/dl


(6.4-8.2)


 


Albumin


  


  2.8 gm/dl


(3.4-5.0)


 


Globulin


  


  2.6 gm/dl


(2.5-4.0)


 


Albumin/Globulin Ratio  1.1 (0.9-2) 











Impression


This is a 72-year-old female with:


1) benign essential tremors with enhanced physiological tremors likely 

secondary to medications. Most likely medication causing new worsening of 

tremors is amiodarone as the patient reports that she's been on steroids and 

her current inhalers in the past without any worsening of her tremors.





2) bilateral leg weakness appears to be slow in onset and progression. Could be 

secondary to decondition and medical status.





3) A. fib not on anticoagulation





Plan


Recommend discussing with cardiology if the patient can be put on another 

medication other than amiodarone to see if this would improve her enhanced 

physiological tremors.





Recommend intensive PT/OT for lower extremity weakness. If she does not improve 

with physical therapy, could consider an EMG as an outpatient.





Recommendations anticoagulation in the setting of A. fib as previously 

recommended by cardiology. Patient is at high risk for strokes not on 

anticoagulation with A. fib. Fall risk is no longer considered an adequate 

contraindication to anticoagulation.





Thank you for allowing me to participate in this patient's care. If there is 

any questions or concerns, feel free to call/page me.

## 2017-11-20 NOTE — PHARMACY PROGRESS NOTE
Glycemic Control Progress Note


Date of Service


Nov 20, 2017.





Scope


Glycemic Pharmacist consulted for glycemic control to write orders per [facility] 

inpatient glycemic control protocol.





Objective


Accuchecks BSG (last 24hrs):











Test


  11/19/17


11:14 11/19/17


16:32 11/19/17


20:10 11/20/17


06:11


 


Bedside Glucose


  223 mg/dl


(70-90) 172 mg/dl


(70-90) 270 mg/dl


(70-90) 156 mg/dl


(70-90)


 


Test


  11/20/17


06:55 


  


  


 


 


Random Glucose


  150 mg/dl


(70-99) 


  


  


 











Recent Pertinent Medications








The patient is currently receiving:


* Basal insulin:             NPH 10 units every 24 hours in the morning





* Correctional Insulin:   Novolog Correction per scale ACHS


                          Goal Range: Low 120 mg/dL - High 150 mg/dL


                          Correction Factor: 20 mg/dL/unit





* Prandial insulin:         Per carb ratio of 1 unit per 8 grams CHO consumed





Outpatient Anti-Diabetic Meds





glipizide 5 mg PO BIDM





Assessment & Plan


ASSESSMENT:


* See progress note from 11/18/17 for more background info, in short:


 


* Pt receiving SQ basal bolus insulin regimen for hyperglycemia secondary to 

baseline DM (outpatient regimen on hold), stress from recurrent COPD and 

hypercapnia, and prednisone 20 mg PO daily.





* Patient is currently receiving an average of ~60 units of insulin per day


 * 20 units of basal insulin


 


 * 37 units of prandial/correctional insulin


 


 * BSGs ranging 125 -  270 mg/dl over the past 24hrs





* Changes needed to insulin regimen: 


 * AM Fasting BSG = 156 mg/dl. This is slightly above goal range for patient 

based on inpatient targets and co-morbidities. The patient had an adequate drop 

in blood sugar from 270 mg/dL to 156 mg/dL overnight indicating that she still 

has adequate PM insulin secretion. Continuing NPH is an excellent option. Did 

not increase NPH today as the carbohydrate coverage did not appear appropriate 

yesterday. 


 


 * Post-prandial BSGs are elevated and BSGs rise throughout the day therefore 

need to tighten CR.


 


 * Total daily dose = ~60 units. Expect this number to decrease as prednisone 

tapered. 








PLAN FOR INPATIENT GLYCEMIC CONTROL:


* Continuing NPH 20 units SQ qAM


* Continuing correction factor of 20 mg/dl/unit


* TIGHTENING carb ratio to 1 unit per 7 grams CHO consumed


* Continuing goal range of Low 120 mg/dL - High 150 mg/dL








RECOMMENDATIONS FOR DISCHARGE:


* Glipizide is not recommended in individuals with renal impairment. May 

suggest utilizing different oral agent (such as Invokana which caveat that it 

can cause increased risk of amputation) or injectable (such as Victoza, Byetta, 

or even once daily Lantus)








Thank you.

## 2017-11-20 NOTE — PROGRESS NOTE
Subjective


Date of Service:


Nov 20, 2017.


Subjective


Pt evaluation today including:  conversation w/ patient, conversation w/ family

, physical exam, chart review, lab review, review of studies, conversation w/ 

consultant, review of inpatient medication list


Still has a lot of tremor, otherwise doing okay, sitting up in chair, talked 

over the phone, no special complaint


No special complaint





Problem List


Medical Problems:


(1) Cellulitis


Status: Acute  





(2) COPD exacerbation


Status: Acute  





(3) COPD exacerbation


Status: Acute  





(4) COPD exacerbation


Status: Acute  





(5) Hypoxia


Status: Acute  





(6) PNA (pneumonia)


Status: Acute  





(7) SOB (shortness of breath)


Status: Acute  





(8) Weakness


Status: Acute  








Review of Systems


Constitutional:  + weakness, + fatigue, No fever, No chills, No sweats, No 

weight loss, No problem reported


Eyes:  No worsening of vision, No eye pain, No redness, No discharge, No 

diplopia


ENT:  No hearing loss, No unusual epistaxis, No nasal symptoms, No sore throat, 

No tinnitus, No dental problems, No trouble swallowing


Respiratory:  + shortness of breath, No cough, No sputum, No wheezing, No 

dyspnea on exertion, No dyspnea at rest, No hemoptysis


Cardiac:  No chest pain, No orthopnea, No PND, No edema, No claudication, No 

palpitations


Abdomen:  No pain, No nausea, No vomiting, No diarrhea, No constipation


Musculoskeletal:  No joint pain, No muscle pain, No swelling, No calf pain


Female :  No dysuria, No urinary frequency, No hematuria, No incontinence, No 

abnormal vaginal bleeding, No vaginal discharge


Neurologic:  + see HPI, No memory loss, No paralysis, No weakness, No numbness/

tingling, No vertigo, No balance problems


Psychiatric:  No depression symptoms, No anhedonism, No anxiety, No insomnia, 

No substance abuse


Heme:  No abnormal bleeding/bruising, No clotting problems, No swollen lymph 

nodes, No night sweats


Endo:  No fatigue, No excessive thirst, No excessive urination


Skin:  No rash, No itch, No new/changing skin lesions, No color change, No 

bleeding





Objective


Vital Signs











  Date Time  Temp Pulse Resp B/P (MAP) Pulse Ox O2 Delivery O2 Flow Rate FiO2


 


11/20/17 12:00      Nasal Cannula 2.0 


 


11/20/17 11:50 36.7 63 18 108/69 (82) 97  2.0 


 


11/20/17 08:11 36.4 60 18 135/78 (97) 96 Room Air  


 


11/20/17 08:00      Nasal Cannula 2.0 


 


11/20/17 07:17  62 16  98 BiPAP/CPAP 2.0 


 


11/20/17 04:26      Nasal Cannula 2.0 


 


11/20/17 03:19 36.5 67 18 110/67 (81) 97 BiPAP 3.0 


 


11/20/17 01:10  61   94  3.0 


 


11/20/17 00:44      Nasal Cannula 2.0 


 


11/19/17 23:58 36.3 64 22 131/73 (92) 95 Nasal Cannula 2.0 


 


11/19/17 20:11      Nasal Cannula 2.0 


 


11/19/17 19:37 36.8 67 20 109/66 (80) 95 Nasal Cannula 2.0 


 


11/19/17 19:20  70 16  94 BiPAP/CPAP 2.0 


 


11/19/17 16:00      Nasal Cannula 2.0 


 


11/19/17 15:37 36.7 64 22 110/62 (78) 90 BiPAP  


 


11/19/17 14:24  70 16  96 BiPAP/CPAP 2.0 


 


11/19/17 12:46  67   93  3.0 











Physical Exam


General Appearance:  WD/WN, no apparent distress, + obese


Eyes:  normal inspection, PERRL, EOMI, sclerae normal


ENT:  normal ENT inspection, hearing grossly normal, pharynx normal


Neck:  supple, no adenopathy, thyroid normal, no JVD, no carotid bruits, 

trachea midline


Respiratory/Chest:  chest non-tender, lungs clear, normal breath sounds, no 

respiratory distress, no accessory muscle use


Cardiovascular:  regular rate, rhythm, no edema, no gallop, no JVD, no murmur


Abdomen:  normal bowel sounds, non tender, soft, no organomegaly, no pulsatile 

mass, + pertinent finding (Short in place with clear urine)


Extremities:  normal range of motion, non-tender, normal inspection, no pedal 

edema, no calf tenderness, normal capillary refill, pelvis stable, + swelling (

trace swelling)


Neurologic/Psychiatric:  CNs II-XII nml as tested, no motor/sensory deficits, 

alert, normal mood/affect, oriented x 3, + pertinent finding (tremor which is 

not new)


Skin:  normal color, warm/dry, no rash


Lymphatic:  no adenopathy





Laboratory Results





Last 24 Hours








Test


  11/19/17


16:32 11/19/17


20:10 11/20/17


06:11 11/20/17


06:55


 


Bedside Glucose 172 mg/dl  270 mg/dl  156 mg/dl  


 


White Blood Count    6.41 K/uL 


 


Red Blood Count    3.49 M/uL 


 


Hemoglobin    10.4 g/dL 


 


Hematocrit    33.6 % 


 


Mean Corpuscular Volume    96.3 fL 


 


Mean Corpuscular Hemoglobin    29.8 pg 


 


Mean Corpuscular Hemoglobin


Concent 


  


  


  31.0 g/dl 


 


 


Platelet Count    108 K/uL 


 


Mean Platelet Volume    10.8 fL 


 


Neutrophils (%) (Auto)    83.0 % 


 


Lymphocytes (%) (Auto)    10.5 % 


 


Monocytes (%) (Auto)    5.5 % 


 


Eosinophils (%) (Auto)    0.5 % 


 


Basophils (%) (Auto)    0.0 % 


 


Neutrophils # (Auto)    5.33 K/uL 


 


Lymphocytes # (Auto)    0.67 K/uL 


 


Monocytes # (Auto)    0.35 K/uL 


 


Eosinophils # (Auto)    0.03 K/uL 


 


Basophils # (Auto)    0.00 K/uL 


 


RDW Standard Deviation    50.5 fL 


 


RDW Coefficient of Variation    14.4 % 


 


Immature Granulocyte % (Auto)    0.5 % 


 


Immature Granulocyte # (Auto)    0.03 K/uL 


 


Prothrombin Time    10.8 SECONDS 


 


Prothromb Time International


Ratio 


  


  


  1.0 


 


 


Activated Partial


Thromboplast Time 


  


  


  22.5 SECONDS 


 


 


Partial Thromboplastin Ratio    0.9 


 


Sodium Level    138 mmol/L 


 


Potassium Level    4.5 mmol/L 


 


Chloride Level    99 mmol/L 


 


Carbon Dioxide Level    30 mmol/L 


 


Anion Gap    9.0 mmol/L 


 


Blood Urea Nitrogen    104 mg/dl 


 


Creatinine    2.85 mg/dl 


 


Est Creatinine Clear Calc


Drug Dose 


  


  


  18.0 ml/min 


 


 


Estimated GFR (


American) 


  


  


  18.1 


 


 


Estimated GFR (Non-


American 


  


  


  15.6 


 


 


BUN/Creatinine Ratio    36.3 


 


Random Glucose    150 mg/dl 


 


Calcium Level    8.2 mg/dl 


 


Phosphorus Level    5.6 mg/dl 


 


Magnesium Level    2.3 mg/dl 


 


Total Bilirubin    0.4 mg/dl 


 


Aspartate Amino Transf


(AST/SGOT) 


  


  


  8 U/L 


 


 


Alanine Aminotransferase


(ALT/SGPT) 


  


  


  18 U/L 


 


 


Alkaline Phosphatase    53 U/L 


 


Total Protein    5.4 gm/dl 


 


Albumin    2.8 gm/dl 


 


Globulin    2.6 gm/dl 


 


Albumin/Globulin Ratio    1.1 











Assessment and Plan


73 yo female, with end-stage COPD and CHF admitted on 11/18/2017 with severe 

hyperglycemia with BG >600 transferred from [facility].


Patient transferred and initially treated for suspected DKA





Acute on chronic respiratory failure with hypercapnia, stable resolving, 

continue current o2 regime with BiPAP at night, Continue Prednisone 20mg dose, 

continue taper dose by 5mg q 3 days (15 mg on Tuesday) 


 Continue daily Lasix 80mg, inhaler, need to check for need of bipap on 

discharge. 





Chronic COPD, continue inhalers and prednisone. will need very slow taper.





Hypergycemia upon admission, Labile blood glucose on prednisone , Non-gap 

acidosis, pt is not in DKA ,  Insulin drip cancelled


Glipizide on hold, plan to d/c on discharge and should go home on insulin. 





Gait disturbance with leg weakness and tremor, 


Restless leg syndrome


neuro saw patient, continue current medication, recommend anticoagulation for 

stroke prevention


Continue PT/OT, and skilled nursing facility rehabilitation





Hyperkalemia


Hyponatremia 


Chronic diastolic CHF


CAD


Depression


Urinary incontinence


The above condition is stable continue current medication





Atrial fibrillation, likely proximal atrial fibrillation, is on amiodarone and 

NSR,  Continue amiodarone 200mg daily , per recommendation and documented in 

several occasion, has been felt not a t good candidate anticoagulation , and Pt 

refused long term anticoagulation last admission, because this is important 

decision, I talked to patient again, seems she is considering about the 

anticoagulation, discussed the risk and benefit, and patient requests to talk 

to cardiologist to have further discussion, has request cardiology consult











VTE: Heparin SQ , Code status: DNR, planning nursing home rehabilitation, today 

will DC  Short catheter, up and walk with RN , and talk to cardiologist about 

anticoagulation, possible discharge tomorrow


Continued [facility] stay due to:  multiple IV medications needed


Discharge planning:  skilled nursing facility

## 2017-11-21 VITALS
TEMPERATURE: 98.24 F | OXYGEN SATURATION: 91 % | DIASTOLIC BLOOD PRESSURE: 73 MMHG | SYSTOLIC BLOOD PRESSURE: 140 MMHG | HEART RATE: 63 BPM

## 2017-11-21 VITALS — OXYGEN SATURATION: 98 % | HEART RATE: 60 BPM

## 2017-11-21 VITALS
TEMPERATURE: 97.88 F | HEART RATE: 60 BPM | DIASTOLIC BLOOD PRESSURE: 77 MMHG | OXYGEN SATURATION: 97 % | SYSTOLIC BLOOD PRESSURE: 122 MMHG

## 2017-11-21 VITALS — OXYGEN SATURATION: 95 % | HEART RATE: 66 BPM

## 2017-11-21 VITALS — HEART RATE: 60 BPM | OXYGEN SATURATION: 98 %

## 2017-11-21 VITALS
OXYGEN SATURATION: 95 % | TEMPERATURE: 98.24 F | SYSTOLIC BLOOD PRESSURE: 124 MMHG | HEART RATE: 61 BPM | DIASTOLIC BLOOD PRESSURE: 69 MMHG

## 2017-11-21 VITALS
SYSTOLIC BLOOD PRESSURE: 146 MMHG | OXYGEN SATURATION: 98 % | DIASTOLIC BLOOD PRESSURE: 82 MMHG | TEMPERATURE: 98.06 F | HEART RATE: 60 BPM

## 2017-11-21 VITALS
DIASTOLIC BLOOD PRESSURE: 70 MMHG | TEMPERATURE: 97.52 F | HEART RATE: 63 BPM | OXYGEN SATURATION: 97 % | SYSTOLIC BLOOD PRESSURE: 119 MMHG

## 2017-11-21 VITALS — OXYGEN SATURATION: 96 % | HEART RATE: 54 BPM

## 2017-11-21 VITALS
TEMPERATURE: 98.24 F | DIASTOLIC BLOOD PRESSURE: 70 MMHG | OXYGEN SATURATION: 96 % | SYSTOLIC BLOOD PRESSURE: 121 MMHG | HEART RATE: 57 BPM

## 2017-11-21 VITALS — OXYGEN SATURATION: 96 % | HEART RATE: 63 BPM

## 2017-11-21 LAB
BASOPHILS # BLD: 0 K/UL (ref 0–0.2)
BASOPHILS NFR BLD: 0 %
COMPLETE: YES
EOSINOPHIL NFR BLD AUTO: 88 K/UL (ref 130–400)
HCT VFR BLD CALC: 32.7 % (ref 37–47)
IG%: 0.3 %
IMM GRANULOCYTES NFR BLD AUTO: 15.4 %
INR PPP: 1 (ref 0.9–1.1)
LYMPHOCYTES # BLD: 0.95 K/UL (ref 1.2–3.4)
MAGNESIUM SERPL-MCNC: 2.1 MG/DL (ref 1.8–2.4)
MCH RBC QN AUTO: 30.8 PG (ref 25–34)
MCHC RBC AUTO-ENTMCNC: 32.1 G/DL (ref 32–36)
MCV RBC AUTO: 95.9 FL (ref 80–100)
MONOCYTES NFR BLD: 4.5 %
NEUTROPHILS # BLD AUTO: 1.1 %
NEUTROPHILS NFR BLD AUTO: 78.7 %
PARTIAL THROMBOPLASTIN RATIO: 0.9
PHOSPHATE SERPL-MCNC: 4.6 MG/DL (ref 2.5–4.9)
PLATELET # BLD EST: (no result) 10*3/UL
PMV BLD AUTO: 10.7 FL (ref 7.4–10.4)
PROTHROMBIN TIME: 10.8 SECONDS (ref 9–12)
RBC # BLD AUTO: 3.41 M/UL (ref 4.2–5.4)
WBC # BLD AUTO: 6.16 K/UL (ref 4.8–10.8)

## 2017-11-21 RX ADMIN — NYSTATIN PRN ML: 100000 SUSPENSION ORAL at 07:42

## 2017-11-21 RX ADMIN — INSULIN ASPART SCH UNITS: 100 INJECTION, SOLUTION INTRAVENOUS; SUBCUTANEOUS at 20:53

## 2017-11-21 RX ADMIN — NYSTATIN PRN ML: 100000 SUSPENSION ORAL at 20:48

## 2017-11-21 RX ADMIN — BUDESONIDE AND FORMOTEROL FUMARATE DIHYDRATE SCH PUFFS: 160; 4.5 AEROSOL RESPIRATORY (INHALATION) at 20:44

## 2017-11-21 RX ADMIN — BUDESONIDE AND FORMOTEROL FUMARATE DIHYDRATE SCH PUFFS: 160; 4.5 AEROSOL RESPIRATORY (INHALATION) at 07:43

## 2017-11-21 RX ADMIN — INSULIN ASPART SCH UNITS: 100 INJECTION, SOLUTION INTRAVENOUS; SUBCUTANEOUS at 07:39

## 2017-11-21 RX ADMIN — APIXABAN SCH MG: 2.5 TABLET, FILM COATED ORAL at 09:56

## 2017-11-21 RX ADMIN — ROPINIROLE HYDROCHLORIDE SCH MG: 1 TABLET, FILM COATED ORAL at 20:47

## 2017-11-21 RX ADMIN — HEPARIN SODIUM SCH UNIT: 10000 INJECTION, SOLUTION INTRAVENOUS; SUBCUTANEOUS at 07:39

## 2017-11-21 RX ADMIN — METOPROLOL SUCCINATE SCH MG: 50 TABLET, EXTENDED RELEASE ORAL at 20:47

## 2017-11-21 RX ADMIN — PRAVASTATIN SODIUM SCH MG: 10 TABLET ORAL at 20:46

## 2017-11-21 RX ADMIN — IPRATROPIUM BROMIDE AND ALBUTEROL SULFATE SCH ML: .5; 3 SOLUTION RESPIRATORY (INHALATION) at 14:27

## 2017-11-21 RX ADMIN — ROPINIROLE HYDROCHLORIDE SCH MG: 1 TABLET, FILM COATED ORAL at 14:37

## 2017-11-21 RX ADMIN — ROPINIROLE HYDROCHLORIDE SCH MG: 1 TABLET, FILM COATED ORAL at 07:42

## 2017-11-21 RX ADMIN — APIXABAN SCH MG: 2.5 TABLET, FILM COATED ORAL at 20:46

## 2017-11-21 RX ADMIN — INSULIN ASPART SCH UNITS: 100 INJECTION, SOLUTION INTRAVENOUS; SUBCUTANEOUS at 17:32

## 2017-11-21 RX ADMIN — BECLOMETHASONE DIPROPIONATE HFA SCH PUFFS: 80 AEROSOL, METERED RESPIRATORY (INHALATION) at 20:45

## 2017-11-21 RX ADMIN — TRAZODONE HYDROCHLORIDE SCH MG: 50 TABLET ORAL at 20:49

## 2017-11-21 RX ADMIN — MIRABEGRON SCH MG: 25 TABLET, FILM COATED, EXTENDED RELEASE ORAL at 07:43

## 2017-11-21 RX ADMIN — IPRATROPIUM BROMIDE AND ALBUTEROL SULFATE SCH ML: .5; 3 SOLUTION RESPIRATORY (INHALATION) at 07:10

## 2017-11-21 RX ADMIN — BECLOMETHASONE DIPROPIONATE HFA SCH PUFFS: 80 AEROSOL, METERED RESPIRATORY (INHALATION) at 07:43

## 2017-11-21 RX ADMIN — AMIODARONE HYDROCHLORIDE SCH MG: 200 TABLET ORAL at 07:42

## 2017-11-21 RX ADMIN — INSULIN ASPART SCH UNITS: 100 INJECTION, SOLUTION INTRAVENOUS; SUBCUTANEOUS at 12:10

## 2017-11-21 RX ADMIN — Medication SCH MG: at 07:41

## 2017-11-21 RX ADMIN — IPRATROPIUM BROMIDE AND ALBUTEROL SULFATE SCH ML: .5; 3 SOLUTION RESPIRATORY (INHALATION) at 19:40

## 2017-11-21 RX ADMIN — FLUOXETINE SCH MG: 20 CAPSULE ORAL at 07:41

## 2017-11-21 NOTE — CARDIOLOGY CONSULTATION
DATE OF CONSULTATION:  11/21/2017

 

REASON FOR CONSULTATION:  Right-sided heart failure, end-stage COPD,

paroxysmal atrial fibrillation, question tremors related to amiodarone and

potential need for anticoagulation.

 

REQUESTING:  Jorge KENNEDY MD.

 

CONSULTANT:  Mohit Giordano DO, [facility].

 

Dear Dr. Kennedy:

 

Thank you for requesting cardiology consultation on Bonnie with regards to her

paroxysmal atrial fibrillation, end-stage COPD, need for anticoagulation, and

tremors potentially worsened by amiodarone.

 

As you know her history is well documented.  She just left [facility] on the 8th of November with pneumonia and COPD exacerbation

and significantly elevated pCO2 levels.  She was discharged to an assisted

living facility.

 

This admission she was admitted with blood sugars greater than 600 with

hyponatremia and hyperkalemia.  She was aggressively treated with saline and

insulin and improvement in her electrolytes.  She is feeling much better

currently.

 

She had tremors during her last admission, it was unclear what the etiology

may have been, although there was a discussion even the last admission that

amiodarone can make tremors, underlying tremors worse.  She denies any

palpitations, lightheadedness or dizziness.  She denies any chest pain, chest

pressure, chest heaviness.  She does have bruising on her arms from her

current admission and her last admission.  She denies any dark stools or

black stools.

 

Neurology saw the patient and is concerned that her tremors may be related to

amiodarone and had a question with regards to anticoagulation.  As has been

well documented on the chart, she has refused anticoagulation up until this

point.

 

She denies any lower extremity edema, increased abdominal distension.  She

denies any worsening underlying right-sided heart failure symptoms.  She

notes the tremors are interfering with her ability to use her cell phone and

to type and to write and it is affecting her overall quality of life

significantly.  The rest of review of systems otherwise negative.

 

SOCIAL HISTORY:  She is a former smoker.  She is .  She lives in an

assisted living facility currently.

 

FAMILY HISTORY:  Positive for cancer, diabetes and heart disease.

 

PAST MEDICAL HISTORY:

1.  Asymptomatic atrial fibrillation, placed on amiodarone earlier in

November 2017.

2.  Previous refusal of anticoagulation.

3.  Cor pulmonale with severe end-stage COPD.

4.  End-stage COPD requiring 3 liters of nasal cannula and BIPAP at night.

5.  Chronic diastolic and right-sided heart failure.

6.  History of severe pulmonary hypertension.

7.  Diabetes mellitus type 2.

8.  Chronic kidney disease.

9.  Hyperlipidemia.

10.  Hypertension.

11.  Restless leg syndrome.

 

ALLERGIES:  ATROPINE AND DOBUTAMINE.

 

MEDICATIONS:  Her inpatient medications were reviewed in detail.

 

PHYSICAL EXAMINATION:

GENERAL:  She is awake, alert, oriented x3.  She looks more frail than when I

last saw her 2 weeks ago.

VITAL SIGNS:  Her heart rate is 60, respirations 18, blood pressure 146/82. 

Her sats 98% on 2 liters.

HEENT:  2+ carotid upstrokes.  No evidence of carotid bruits.  Her jugular

venous pressure cannot be assessed due to her neck size.  Sclerae is

anicteric.  Her hearing is normal.

LUNGS:  Globally decreased breath sounds but no rales, rhonchi or wheezing.

HEART:  Regular rate and rhythm.  No appreciable murmurs, rubs or gallops.

ABDOMEN:  Soft, nontender, nondistended, positive bowel sounds.

EXTREMITIES:  No clubbing, cyanosis or edema.

PSYCHIATRIC:  Affect appeared appropriate.

 

LABORATORY STUDIES:  Hemoglobin 10.5, platelet count of 88,000.  Sodium 138,

potassium 4.5.  Her BUN was 104 with a creatinine of 2.85.  Her AST was 8. 

Her ALT was 18.  Her coags were normal.

 

DIAGNOSTIC STUDIES:  Chest x-ray from 11/20/2017 emphysematous changes, no

acute cardiopulmonary abnormality.

 

IMPRESSION:

1.  Paroxysmal atrial fibrillation with a significantly elevated CHADS2-VASc

score.

2.  Previous refusal to consider anticoagulation now potentially interested.

3.  Tremors potentially worsened by amiodarone.

4.  Acute kidney injury on chronic kidney disease.

5.  Severe end-stage chronic obstructive pulmonary disease with cor pulmonale

and severe pulmonary hypertension.

 

As I discussed with Bonnie, there are 2 options.  One would be to reduce her

amiodarone dose to 100 mg a day understanding that is not the normal

maintenance dose, but just to see if her tremors improve while still giving her

some protection that we may reduce her risk of recurrent AFib in the future.

As you may remember during her last hospitalization, she was unaware that she

was in atrial fibrillation.

 

The second option we discussed was stopping the amiodarone completely leaving

her on Toprol to reduce her risk of recurrent AFib understanding her risk of

AFib is much higher off of amiodarone.

 

Given the tremor, she wishes to stop her amiodarone completely.  I did

discuss with her it will take 6 weeks to get out of her system and she may

not notice an improvement for a month or so.

 

With regards to anticoagulation, we discussed the risks and benefits of

anticoagulation in detail.  Risks including bleeding or bruising, especially

if she falls, the risk of intracranial hemorrhage or subdural hemorrhage if

she were to fall when she walks without her walker.  This is exacerbated by

the fact that she has significant leg weakness as well.

 

We discussed the risks and benefits of Coumadin versus newer anticoagulants

like Eliquis.

 

After long discussions, she is agreeable to Eliquis 5 mg b.i.d.  Given the

criteria for dosing, she is under the age of 78, she weighs more than 60

kilos but her creatinine is greater than 1.5 and was only 1 of 3 criteria for

a lower dose.  She should be on the 5 mg b.i.d. dosing.

 

All this was discussed with the patient in detail.

 

Thank you for allowing us to participate in her care.

 

 

 

LIZBETH

## 2017-11-21 NOTE — CLINICAL DOCUMENTATION QUERY
IRAIDA Villanueva :



**** CLINICAL DOCUMENTATION QUERIES****



QUERY 1 OF 2

Patient is a 72 year old female admitted for evaluation and treatment of acute on chronic 
respiratory failure and hyperglycemia.  H&P documented DKA.  This has been ruled out.  As 
patient is a known type 2 diabetic on recent stressor of steroid taper and acute on 
chronic diastolic CHF, consider documentation as suggested below.  Thank you. 



In your clinical opinion is this patient being managed for:

    

                        ( x) Type 2 diabetes mellitus possible Hyperosmolar Hyperglycemic 
Nonketotic Syndrome

                        (  ) Not Agree



                        (  ) Other explanation of clinical findings (Please Explain)

                        (  ) Unable to determine (Please Define)

                        (  ) Need to Discuss

                        



The medical record reflects the following clinical findings, treatment, and risk factors.  
  



Clinical Indicators: As above

Treatment: IVF, insulin, serial chemistries, serial VBG's.

Risk Factors: Age, type 2 DM, steroids, acute illness



QUERY 2 OF 2

Admission BUN, creatinine, and estimated GFR were 142 mg/dl, 4.09 mg/dl, and 10 ml/min.  
Historical documentation includes a diagnosis of CKD stage 3.  Neither condition noted in 
attending documentation.  As appropriate, please consider documentation as suggested 
below.  Thank you. 



In your clinical opinion is this patient being managed for:

    

                        (  x) JANES, POA, on CKD stage 3

POA, on CKD stage 3

Type 2 diabetes mellitus possible Hyperosmolar Hyperglycemic Nonketotic Syndrome



                        (  ) Not Agree



                        (  ) Other explanation of clinical findings (Please Explain)

                        (  ) Unable to determine (Please Define)

                        (  ) Need to Discuss

                        



The medical record reflects the following clinical findings, treatment, and risk factors.  
  



Clinical Indicators: As above, hyperkalemia, ?prerenal/hypovolemia associated with HHNKS

Treatment: IVF, serial chemistries

Risk Factors: Age, HHNKS, diuretics, fluid restriction



Please clarify and document your clinical opinion in the progress notes and discharge 
summary. Terms such as "probable", "suspected", "likely", "questionable", "possible", or 
"still to be ruled out" are acceptable. 



*****IF IN AGREEMENT, YOU MUST DOCUMENT ABOVE DIAGNOSTIC STATEMENT IN DAILY PROGRESS NOTES 
AND DISCHARGE SUMMARY. This document is not part of the patient's record.*****

Thank You, Jason Silvestre, [phone]

## 2017-11-21 NOTE — PHARMACY PROGRESS NOTE
Glycemic Control Progress Note


Date of Service


Nov 21, 2017.





Scope


Glycemic Pharmacist consulted for glycemic control to write orders per [facility] 

inpatient glycemic control protocol.





Objective


Accuchecks BSG (last 24hrs):











Test


  11/20/17


11:20 11/20/17


16:21 11/20/17


19:53


 


Bedside Glucose


  169 mg/dl


(70-90) 315 mg/dl


(70-90) 323 mg/dl


(70-90)











Recent Pertinent Medications








The patient is currently receiving:


* Basal insulin:             NPH 20 units every 24 hours in the morning





* Correctional Insulin:   Novolog Correction per scale ACHS


                          Goal Range: Low 120 mg/dL - High 150 mg/dL


                          Correction Factor: 20 mg/dL/unit





* Prandial insulin:         Per carb ratio of 1 unit per 7 grams CHO consumed





Outpatient Anti-Diabetic Meds


Glipizide 5 mg PO BID





Assessment & Plan


ASSESSMENT:


* See progress note from 11/18/17 for more background info, in short:


 


* Pt receiving SQ basal bolus insulin regimen for hyperglycemia secondary to 

baseline DM (outpatient regimen on hold), stress from recurrent COPD and 

hypercapnia, and prednisone 20 mg PO daily.





* Patient is currently receiving an average of ~60 units of insulin per day


 * 20 units of basal insulin


 


 * 33 units of prandial/correctional insulin


 


 * BSGs ranging 156 -  323 mg/dl over the past 24hrs





* Changes needed to insulin regimen: 


 * AM Fasting BSG = 146 mg/dl. This is slightly above goal range for patient 

based on inpatient targets and co-morbidities. The patient had an adequate drop 

in blood sugar from 323 mg/dL to 146 mg/dL overnight indicating that she still 

has adequate PM insulin secretion. Continuing NPH is an excellent option. Even 

though prednisone decreased yesterday, increased NPH as proportionally it was 

not appropriate yesterday. If prednisone 20 mg PO daily was continued would 

have given 40 units of NPH instead gave 30 units for reduced dose.  


 


 * Post-prandial BSGs are elevated and BSGs rise throughout the day. Please 

note pharmacist was not contact about the 315 mg/dL and 323 mg/dL last night. 

Tightened carbohydrate ratio slightly this morning. NPH was given slightly 

later today. Lunch blood sugar was much lower than breakfast blood sugar. Did 

not loosen carbohydrate ratio as patient has large spike after lunch as this is 

when prednisone effects are prominent. 


 


 * Total daily dose = ~60 units. Expect this number to decrease as prednisone 

tapered. 








PLAN FOR INPATIENT GLYCEMIC CONTROL:


* INCREASE NPH to 30 units SQ qAM


* Continuing correction factor of 20 mg/dl/unit


* Continuing carb ratio to 1 unit per 8 grams CHO consumed


* TIGHTENING goal range to Low 110 mg/dL - High 140 mg/dL








RECOMMENDATIONS FOR DISCHARGE:


* Glipizide is not recommended in individuals with renal impairment. May 

suggest utilizing different oral agent (such as Invokana which caveat that it 

can cause increased risk of amputation) or injectable (such as Victoza, Byetta, 

or even once daily Lantus)








Thank you.

## 2017-11-21 NOTE — PROGRESS NOTE
Subjective


Date of Service:


Nov 21, 2017.


Subjective


Pt evaluation today including:  conversation w/ patient, physical exam, chart 

review, lab review, review of studies, review of inpatient medication list


Patient is doing therapy when seeing her, again has tremor, no other complaint,





Problem List


Medical Problems:


(1) Cellulitis


Status: Acute  





(2) COPD exacerbation


Status: Acute  





(3) COPD exacerbation


Status: Acute  





(4) COPD exacerbation


Status: Acute  





(5) Hypoxia


Status: Acute  





(6) PNA (pneumonia)


Status: Acute  





(7) SOB (shortness of breath)


Status: Acute  





(8) Weakness


Status: Acute  








Review of Systems


Constitutional:  + weakness


Eyes:  No worsening of vision, No eye pain, No redness, No discharge, No 

diplopia


ENT:  No hearing loss, No unusual epistaxis, No nasal symptoms, No sore throat, 

No tinnitus, No dental problems, No trouble swallowing


Respiratory:  No cough, No sputum, No wheezing, No shortness of breath, No 

dyspnea on exertion, No dyspnea at rest, No hemoptysis


Cardiac:  No chest pain, No orthopnea, No PND, No edema, No claudication, No 

palpitations


Abdomen:  No pain, No nausea, No vomiting, No diarrhea, No constipation


Musculoskeletal:  No joint pain, No muscle pain, No swelling, No calf pain


Female :  No dysuria, No urinary frequency, No hematuria, No incontinence, No 

abnormal vaginal bleeding, No vaginal discharge


Neurologic:  No memory loss, No paralysis, No weakness, No numbness/tingling, 

No vertigo, No balance problems


Psychiatric:  No depression symptoms, No anhedonism, No anxiety, No insomnia, 

No substance abuse


Heme:  No abnormal bleeding/bruising, No clotting problems, No swollen lymph 

nodes, No night sweats


Endo:  No fatigue, No excessive thirst, No excessive urination


Skin:  No rash, No itch, No new/changing skin lesions, No color change, No 

bleeding





Objective


Vital Signs











  Date Time  Temp Pulse Resp B/P (MAP) Pulse Ox O2 Delivery O2 Flow Rate FiO2


 


11/21/17 08:00      Nasal Cannula 3.0 


 


11/21/17 07:45 36.7 60 18 146/82 (103) 98  2.0 


 


11/21/17 07:09  60 16  98 Nasal Cannula 2.0 


 


11/21/17 04:00      BiPAP  


 


11/21/17 03:20 36.4 63 20 119/70 (86) 97 BiPAP 3.0 


 


11/21/17 00:20  66   95  3.0 


 


11/20/17 23:59      Nasal Cannula 2.0 


 


11/20/17 23:58 37.0 67 20 161/81 (107) 95 Nasal Cannula 3.0 


 


11/20/17 20:06 36.4 65 18 126/73 (90) 95 Nasal Cannula 3.0 


 


11/20/17 20:06  66 16  94 Nasal Cannula 2.0 


 


11/20/17 20:00     95 Nasal Cannula 2.0 


 


11/20/17 16:33 36.4 70 22 148/85 (106) 92 Nasal Cannula 3.0 


 


11/20/17 16:00      Room Air  


 


11/20/17 14:33  66 16  94 Nasal Cannula 2.0 


 


11/20/17 12:00      Nasal Cannula 2.0 


 


11/20/17 11:50 36.7 63 18 108/69 (82) 97  2.0 











Physical Exam


General Appearance:  WD/WN, no apparent distress, + obese


Eyes:  normal inspection, PERRL, EOMI, sclerae normal


ENT:  normal ENT inspection, hearing grossly normal, pharynx normal


Neck:  supple, no adenopathy, thyroid normal, no JVD, no carotid bruits, 

trachea midline


Respiratory/Chest:  chest non-tender, normal breath sounds, no respiratory 

distress, no accessory muscle use, + decreased breath sounds


Cardiovascular:  regular rate, rhythm, no edema, no gallop, no JVD, no murmur


Abdomen:  normal bowel sounds, non tender, soft, no organomegaly, no pulsatile 

mass


Extremities:  normal range of motion, non-tender, normal inspection, no pedal 

edema, no calf tenderness, normal capillary refill, pelvis stable


Neurologic/Psychiatric:  CNs II-XII nml as tested, no motor/sensory deficits, 

alert, normal mood/affect, oriented x 3, + pertinent finding (tremor)


Skin:  normal color, warm/dry, no rash


Lymphatic:  no adenopathy





Laboratory Results





Last 24 Hours








Test


  11/20/17


11:20 11/20/17


16:21 11/20/17


19:53 11/21/17


06:53


 


Bedside Glucose 169 mg/dl  315 mg/dl  323 mg/dl  


 


White Blood Count    6.16 K/uL 


 


Red Blood Count    3.41 M/uL 


 


Hemoglobin    10.5 g/dL 


 


Hematocrit    32.7 % 


 


Mean Corpuscular Volume    95.9 fL 


 


Mean Corpuscular Hemoglobin    30.8 pg 


 


Mean Corpuscular Hemoglobin


Concent 


  


  


  32.1 g/dl 


 


 


Platelet Count    88 K/uL 


 


Mean Platelet Volume    10.7 fL 


 


Neutrophils (%) (Auto)    78.7 % 


 


Lymphocytes (%) (Auto)    15.4 % 


 


Monocytes (%) (Auto)    4.5 % 


 


Eosinophils (%) (Auto)    1.1 % 


 


Basophils (%) (Auto)    0.0 % 


 


Neutrophils # (Auto)    4.84 K/uL 


 


Lymphocytes # (Auto)    0.95 K/uL 


 


Monocytes # (Auto)    0.28 K/uL 


 


Eosinophils # (Auto)    0.07 K/uL 


 


Basophils # (Auto)    0.00 K/uL 


 


RDW Standard Deviation    50.1 fL 


 


RDW Coefficient of Variation    14.4 % 


 


Immature Granulocyte % (Auto)    0.3 % 


 


Immature Granulocyte # (Auto)    0.02 K/uL 


 


Platelet Estimate    DECREASED 


 


Prothrombin Time    10.8 SECONDS 


 


Prothromb Time International


Ratio 


  


  


  1.0 


 


 


Activated Partial


Thromboplast Time 


  


  


  22.6 SECONDS 


 


 


Partial Thromboplastin Ratio    0.9 


 


Phosphorus Level    4.6 mg/dl 


 


Magnesium Level    2.1 mg/dl 











Assessment and Plan


71 yo female, with end-stage COPD and CHF admitted on 11/18/2017 with severe 

hyperglycemia with BG >600 transferred from [facility].


Patient transferred and initially treated for suspected DKA





Acute on chronic respiratory failure with hypercapnia, 


COPD exacerbation 


stable resolving, 


continue current o2 regime with BiPAP at night, she has BiPAP machine in the 

nursing home 


Continue Prednisone  taper dose by 5mg q 3 days


Continue daily Lasix 80mg, inhaler





Hypergycemia upon admission, Labile blood glucose on prednisone , blood glucose 

monitoring 300, pharmacy is adjusting dose 


Following admission Non-gap acidosis, pt is not in DKA ,  Insulin drip cancelled


Glipizide on hold, plan to d/c on discharge and should go home on insulin. 





Gait disturbance with leg weakness and tremor, possible amiodarone related, 

detail see below


Restless leg syndrome


neuro saw patient, continue current medication, recommend anticoagulation for 

stroke prevention


See below


Continue PT/OT, and skilled nursing facility rehabilitation








Atrial fibrillation, likely proximal atrial fibrillation, is on amiodarone and 

NSR,  is being on amiodarone 200mg daily ,  cardiology discussed with patient , 

recommended to reduce her amiodarone dose to 100 mg a day , or to stop the 

amiodarone completely, continue on Toprol


Start Eliquis  5 mg by mouth twice a day per recommend CARDIOLOGY, patient 

understand and risk,





Hyperkalemia


Hyponatremia 


Chronic diastolic CHF


CAD


Depression


Urinary incontinence


The above condition is stable continue current medication, will watch in 

telemetry monitor when stopped amiodarone today, start Eliquis, has case 

manager check for co-pay, patient is high risk of readmission











VTE: DC Heparin SQ , patient is on eliquis  already


Code status: DNR, planning nursing home rehabilitation,


Continued [facility] stay due to:  multiple IV medications needed


Discharge planning:  skilled nursing facility

## 2017-11-22 VITALS
HEART RATE: 67 BPM | TEMPERATURE: 98.06 F | OXYGEN SATURATION: 93 % | DIASTOLIC BLOOD PRESSURE: 68 MMHG | SYSTOLIC BLOOD PRESSURE: 114 MMHG

## 2017-11-22 VITALS — OXYGEN SATURATION: 94 % | HEART RATE: 60 BPM

## 2017-11-22 VITALS
SYSTOLIC BLOOD PRESSURE: 114 MMHG | TEMPERATURE: 98.06 F | OXYGEN SATURATION: 93 % | DIASTOLIC BLOOD PRESSURE: 68 MMHG | HEART RATE: 67 BPM

## 2017-11-22 VITALS
DIASTOLIC BLOOD PRESSURE: 82 MMHG | OXYGEN SATURATION: 98 % | HEART RATE: 59 BPM | SYSTOLIC BLOOD PRESSURE: 134 MMHG | TEMPERATURE: 97.7 F

## 2017-11-22 LAB
ANION GAP SERPL CALC-SCNC: 7 MMOL/L (ref 3–11)
BASOPHILS # BLD: 0 K/UL (ref 0–0.2)
BASOPHILS NFR BLD: 0 %
BUN SERPL-MCNC: 85 MG/DL (ref 7–18)
BUN/CREAT SERPL: 36 (ref 10–20)
CALCIUM SERPL-MCNC: 8.7 MG/DL (ref 8.5–10.1)
CHLORIDE SERPL-SCNC: 101 MMOL/L (ref 98–107)
CO2 SERPL-SCNC: 29 MMOL/L (ref 21–32)
COMPLETE: YES
CREAT CL PREDICTED SERPL C-G-VRATE: 21.8 ML/MIN
CREAT SERPL-MCNC: 2.37 MG/DL (ref 0.6–1.2)
EOSINOPHIL NFR BLD AUTO: 81 K/UL (ref 130–400)
GLUCOSE SERPL-MCNC: 197 MG/DL (ref 70–99)
HCT VFR BLD CALC: 32.4 % (ref 37–47)
IG%: 0.2 %
IMM GRANULOCYTES NFR BLD AUTO: 17.4 %
INR PPP: 1.1 (ref 0.9–1.1)
LYMPHOCYTES # BLD: 1 K/UL (ref 1.2–3.4)
MAGNESIUM SERPL-MCNC: 2 MG/DL (ref 1.8–2.4)
MAGNESIUM SERPL-MCNC: 2.2 MG/DL (ref 1.8–2.4)
MCH RBC QN AUTO: 31.1 PG (ref 25–34)
MCHC RBC AUTO-ENTMCNC: 32.7 G/DL (ref 32–36)
MCV RBC AUTO: 95 FL (ref 80–100)
MONOCYTES NFR BLD: 5.2 %
NEUTROPHILS # BLD AUTO: 2.1 %
NEUTROPHILS NFR BLD AUTO: 75.1 %
PARTIAL THROMBOPLASTIN RATIO: 0.9
PHOSPHATE SERPL-MCNC: 4.3 MG/DL (ref 2.5–4.9)
PMV BLD AUTO: 9.7 FL (ref 7.4–10.4)
POTASSIUM SERPL-SCNC: 4.8 MMOL/L (ref 3.5–5.1)
PROTHROMBIN TIME: 11.3 SECONDS (ref 9–12)
RBC # BLD AUTO: 3.41 M/UL (ref 4.2–5.4)
SODIUM SERPL-SCNC: 136 MMOL/L (ref 136–145)
WBC # BLD AUTO: 5.75 K/UL (ref 4.8–10.8)

## 2017-11-22 RX ADMIN — BUDESONIDE AND FORMOTEROL FUMARATE DIHYDRATE SCH PUFFS: 160; 4.5 AEROSOL RESPIRATORY (INHALATION) at 07:51

## 2017-11-22 RX ADMIN — INSULIN ASPART SCH UNITS: 100 INJECTION, SOLUTION INTRAVENOUS; SUBCUTANEOUS at 07:46

## 2017-11-22 RX ADMIN — IPRATROPIUM BROMIDE AND ALBUTEROL SULFATE SCH ML: .5; 3 SOLUTION RESPIRATORY (INHALATION) at 07:17

## 2017-11-22 RX ADMIN — INSULIN ASPART SCH UNITS: 100 INJECTION, SOLUTION INTRAVENOUS; SUBCUTANEOUS at 11:57

## 2017-11-22 RX ADMIN — NYSTATIN PRN ML: 100000 SUSPENSION ORAL at 07:50

## 2017-11-22 RX ADMIN — APIXABAN SCH MG: 2.5 TABLET, FILM COATED ORAL at 07:50

## 2017-11-22 RX ADMIN — Medication SCH MG: at 07:51

## 2017-11-22 RX ADMIN — FLUOXETINE SCH MG: 20 CAPSULE ORAL at 07:50

## 2017-11-22 RX ADMIN — MIRABEGRON SCH MG: 25 TABLET, FILM COATED, EXTENDED RELEASE ORAL at 07:49

## 2017-11-22 RX ADMIN — ROPINIROLE HYDROCHLORIDE SCH MG: 1 TABLET, FILM COATED ORAL at 07:52

## 2017-11-22 RX ADMIN — BECLOMETHASONE DIPROPIONATE HFA SCH PUFFS: 80 AEROSOL, METERED RESPIRATORY (INHALATION) at 07:52

## 2017-11-22 NOTE — PROGRESS NOTE
Subjective


Date of Service:


Nov 22, 2017.


Subjective


Pt evaluation today including:  conversation w/ patient, conversation w/ family

, physical exam, chart review, lab review, review of studies, conversation w/ 

consultant, review of inpatient medication list


Still has obvious tremor or shaky, complain is difficult to feeding herself, 

was up and walk with the therapist yesterday,


Heart rate is 60s and is normal sinus resume after stopping amiodarone,





Problem List


Medical Problems:


(1) Cellulitis


Status: Acute  





(2) COPD exacerbation


Status: Acute  





(3) COPD exacerbation


Status: Acute  





(4) COPD exacerbation


Status: Acute  





(5) Hypoxia


Status: Acute  





(6) PNA (pneumonia)


Status: Acute  





(7) SOB (shortness of breath)


Status: Acute  





(8) Weakness


Status: Acute  








Review of Systems


Constitutional:  + weakness, No fever, No chills, No sweats, No weight loss, No 

fatigue, No problem reported


Eyes:  No worsening of vision, No eye pain, No redness, No discharge, No 

diplopia


ENT:  No hearing loss, No unusual epistaxis, No nasal symptoms, No sore throat, 

No tinnitus, No dental problems, No trouble swallowing


Respiratory:  + cough, + shortness of breath (is in baseline), No sputum, No 

wheezing, No dyspnea on exertion, No dyspnea at rest, No hemoptysis


Cardiac:  No chest pain, No orthopnea, No PND, No edema, No claudication, No 

palpitations


Abdomen:  No pain, No nausea, No vomiting, No diarrhea, No constipation


Musculoskeletal:  No joint pain, No muscle pain, No swelling, No calf pain


Female :  No dysuria, No urinary frequency, No hematuria, No incontinence, No 

abnormal vaginal bleeding, No vaginal discharge


Neurologic:  No memory loss, No paralysis, No weakness, No numbness/tingling, 

No vertigo, No balance problems


Psychiatric:  No depression symptoms, No anhedonism, No anxiety, No insomnia, 

No substance abuse


Heme:  No abnormal bleeding/bruising, No clotting problems, No swollen lymph 

nodes, No night sweats


Endo:  No fatigue, No excessive thirst, No excessive urination


Skin:  No rash, No itch, No new/changing skin lesions, No color change, No 

bleeding





Objective


Vital Signs











  Date Time  Temp Pulse Resp B/P (MAP) Pulse Ox O2 Delivery O2 Flow Rate FiO2


 


11/22/17 08:05 36.7 67 20 114/68 (83) 93 Nasal Cannula 3.0 


 


11/22/17 08:00      Nasal Cannula 3.0 


 


11/22/17 07:17  60 16  94 Nasal Cannula 3.0 


 


11/22/17 04:00      BiPAP  


 


11/22/17 03:20 36.5 59 20 134/82 (99) 98 BiPAP  


 


11/21/17 23:59      BiPAP  


 


11/21/17 23:28 36.6 60 18 122/77 (92) 97 BiPAP  


 


11/21/17 23:19  63   96  3.0 


 


11/21/17 20:00      Nasal Cannula 3.0 


 


11/21/17 19:40  54 16  96 Nasal Cannula 2.0 


 


11/21/17 19:34 36.8 57 18 121/70 (87) 96 Nasal Cannula 2.0 


 


11/21/17 16:45 36.8 63 20 140/73 (95) 91 Nasal Cannula 3.0 


 


11/21/17 16:00      Nasal Cannula 3.0 


 


11/21/17 14:27  60 16  98 Nasal Cannula 2.0 


 


11/21/17 12:00      Nasal Cannula 3.0 


 


11/21/17 11:31 36.8 61 18 124/69 (87) 95  3.0 











Physical Exam


General Appearance:  WD/WN, no apparent distress, + obese


Eyes:  normal inspection, PERRL, EOMI, sclerae normal


ENT:  normal ENT inspection, hearing grossly normal, pharynx normal


Neck:  supple, no adenopathy, thyroid normal, no JVD, no carotid bruits, 

trachea midline


Respiratory/Chest:  chest non-tender, normal breath sounds, no respiratory 

distress, no accessory muscle use, + decreased breath sounds


Cardiovascular:  regular rate, rhythm, no edema, no gallop, no JVD, no murmur


Abdomen:  normal bowel sounds, non tender, soft, no organomegaly, no pulsatile 

mass


Extremities:  normal range of motion, non-tender, normal inspection, no pedal 

edema, no calf tenderness, normal capillary refill, pelvis stable


Neurologic/Psychiatric:  CNs II-XII nml as tested, no motor/sensory deficits, 

alert, normal mood/affect, oriented x 3, + pertinent finding (tremor or shaky)


Skin:  normal color, warm/dry, no rash


Lymphatic:  no adenopathy





Laboratory Results





Last 24 Hours








Test


  11/21/17


11:29 11/21/17


16:13 11/21/17


20:17 11/22/17


06:59


 


Bedside Glucose 92 mg/dl  162 mg/dl  204 mg/dl  


 


White Blood Count    5.75 K/uL 


 


Red Blood Count    3.41 M/uL 


 


Hemoglobin    10.6 g/dL 


 


Hematocrit    32.4 % 


 


Mean Corpuscular Volume    95.0 fL 


 


Mean Corpuscular Hemoglobin    31.1 pg 


 


Mean Corpuscular Hemoglobin


Concent 


  


  


  32.7 g/dl 


 


 


Platelet Count    81 K/uL 


 


Mean Platelet Volume    9.7 fL 


 


Neutrophils (%) (Auto)    75.1 % 


 


Lymphocytes (%) (Auto)    17.4 % 


 


Monocytes (%) (Auto)    5.2 % 


 


Eosinophils (%) (Auto)    2.1 % 


 


Basophils (%) (Auto)    0.0 % 


 


Neutrophils # (Auto)    4.32 K/uL 


 


Lymphocytes # (Auto)    1.00 K/uL 


 


Monocytes # (Auto)    0.30 K/uL 


 


Eosinophils # (Auto)    0.12 K/uL 


 


Basophils # (Auto)    0.00 K/uL 


 


RDW Standard Deviation    49.8 fL 


 


RDW Coefficient of Variation    14.3 % 


 


Immature Granulocyte % (Auto)    0.2 % 


 


Immature Granulocyte # (Auto)    0.01 K/uL 


 


Prothrombin Time    11.3 SECONDS 


 


Prothromb Time International


Ratio 


  


  


  1.1 


 


 


Activated Partial


Thromboplast Time 


  


  


  23.2 SECONDS 


 


 


Partial Thromboplastin Ratio    0.9 


 


Phosphorus Level    4.3 mg/dl 


 


Magnesium Level    2.2 mg/dl 


 


Test


  11/22/17


08:54 


  


  


 


 


Sodium Level 136 mmol/L    


 


Potassium Level 4.8 mmol/L    


 


Chloride Level 101 mmol/L    


 


Carbon Dioxide Level 29 mmol/L    


 


Anion Gap 7.0 mmol/L    


 


Blood Urea Nitrogen 85 mg/dl    


 


Creatinine 2.37 mg/dl    


 


Est Creatinine Clear Calc


Drug Dose 21.8 ml/min 


  


  


  


 


 


Estimated GFR (


American) 22.6 


  


  


  


 


 


Estimated GFR (Non-


American 19.5 


  


  


  


 


 


BUN/Creatinine Ratio 36.0    


 


Random Glucose 197 mg/dl    


 


Calcium Level 8.7 mg/dl    


 


Magnesium Level 2.0 mg/dl    











Assessment and Plan


71 yo female, with end-stage COPD and CHF admitted on 11/18/2017 with severe 

hyperglycemia with BG >600 transferred from [facility].


Patient transferred and initially treated for suspected DKA





Hypergycemia upon admission, Labile blood glucose while on prednisone, improving

,


, pharmacy is adjusting dose , blood glucose will be better controlled because 

is tapering off prednisone


Following admission Non-gap acidosis, pt is not in DKA ,  Insulin drip cancelled


Glipizide on hold, plan to d/c on discharge and should go home on insulin. 





Acute on chronic respiratory failure with hypercapnia, likely secondary to COPD 

exacerbation 


stable resolving, 


continue current o2 regime with BiPAP at night, she has BiPAP machine in the 

nursing home 


Continue Prednisone  taper dose by 5mg q 3 days, today will be secondary to of 

15 mg by mouth daily


Continue daily Lasix 80mg, continue inhaler





Gait disturbance with leg weakness and tremor, possible multiple factorial, 

possible worsening from restless leg syndrome , or is amiodarone related, 

detail see below


Restless leg syndrome neuro saw patient, continue current medication, recommend 

anticoagulation for stroke prevention, See below


Continue PT/OT, and skilled nursing facility rehabilitation





Atrial fibrillation, likely proximal atrial fibrillation, is on amiodarone and 

NSR,  is being on amiodarone 200mg daily ,  cardiology discussed with patient , 

recommended to reduce her amiodarone dose to 100 mg a day , or to stop the 

amiodarone completely, continue on Toprol, continue on normal sinus resume


Start Eliquis  5 mg by mouth twice a day per recommend CARDIOLOGY, patient 

understand and weaning to take risks





Hyperkalemia


Hyponatremia 


Chronic diastolic CHF


CAD


Depression


Urinary incontinence


The above condition is stable continue current medication, will watch in 

telemetry monitor when stopped amiodarone today, start Eliquis, has case 

manager check for co-pay, patient is high risk of readmission








VTE: DC Heparin SQ , patient is on eliquis  already


Code status: DNR, planning nursing home rehabilitation, patient was stable to 

go today


Continued [facility] stay due to:  multiple IV medications needed


Discharge planning:  skilled nursing facility

## 2017-11-22 NOTE — DISCHARGE SUMMARY
Discharge Summary


Date of Service


Nov 22, 2017.





Discharge Summary


Admission Date:


Nov 18, 2017 at 02:22


Discharge Date:  Nov 22, 2017


Discharge Disposition:  Skilled nursing facility


Principal Diagnosis:  Hypergycemia


Problems/Secondary Diagnoses:


Hypergycemia upon admission, 


Labile blood glucose while on prednisone,


Acute on chronic respiratory failure with hypercapnia, likely secondary to COPD 

exacerbation 


Gait disturbance with leg weakness and tremor, 


restless leg syndrome , 


Atrial fibrillation, have started Eliquis  5 mg by mouth twice a day per 

recommend CARDIOLOGY,


Immunizations:  


   Have You Had Influenza Vaccine:  Yes


   Influenza Vaccine Date:  Oct 17, 2013


   History of Tetanus Vaccine?:  Yes


   Tetanus Immunization Date:  Oct 8, 2013


   History of Pneumococcal:  Yes


   Pneumococcal Date:  Nov 17, 2010


   History of Hepatitis B Vaccine:  No


Procedures:


No


Consultations:


Cardiologist





Medication Reconciliation


New Medications:  


Apixaban (Eliquis) 2.5 Mg Tab


5 MG PO BID for 30 Days, #120 TAB





Insulin Aspart (Novolog Flexpen) 100 Units/Ml Inj


0 UNITS SC ACHS for 30 Days


ISS goal of range, 110 to 140


 


 CF 20 mg/dl/unit


 INS: Cho ratio= 1 unit per 10 grams cho consumed


Insulin Human NPH (Novolin N) 100 Units/Ml Susp


30 UNITS SC QDB for 30 Days





 


Continued Medications:  


Beclomethasone Dip (Qvar) 80 Mcg/Act Aer


80 MCG INH BID





Budesonide/Formoterol Fumarate (Symbicort 160-4.5 Mcg/Act) 60 Puffs/Inhaler Aero


2 PUFFS INH BID, #1 INHALER 2 Refills





Cyanocobalamin (Vitamin B12 500MCG) 500 Mcg Tab


1000 MCG PO DAILY





Ferrous Sulfate (Kp Ferrous Sulfate) 325 Mg Tab


1 TAB PO DAILY for 30 Days, #30 TAB 3 Refills





Fluoxetine Hcl (Prozac) 20 Mg Cap


20 MG PO QAM





Furosemide (Furosemide) 40 Mg Tab


80 MG PO DAILY





Ipratropium-Albuterol (Duoneb) 3 Ml Nebu


1 TREATMENT INH Q6HWA for 30 Days, #150 UNITS


Use 1 neb treatment every 6 hours while awake.  May use


 every 2 hours as needed for shortness of breath & wheezing.


Loratadine (Claritin) 10 Mg Tab


10 MG PO QAM for 30 Days, TAB





Melatonin (Melatonin Maximum Strengt) 5 Mg Tab


1 TAB PO HS for 30 Days, #30 TAB





Metoprolol Succ (Toprol Xl) (Toprol-Xl) 50 Mg Tabcr


1 TAB PO PM for 30 Days, #30 TAB 5 Refills





Mirabegron (Myrbetriq Er) 25 Mg Tab


25 MG PO DAILY, TAB





Nystatin (Nystatin) 5 Ml Susp


5 ML PO QID for 11 Days, #220 ML


Take 5 ml (1 teaspoon) by mouth four times a day.


Potassium Ext Rel (Klor-Con) 20 Meq Tabcr


20 MEQ PO DAILY, TAB





Pravastatin Sodium (Pravastatin Sodium) 10 Mg Tab


10 MG PO QPM





Prednisone (Prednisone) 5 Mg Tab


15 MG PO DAILY for 5 Days





Prednisone (Prednisone) 5 Mg Tab


5 MG PO DAILY for 4 Days, #4 TAB





Prednisone (Prednisone) 10 Mg Tab


10 MG PO DAILY for 4 Days, #4 TAB





Ropinirole (Requip) 2 Mg Tab


2 MG PO TID





Trazodone Hcl (Trazodone) 50 Mg Tab


25 MG PO HS, TAB





 


Discontinued Medications:  


Amiodarone Hcl (Cordarone) 200 Mg Tab


200 MG PO DAILY, TAB





Glipizide (Glipizide) 5 Mg Tab


5 MG PO BID





Prednisone (Prednisone) 20 Mg Tab


20 MG PO DAILY for 3 Days, #3 TAB











Discharge Exam


See today's progress note


Review of Systems:  


   Constitutional:  + problem reported (See today's progress note)


Physical Exam:  


   General Appearance:  + pertinent finding (See today's progress note)





Hospital Course


71 yo female, with end-stage COPD and CHF admitted on 11/18/2017 with severe 

hyperglycemia with BG >600 transferred from [facility].


Patient transferred and initially treated for suspected DKA





Hypergycemia upon admission, Labile blood glucose while on prednisone, improving

,


, pharmacy is adjusting dose , blood glucose will be better controlled because 

is tapering off prednisone


Following admission Non-gap acidosis, pt is not in DKA ,  Insulin drip cancelled


Glipizide on hold, plan to d/c on discharge and should go home on insulin. 





Acute on chronic respiratory failure with hypercapnia, likely secondary to COPD 

exacerbation 


stable resolving, 


continue current o2 regime with BiPAP at night, she has BiPAP machine in the 

nursing home 


Continue Prednisone  taper dose by 5mg q 3 days, today will be secondary to of 

15 mg by mouth daily


Continue daily Lasix 80mg, continue inhaler





Gait disturbance with leg weakness and tremor, possible multiple factorial, 

possible worsening from restless leg syndrome , or is amiodarone related, 

detail see below


Restless leg syndrome neuro saw patient, continue current medication, recommend 

anticoagulation for stroke prevention, See below


Continue PT/OT, and skilled nursing facility rehabilitation





Atrial fibrillation, likely proximal atrial fibrillation, is on amiodarone and 

NSR,  is being on amiodarone 200mg daily ,  cardiology discussed with patient , 

recommended to reduce her amiodarone dose to 100 mg a day , or to stop the 

amiodarone completely, continue on Toprol, continue on normal sinus resume


Start Eliquis  5 mg by mouth twice a day per recommend CARDIOLOGY, patient 

understand and weaning to take risks





Hyperkalemia


Hyponatremia 


Chronic diastolic CHF


CAD


Depression


Urinary incontinence


The above condition is stable continue current medication, will watch in 

telemetry monitor when stopped amiodarone today, start Eliquis, has case 

manager check for co-pay, patient is high risk of readmission








VTE: DC Heparin SQ , patient is on eliquis  already


Code status: DNR, planning nursing home rehabilitation, patient was stable to 

go today





Instructions / Follow-Up


You have Hypergycemia upon admission, Labile blood glucose while on prednisone, 

improving,


You need to continue Lantus insulin plus insulin sliding scale as ordered


You have Acute on chronic respiratory failure with hypercapnia, likely 

secondary to COPD exacerbation 


You need to continue continue current o2 regime with BiPAP at night, she has 

BiPAP machine in the nursing home 


We are doing Prednisone  taper dose by 5mg q 3 days, today will be secondary 

day  of 15 mg by mouth daily, 


Continue daily Lasix 80mg, continue inhaler





You have Gait disturbance with leg weakness and tremor, possible multiple 

factorial, possible worsening from restless leg syndrome , or is amiodarone 

related, detail see below Restless leg syndrome neuro saw patient, continue 

current medication, need to Continue PT/OT, and skilled nursing facility 

rehabilitation, follow-up with neurologist





You have Atrial fibrillation, likely proximal atrial fibrillation, we have 

discontinue amiodarone completely, continue on Toprol, 


We have started Eliquis  5 mg by mouth twice a day per recommend CARDIOLOGY, 

patient really pay attention about fall precaution








- you need to follow up with your primary care physician in 1 week,


- take medication as instructed, never overdose or any misuse, or take with 

alcohol,   because misuse of medicine may  cause organ damage or death, call 

your primary care physician if have questions of medicaitons.


- call your primary care physician OR go to local emergency room if has any 

fever/chill, chest pain, shortness of breathing, nausea/vomiting/abdominal pain

, facial droop/slurry speech/local weakness, or if has any questions. 


- fall precaution


- diet as instructed


- you need to follow up with your subspecialists, such as pulmonologist, 

cardiologist and neurologist 


- you should understand that it is important to follow up the above instruction

, and "not following the above instruction" may cause delayed or missed care of 

your medical conditions which may cause permanent organ damage and even death.


Total Time Spent:  Greater than 30 minutes


This includes examination of the patient, discharge planning, medication 

reconciliation, and communication with other providers.





Discharge Instructions


Please refer to the electronic Patient Visit Report (Discharge Instructions) 

for additional information.





Additional Copies To


Deny Guidry M.D.

## 2018-02-14 NOTE — DIAGNOSTIC IMAGING REPORT
CHEST ONE VIEW PORTABLE



HISTORY:      DYSPNEA



COMPARISON: Chest 11/1/2017.



FINDINGS: No change in the left basilar linear densities. The right lung remains

clear. The heart is stable in size. No pneumothorax. Suspect a trace left

pleural effusion. No evidence for pulmonary edema.



IMPRESSION:

Left basilar linear densities and a trace left pleural effusion remain

unchanged.







Electronically signed by:  Wagner Michel M.D.

11/18/2017 7:46 AM



Dictated Date/Time:  11/18/2017 7:46 AM From: Ester Aden  To: MARGIE Ceja  Sent: 2/14/2018 10:25 AM PST  Subject: Test Result Question    I have a question about VITAMIN D,25 HYDROXY resulted on 2/12/18, 1:58 PM.    I am only taking what is included in my calcium, 800 IU I believe.     What additional should I be taking?    Thank you! The rest of the test results look good. My TSH went way down even though still normal.     I will be doing the sleep study March 1st.     I haven't heard from the weight management yet.     I'm wondering if I should have my hormones checked? And or possible food sensitivities.     Have a great day,  Ester Aden

## 2018-03-17 ENCOUNTER — HOSPITAL ENCOUNTER (INPATIENT)
Dept: HOSPITAL 45 - C.EDB | Age: 73
LOS: 2 days | Discharge: HOME | DRG: 193 | End: 2018-03-19
Attending: FAMILY MEDICINE | Admitting: HOSPITALIST
Payer: COMMERCIAL

## 2018-03-17 VITALS — HEART RATE: 82 BPM | OXYGEN SATURATION: 98 %

## 2018-03-17 VITALS — OXYGEN SATURATION: 94 %

## 2018-03-17 VITALS
DIASTOLIC BLOOD PRESSURE: 78 MMHG | OXYGEN SATURATION: 96 % | TEMPERATURE: 97.7 F | SYSTOLIC BLOOD PRESSURE: 145 MMHG | HEART RATE: 80 BPM

## 2018-03-17 VITALS — HEART RATE: 85 BPM | OXYGEN SATURATION: 94 %

## 2018-03-17 VITALS
TEMPERATURE: 98.06 F | HEART RATE: 83 BPM | OXYGEN SATURATION: 94 % | SYSTOLIC BLOOD PRESSURE: 160 MMHG | DIASTOLIC BLOOD PRESSURE: 91 MMHG

## 2018-03-17 VITALS
TEMPERATURE: 98.6 F | OXYGEN SATURATION: 90 % | HEART RATE: 87 BPM | SYSTOLIC BLOOD PRESSURE: 138 MMHG | DIASTOLIC BLOOD PRESSURE: 72 MMHG

## 2018-03-17 VITALS
HEIGHT: 63 IN | WEIGHT: 199.52 LBS | BODY MASS INDEX: 35.35 KG/M2 | BODY MASS INDEX: 35.35 KG/M2 | HEIGHT: 63 IN | WEIGHT: 199.52 LBS

## 2018-03-17 VITALS — OXYGEN SATURATION: 100 %

## 2018-03-17 VITALS — HEART RATE: 90 BPM | OXYGEN SATURATION: 100 %

## 2018-03-17 VITALS — HEART RATE: 88 BPM | OXYGEN SATURATION: 95 %

## 2018-03-17 DIAGNOSIS — G25.81: ICD-10-CM

## 2018-03-17 DIAGNOSIS — I27.81: ICD-10-CM

## 2018-03-17 DIAGNOSIS — M99.08: ICD-10-CM

## 2018-03-17 DIAGNOSIS — I50.32: ICD-10-CM

## 2018-03-17 DIAGNOSIS — E11.22: ICD-10-CM

## 2018-03-17 DIAGNOSIS — J44.1: ICD-10-CM

## 2018-03-17 DIAGNOSIS — J44.0: ICD-10-CM

## 2018-03-17 DIAGNOSIS — I13.0: ICD-10-CM

## 2018-03-17 DIAGNOSIS — Z79.51: ICD-10-CM

## 2018-03-17 DIAGNOSIS — E78.5: ICD-10-CM

## 2018-03-17 DIAGNOSIS — Z88.8: ICD-10-CM

## 2018-03-17 DIAGNOSIS — Z79.4: ICD-10-CM

## 2018-03-17 DIAGNOSIS — J96.22: ICD-10-CM

## 2018-03-17 DIAGNOSIS — N18.4: ICD-10-CM

## 2018-03-17 DIAGNOSIS — Z79.899: ICD-10-CM

## 2018-03-17 DIAGNOSIS — F32.9: ICD-10-CM

## 2018-03-17 DIAGNOSIS — J18.9: Primary | ICD-10-CM

## 2018-03-17 DIAGNOSIS — Z99.81: ICD-10-CM

## 2018-03-17 DIAGNOSIS — E66.01: ICD-10-CM

## 2018-03-17 DIAGNOSIS — J96.21: ICD-10-CM

## 2018-03-17 DIAGNOSIS — Z87.891: ICD-10-CM
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
## 2018-03-17 LAB
ALBUMIN SERPL-MCNC: 3.9 GM/DL (ref 3.4–5)
ALP SERPL-CCNC: 175 U/L (ref 45–117)
ALT SERPL-CCNC: 20 U/L (ref 12–78)
AST SERPL-CCNC: 18 U/L (ref 15–37)
BASOPHILS # BLD: 0.02 K/UL (ref 0–0.2)
BASOPHILS NFR BLD: 0.3 %
BUN SERPL-MCNC: 27 MG/DL (ref 7–18)
CA-I BLD-SCNC: 1.16 MMOL/L (ref 1.12–1.32)
CALCIUM SERPL-MCNC: 8.9 MG/DL (ref 8.5–10.1)
CO2 SERPL-SCNC: 36 MMOL/L (ref 21–32)
CREAT BLD-MCNC: 1.6 MG/DL (ref 0.6–1.3)
CREAT SERPL-MCNC: 1.68 MG/DL (ref 0.6–1.2)
EOS ABS #: 0.16 K/UL (ref 0–0.5)
EOSINOPHIL NFR BLD AUTO: 135 K/UL (ref 130–400)
GLUCOSE SERPL-MCNC: 170 MG/DL (ref 70–99)
HCT VFR BLD CALC: 38.3 % (ref 37–47)
HGB BLD-MCNC: 12.7 G/DL (ref 12–16)
IG#: 0.02 K/UL (ref 0–0.02)
IMM GRANULOCYTES NFR BLD AUTO: 9.9 %
INFLUENZA B ANTIGEN: (no result)
INR PPP: 1 (ref 0.9–1.1)
ISTAT POTASSIUM: 3.9 MEQ/L (ref 3.3–5)
LYMPHOCYTES # BLD: 0.64 K/UL (ref 1.2–3.4)
MCH RBC QN AUTO: 31.7 PG (ref 25–34)
MCHC RBC AUTO-ENTMCNC: 33.2 G/DL (ref 32–36)
MCV RBC AUTO: 95.5 FL (ref 80–100)
MONO ABS #: 0.49 K/UL (ref 0.11–0.59)
MONOCYTES NFR BLD: 7.6 %
NEUT ABS #: 5.13 K/UL (ref 1.4–6.5)
NEUTROPHILS # BLD AUTO: 2.5 %
NEUTROPHILS NFR BLD AUTO: 79.4 %
PMV BLD AUTO: 10.1 FL (ref 7.4–10.4)
POTASSIUM SERPL-SCNC: 3.8 MMOL/L (ref 3.5–5.1)
PROT SERPL-MCNC: 7.6 GM/DL (ref 6.4–8.2)
PTT PATIENT: 25.7 SECONDS (ref 21–31)
RED CELL DISTRIBUTION WIDTH CV: 14.2 % (ref 11.5–14.5)
RED CELL DISTRIBUTION WIDTH SD: 49.9 FL (ref 36.4–46.3)
SODIUM BLD-SCNC: 141 MEQ/L (ref 135–144)
SODIUM SERPL-SCNC: 138 MMOL/L (ref 136–145)
WBC # BLD AUTO: 6.46 K/UL (ref 4.8–10.8)

## 2018-03-17 RX ADMIN — ROPINIROLE HYDROCHLORIDE SCH MG: 1 TABLET, FILM COATED ORAL at 16:12

## 2018-03-17 RX ADMIN — METHYLPREDNISOLONE SODIUM SUCCINATE SCH MLS/MIN: 1 INJECTION, POWDER, FOR SOLUTION INTRAMUSCULAR; INTRAVENOUS at 16:13

## 2018-03-17 RX ADMIN — TRAZODONE HYDROCHLORIDE SCH MG: 50 TABLET ORAL at 20:59

## 2018-03-17 RX ADMIN — METOPROLOL SUCCINATE SCH MG: 50 TABLET, EXTENDED RELEASE ORAL at 21:00

## 2018-03-17 RX ADMIN — IPRATROPIUM BROMIDE AND ALBUTEROL SULFATE SCH ML: .5; 3 SOLUTION RESPIRATORY (INHALATION) at 16:00

## 2018-03-17 RX ADMIN — IPRATROPIUM BROMIDE AND ALBUTEROL SULFATE SCH ML: .5; 3 SOLUTION RESPIRATORY (INHALATION) at 19:14

## 2018-03-17 RX ADMIN — IPRATROPIUM BROMIDE AND ALBUTEROL SULFATE SCH ML: .5; 3 SOLUTION RESPIRATORY (INHALATION) at 10:22

## 2018-03-17 RX ADMIN — IPRATROPIUM BROMIDE AND ALBUTEROL SULFATE SCH ML: .5; 3 SOLUTION RESPIRATORY (INHALATION) at 19:15

## 2018-03-17 RX ADMIN — METHYLPREDNISOLONE SODIUM SUCCINATE SCH MLS/MIN: 1 INJECTION, POWDER, FOR SOLUTION INTRAMUSCULAR; INTRAVENOUS at 21:02

## 2018-03-17 RX ADMIN — INSULIN ASPART SCH UNITS: 100 INJECTION, SOLUTION INTRAVENOUS; SUBCUTANEOUS at 20:59

## 2018-03-17 RX ADMIN — ENOXAPARIN SODIUM SCH MG: 30 INJECTION SUBCUTANEOUS at 16:13

## 2018-03-17 RX ADMIN — INSULIN ASPART SCH UNITS: 100 INJECTION, SOLUTION INTRAVENOUS; SUBCUTANEOUS at 17:39

## 2018-03-17 RX ADMIN — BUDESONIDE AND FORMOTEROL FUMARATE DIHYDRATE SCH PUFFS: 160; 4.5 AEROSOL RESPIRATORY (INHALATION) at 21:03

## 2018-03-17 RX ADMIN — IPRATROPIUM BROMIDE AND ALBUTEROL SULFATE SCH ML: .5; 3 SOLUTION RESPIRATORY (INHALATION) at 15:13

## 2018-03-17 RX ADMIN — PRAVASTATIN SODIUM SCH MG: 10 TABLET ORAL at 21:01

## 2018-03-17 RX ADMIN — ROPINIROLE HYDROCHLORIDE SCH MG: 1 TABLET, FILM COATED ORAL at 21:01

## 2018-03-17 NOTE — EMERGENCY ROOM VISIT NOTE
History


Report prepared by Dreadibanaly:  Twyla Durham


Under the Supervision of:  Dr. Clem Mims M.D.


First contact with patient:  09:23


Stated Complaint:  BREATHING DIFFICULTY





History of Present Illness


The patient is a 73 year old white female with a past medical history of COPD, 

CHF, DM, DKA, hypertension and renal failure who presents to the ED with a cc 

of worsening breathing difficulty beginning a few days PTA. Positive dry, non-

productive cough, congestion. Negative fever, chest pain, abdominal pain, 

swelling in the legs, weight gain or recent surgery. Nebulizer treatments have 

provided minimal relief. She wears 3L NC chronically and uses Bi-Pap at night. 

She denies any history of DVT or PE's. Nursing reports the patients O2 went 

down into the 50's when she got up to use the bathroom without her NC. The 

patient did take her morning medications today.





   Source of History:  patient


   Onset:  past few days PTA


   Position:  chest


   Timing:  worsening


   Modifying Factors (Relieving):  oxygen, other (Nebulizer treatments)


   Associated Symptoms:  + cough (and congestion), No fevers, No chest pain, No 

abdominal pain





Review of Systems


See HPI for pertinent positives and negatives.  A total of ten systems were 

reviewed and were otherwise negative.





Past Medical & Surgical


Medical Problems:


(1) CHF (congestive heart failure)


(2) CHF exacerbation


(3) Diab Sanjuana Wo Compl, Type Ii Or Unspec Type, Uncontrolled


(4) DKA (diabetic ketoacidoses)


(5) Hyperlipidemia Nec/Nos


(6) Hypertension Nos


(7) Hypoxemia


(8) Influenza A


(9) Oral candidiasis


(10) Pneumonia


(11) Renal failure


(12) Respiratory failure, acute-on-chronic








Family History





Cancer


Diabetes mellitus


FH: heart disease





Social History


Smoking Status:  Former Smoker


Alcohol Use:  none


Drug Use:  none


Marital Status:  single, 


Housing Status:  nursing home


Occupation Status:  retired





Current/Historical Medications


Scheduled


Budesonide/Formoterol Fumarate (Symbicort 160-4.5 Mcg/Act), 2 PUFFS INH BID


Cyanocobalamin (Vitamin B12 500MCG), 1,000 MCG PO DAILY


Ferrous Sulfate (Kp Ferrous Sulfate), 1 TAB PO DAILY


Fluoxetine Hcl (Prozac), 20 MG PO QAM


Furosemide (Furosemide), 80 MG PO DAILY


Insulin Aspart (Novolog Flexpen), 0 UNITS SC ACHS


Insulin Human NPH (Novolin N), 30 UNITS SC QDB


Ipratropium-Albuterol (Duoneb), 1 TREATMENT INH Q6HWA


Loratadine (Claritin), 10 MG PO QAM


Metoprolol Succ (Toprol Xl) (Toprol-Xl), 1 TAB PO PM


Mirabegron (Myrbetriq Er), 25 MG PO DAILY


Nystatin (Nystatin), 5 ML PO QID


Potassium Ext Rel (Klor-Con), 20 MEQ PO DAILY


Pravastatin Sodium (Pravastatin Sodium), 10 MG PO QPM


Ropinirole (Requip), 2 MG PO TID


Trazodone Hcl (Trazodone), 25 MG PO HS





Allergies


Coded Allergies:  


     Atropine (Verified  Allergy, Intermediate, RASH, 3/17/18)


     Dobutamine (Verified  Allergy, Intermediate, RASH, 3/17/18)





Physical Exam


Vital Signs











  Date Time  Temp Pulse Resp B/P (MAP) Pulse Ox O2 Delivery O2 Flow Rate FiO2


 


3/17/18 11:45  85 20 141/67 98 BiPAP  


 


3/17/18 11:27  90   100   35


 


3/17/18 11:20  86 24 125/66 99 Nasal Cannula 3.0 


 


3/17/18 10:23  82 26 143/67 98 Nasal Cannula 3.0 


 


3/17/18 09:54     98 Nasal Cannula 3.0 


 


3/17/18 09:36     95 Nasal Cannula 3.0 


 


3/17/18 09:35     100 Non-Rebreather  


 


3/17/18 09:34 37.4 83 24 174/85 50 Room Air  


 


3/17/18 09:34     50 Room Air  


 


3/17/18 09:27  86      











Physical Exam


GENERAL: Awake, alert, well-appearing, in mild distress


HENT: Normocephalic, atraumatic. 


EYES: Normal conjunctiva. Sclera non-icteric.


NECK: Supple. No nuchal rigidity. FROM.


RESPIRATORY: Nasal canula in place. Diffuse inspiratory and expiratory 

wheezing. Mild tachypnea. No rhonchi, wheezing, crackles


CARDIAC: RRR, no MRG


ABDOMEN: Soft, NTND, BS+


MSK: No chest wall TTP, 1-2+ pedal edema of bilateral lower extremities 


NEURO: GCS 15, CN 2-12 intact, moves all 4s on command


SKIN: Dry, scaly skin noted over lower extremities. No rash or jaundice noted.





Medical Decision & Procedures


ER Provider


Diagnostic Interpretation:


Radiology results as stated below per my review and radiologist interpretation: 





CHEST ONE VIEW PORTABLE





CLINICAL HISTORY: EVALUATE RESPIRATORY DISTRESS.DYSPNEA dyspnea





COMPARISON STUDY:  11/20/2017





FINDINGS: Prominent pulmonary vasculature. Infiltrate left base. Diaphragms are


smooth. 





IMPRESSION:  Parenchymal infiltrate left base. Pulmonary vascular congestion. 





The above report was generated using voice recognition software.  It may contain


grammatical, syntax or spelling errors.





Electronically signed by:  Alfa Schultz M.D.


3/17/2018 10:09 AM





Laboratory Results


3/17/18 09:30








Red Blood Count 4.01, Mean Corpuscular Volume 95.5, Mean Corpuscular Hemoglobin 

31.7, Mean Corpuscular Hemoglobin Concent 33.2, Mean Platelet Volume 10.1, 

Neutrophils (%) (Auto) 79.4, Lymphocytes (%) (Auto) 9.9, Monocytes (%) (Auto) 

7.6, Eosinophils (%) (Auto) 2.5, Basophils (%) (Auto) 0.3, Neutrophils # (Auto) 

5.13, Lymphocytes # (Auto) 0.64, Monocytes # (Auto) 0.49, Eosinophils # (Auto) 

0.16, Basophils # (Auto) 0.02





3/17/18 09:30

















Test


  3/17/18


09:30 3/17/18


09:44 3/17/18


09:59 3/17/18


10:01


 


White Blood Count


  6.46 K/uL


(4.8-10.8) 


  


  


 


 


Red Blood Count


  4.01 M/uL


(4.2-5.4) 


  


  


 


 


Hemoglobin


  12.7 g/dL


(12.0-16.0) 


  


  


 


 


Hematocrit 38.3 % (37-47)    


 


Mean Corpuscular Volume


  95.5 fL


() 


  


  


 


 


Mean Corpuscular Hemoglobin


  31.7 pg


(25-34) 


  


  


 


 


Mean Corpuscular Hemoglobin


Concent 33.2 g/dl


(32-36) 


  


  


 


 


Platelet Count


  135 K/uL


(130-400) 


  


  


 


 


Mean Platelet Volume


  10.1 fL


(7.4-10.4) 


  


  


 


 


Neutrophils (%) (Auto) 79.4 %    


 


Lymphocytes (%) (Auto) 9.9 %    


 


Monocytes (%) (Auto) 7.6 %    


 


Eosinophils (%) (Auto) 2.5 %    


 


Basophils (%) (Auto) 0.3 %    


 


Neutrophils # (Auto)


  5.13 K/uL


(1.4-6.5) 


  


  


 


 


Lymphocytes # (Auto)


  0.64 K/uL


(1.2-3.4) 


  


  


 


 


Monocytes # (Auto)


  0.49 K/uL


(0.11-0.59) 


  


  


 


 


Eosinophils # (Auto)


  0.16 K/uL


(0-0.5) 


  


  


 


 


Basophils # (Auto)


  0.02 K/uL


(0-0.2) 


  


  


 


 


RDW Standard Deviation


  49.9 fL


(36.4-46.3) 


  


  


 


 


RDW Coefficient of Variation


  14.2 %


(11.5-14.5) 


  


  


 


 


Immature Granulocyte % (Auto) 0.3 %    


 


Immature Granulocyte # (Auto)


  0.02 K/uL


(0.00-0.02) 


  


  


 


 


Prothrombin Time


  10.5 SECONDS


(9.0-12.0) 


  


  


 


 


Prothromb Time International


Ratio 1.0 (0.9-1.1) 


  


  


  


 


 


Activated Partial


Thromboplast Time 25.7 SECONDS


(21.0-31.0) 


  


  


 


 


Partial Thromboplastin Ratio 1.0    


 


Est Creatinine Clear Calc


Drug Dose 31.5 ml/min 


  


  


  


 


 


Estimated GFR (


American) 34.1 


  


  


  


 


 


Estimated GFR (Non-


American 29.4 


  


  


  


 


 


BUN/Creatinine Ratio 16.0 (10-20)    


 


Calcium Level


  8.9 mg/dl


(8.5-10.1) 


  


  


 


 


Total Bilirubin


  0.4 mg/dl


(0.2-1) 


  


  


 


 


Aspartate Amino Transf


(AST/SGOT) 18 U/L (15-37) 


  


  


  


 


 


Alanine Aminotransferase


(ALT/SGPT) 20 U/L (12-78) 


  


  


  


 


 


Alkaline Phosphatase


  175 U/L


() 


  


  


 


 


Pro-B-Type Natriuretic Peptide


  590 pg/ml


(0-900) 


  


  


 


 


Total Protein


  7.6 gm/dl


(6.4-8.2) 


  


  


 


 


Albumin


  3.9 gm/dl


(3.4-5.0) 


  


  


 


 


Globulin


  3.7 gm/dl


(2.5-4.0) 


  


  


 


 


Albumin/Globulin Ratio 1.1 (0.9-2)    


 


Bedside Lactic Acid Venous


  


  1.36 mmol/L


(0.90-1.70) 


  


 


 


Bedside Troponin I


  


  


  < 0.030 ng/ml


(0-0.045) 


 


 


Bedside Hemoglobin


  


  


  


  12.2 g/dl


(12.0-16.0)


 


Bedside Hematocrit    36 % (37-47) 


 


Bedside Sodium


  


  


  


  141 mEq/L


(135-144)


 


Bedside Potassium


  


  


  


  3.9 mEq/L


(3.3-5.0)


 


Bedside Chloride


  


  


  


  93 mEq/L


(101-112)


 


Bedside Total CO2


  


  


  


  35 mEq/l


(24-31)


 


Anion Gap


  


  


  


  18.0 mmol/L


(16-25)


 


Bedside Blood Urea Nitrogen


  


  


  


  29 mg/dl


(7-18)


 


Bedside Creatinine


  


  


  


  1.6 mg/dl


(0.6-1.3)


 


Bedside Glucose (other)


  


  


  


  177 mg/dl


(70-99)


 


Bedside Ionized Calcium ([place])


  


  


  


  1.16 mmol/l


(1.12-1.32)


 


Test


  3/17/18


10:15 3/17/18


10:38 


  


 


 


Urine Color YELLOW    


 


Urine Appearance CLEAR (CLEAR)    


 


Urine pH 5.5 (4.5-7.5)    


 


Urine Specific Gravity


  1.011


(1.000-1.030) 


  


  


 


 


Urine Protein NEG (NEG)    


 


Urine Glucose (UA) 1+ (NEG)    


 


Urine Ketones NEG (NEG)    


 


Urine Occult Blood NEG (NEG)    


 


Urine Nitrite NEG (NEG)    


 


Urine Bilirubin NEG (NEG)    


 


Urine Urobilinogen NEG (NEG)    


 


Urine Leukocyte Esterase NEG (NEG)    


 


Influenza Type A Antigen


  Neg for Influ


A (NEG) 


  


  


 


 


Influenza Type B Antigen


  Neg for Influ


B (NEG) 


  


  


 


 


Venous Blood pH


  


  7.27


(7.36-7.41) 


  


 


 


Venous Blood Partial Pressure


CO2 


  82 mmHg


(38.0-50.0) 


  


 


 


Venous Blood Partial Pressure


O2 


  39 mmHg 


  


  


 


 


Venous Blood HCO3  37 mmol/L   


 


Venous Blood Oxygen Saturation  67.2 %   


 


Venous Blood Base Excess  7.5 mEq/L   





Laboratory results reviewed by me





Medications Administered











 Medications


  (Trade)  Dose


 Ordered  Sig/Roya


 Route  Start Time


 Stop Time Status Last Admin


Dose Admin


 


 Albuterol/


 Ipratropium


  (Duoneb)  6 ml  Q4R


 INH  3/17/18 12:00


 4/16/18 11:59  3/17/18 10:22


6 ML


 


 Methylprednisolone


 Sodium Succinate


  (Solu-Medrol IV)  125 mg  NOW  STAT


 IV  3/17/18 09:38


 3/17/18 09:41 DC 3/17/18 10:13


125 MG


 


 Magnesium Sulfate


  (Magnesium


 Sulfate)  1 gm  NOW  STAT


 IV  3/17/18 09:38


 3/17/18 09:41 DC 3/17/18 10:13


1 GM


 


 Acetaminophen


  (Tylenol Tab)  650 mg  NOW  STAT


 PO  3/17/18 09:54


 3/17/18 09:56 DC 3/17/18 10:13


650 MG


 


 Levofloxacin


  (Levaquin / D5W)  750 mg  NOW  ONCE


 IV  3/17/18 11:30


 3/17/18 11:31 DC 3/17/18 11:44


750 MG











ECG Per My Interpretation


Indication:  SOB/dyspnea


Rate (beats per minute):  81


Rhythm:  normal sinus


Findings:  left axis deviation, other (normal intervals, no STS changes or TWI)





ED Course


0927: The patient was evaluated in room B6. A complete history and physical 

exam was performed.





1057: I reevaluated the patient. She is asleep. I will check back. 





1123: I discussed the patients case with Dr. Tidwell, [facility] Hospitalist. The 

patient will be further evaluated.





Medical Decision


The patient is a 73 year old white female with a past medical history of COPD, 

CHF, DM, DKA, hypertension and renal failure who presents to the ED with a cc 

of worsening breathing difficulty beginning a few days PTA. 





Triage Nursing notes reviewed.


The patient's presentation and history were concerning for breathing 

difficulties. 





Differential diagnosis:


Etiologies such as infections, reactive airway disease, pneumonia, pneumothorax

, COPD, CHF, cardiac ischemia, pulmonary embolism, musculoskeletal, 

gastrointestinal, as well as others were entertained.





Prior records were reviewed.  The patient does have multiple medical 

comorbidities and does reside in a assisted living personal care home.





Patient was seen and evaluated the bedside.  Patient does chronically wear 3 L 

of O2 at home.  Patient does also use a BiPAP at night.  Patient has complained 

of some chest congestion and shortness of breath.  Patient she does have 

shortness of breath just with rest.  She also has it with exertion.  Patient 

does not normally lay flat.  Patient denies any major weight gains or lower 

extremity swelling.  On exam the patient does have diffuse inspiratory and 

expiratory wheezing.  Patient is mildly tachypneic.  Patient does have some 1-2

+ pitting edema of the bilateral lower extremities.  Patient does have Lasix 

daily.  Patient's EKG was nonischemic and without arrhythmia.





Patient did have blood work completed, EKG, troponin, BNP, VBG, lactate, chest x

-ray.  Patient was given treatments for a likely COPD exacerbation.  Patient's 

BNP did show the patient had mildly elevated glucose at 177.  The patient has 

an elevated bicarb which is likely related to some chronic respiratory acidosis 

and compensatory metabolic alkalosis.  The patient has an anion gap of 13.  

Less likely DKA given her blood glucose of 177.  Patient's chest x-ray does 

show a left filtrate.  Patient was treated for healthcare associated pneumonia 

with Vanco, cefepime, and Levaquin.  Patient's VBG did show some hypercarbic 

respiratory failure.  I did speak with the respiratory therapist in order to 

place patient on BiPAP.  The hospitalist was paged.  I did speak with the 

hospitalist who agreed for to further evaluate and treat the patient.





Medication Reconcilliation


Current Medication List:  was personally reviewed by me





Blood Pressure Screening


Patient's blood pressure:  Elevated blood pressure


Blood pressure disposition:  Referred to PCP





Consults


Time Called:  1120


Consulting Physician:  Dr. Tidwell [facility] Hospitalist


Returned Call:  1123


I discussed the patients case with Dr. Tidwell [facility] Hospitalist. The patient 

will be further evaluated.





Impression





 Primary Impression:  


 COPD exacerbation


 Additional Impressions:  


 Hypercapnic respiratory failure


 Nosocomial pneumonia





Critical Care


I have personally spent greater than 42 minutes of critical care time in the 

direct management of this patient.  This includes bedside care, interpretation 

of diagnostic studies, and testing, discussion with consultants, patient, and 

family members, and other required patient management activities.  This 42 

minutes is in excess of all separately billable procedures.





Scribe Attestation


The scribe's documentation has been prepared under my direction and personally 

reviewed by me in its entirety. I confirm that the note above accurately 

reflects all work, treatment, procedures, and medical decision making performed 

by me.





Departure Information


Dispostion


Being Evaluated By Hospitalist





Referrals


Deny Guidry M.D. (PCP)





Problem Qualifiers








 Additional Impressions:  


 Hypercapnic respiratory failure


 Chronicity:  acute on chronic  Qualified Codes:  J96.22 - Acute and chronic 

respiratory failure with hypercapnia

## 2018-03-17 NOTE — HISTORY & PHYSICAL EXAMINATION
DATE OF ADMISSION:  03/17/2018

 

ADMISSION HISTORY AND PHYSICAL

 

CHIEF COMPLAINT:  Shortness of breath.

 

HISTORY OF PRESENT ILLNESS:  The patient is a very pleasant 73-year-old

female with a known longstanding history of severe COPD who noted somewhere

around mid week she started with congestion and cough and just generally not

feeling well.  She also notes that she was having difficulty with her BIPAP

at night and [facility] was actually supposed to come this Tuesday

to work with her on the device and/or fix problems; however, unfortunately

her breathing continued to worsen at her care facility, they assessed her and

felt that she needed to come to the ER and so she was brought here.  Here,

she was found to have hypoxia; however, the hypoxia appears to be

predominantly chronic but definitely acute hypercapnia as well as respiratory

distress and we were asked to admit her for further evaluation and treatment.

 She denies fevers, chills or sweats.  She did have body aches.  She has had

a cough and chest congestion, generally feeling short of breath.

 

REVIEW OF SYSTEMS:  Otherwise, entirely negative except for as above.

 

PAST MEDICAL HISTORY:  Includes severe COPD on chronic 3 liters oxygen with

chronic hypoxic and probably hypercapnic respiratory failure; CHF, uncertain

type; type 2 diabetes, on insulin; hyperlipidemia, hypertension; CKD,

probably around stage III, working on stage IV.

 

FAMILY HISTORY:  Includes diabetes and heart disease.

 

SOCIAL HISTORY:  She is a former smoker, none current.  She is currently at

[facility].

 

ALLERGIES:  ATROPINE AND DOBUTAMINE.

 

PAST SURGICAL HISTORY:  Multiple none of significant relevance they include

orthopedic surgeries, cholecystectomy, hysterectomy.

 

MEDICATIONS:  Symbicort b.i.d., vitamin B12 1000 mcg daily, iron sulfate

daily, Prozac 20 mg daily, Lasix 80 mg daily; NovoLog as needed at meals, it

sounds like she works with a sliding scale at her facility; Novolin N 30

units daily, DuoNebs q. 6 hours scheduled, Claritin 10 mg daily; metoprolol,

uncertain dose daily; Myrbetriq 25 mg daily, nystatin recently but that does

not appear to be an ongoing treatment was simply thrush type dosing,

potassium 20 mEq daily, Pravachol 10 mg at bedtime, Requip 2 mg t.i.d. and

trazodone 25 mg at bedtime.

 

PHYSICAL EXAMINATION:

VITAL SIGNS:  Initial vitals showed a temp 37.4, pulse 83, respiratory rate

24, blood pressure 174/85.  She was 50% on room air, 100% on nonrebreather

and now she is in the high 80s on nasal cannula oxygen, having briefly been

taken off the BiPAP because she felt like she was unable to converse with it

on.

GENERAL:  She is awake, alert, oriented x3, fatigued appearing, in mild

respiratory distress.

HEENT:  Normocephalic, atraumatic.  Mucous membranes are moist.

CARDIOVASCULAR:  Very distant, regular maybe slightly tachycardic.  No rubs,

murmurs or gallops.

LUNGS:  Show diminished air entry base left with a little bit of tactile

fremitus and E to A egophony.  She has diffuse wheezing throughout.  She is

using abdominal accessory muscles and she does not have much rib excursion.

ABDOMEN:  Soft, nondistended, nontender, no masses or organomegaly.

EXTREMITIES:  Without cyanosis or clubbing.  She has about trace bilateral

lower extremity edema without erythema or cords.  No calf tenderness.  She

notes this is about chronic.

SKIN:  Shows no rashes, no pallor or icterus.

NEUROLOGIC:  Shows cranial nerves II-XII to be grossly intact.  Gross motor

and sensory are intact.

MENTAL STATE:  Shows good recent and remote recall.  Normal mood and affect. 

Good judgment and insight.

MUSCULOSKELETAL:  Shows diminished rib excursion, balanced ligamentous

tension was done on the ribs bilaterally with a small degree of improvement

in range of motion.

 

LABORATORIES AND DIAGNOSTICS:  CBC shows a white count of 6.46, hemoglobin

12.7, hematocrit 38.3, platelets 135.  VBG with a pCO2 of 82 and a pH of

7.27.  Complete metabolic panel with sodium 138, potassium 3.8, chloride 96,

CO2 36, BUN 27, creatinine 1.68, calcium 8.9, glucose 170.  Total bili 0.4

with an AST of 18, ALT 20, alkaline phosphatase 175.  Troponin of less than

0.030.  BNP of 590, total protein 7.6, albumin 3.9.  PT 10.5, PTT of 25.7. 

Urinalysis yellow, clear, specific gravity 1.011, 1+ glucose and Flu

negative.

 

IMAGING DATA:  Chest x-ray reviewed by radiology as well as myself shows a

small left base infiltrate.  Radiology also sees pulmonary vascular

congestion.

 

ASSESSMENT AND PLAN:

1. Acute predominantly hypercapnic on chronic hypercapnic and hypoxic

respiratory failure.  This appears to be due to a small pneumonia flaring her

chronic obstructive pulmonary disease.  Will manage predominantly acutely

with BiPAP as well as treating the underlying causes.  See below.

2. Hypercapnic respiratory failure, acute superimposed on chronic.  This

relates to her chronic obstructive pulmonary disease.  Will be treating with

nebulizers and steroids as well as BIPAP to blow off her CO2 and follow up a

blood gas later on today.

3. Healthcare-associated left lower lobe pneumonia.  The Emergency Room has

initiated vancomycin, Levaquin and cefepime.  We will continue the Levaquin. 

Check methicillin-resistant staphylococcus aureus nares.  If it is positive,

then we will continue the vancomycin, if it is negative, we will hold the

vancomycin and continue with Levaquin alone.

4. Diabetes.  Her last A1c was 7.1 about 5 months ago.  We will recheck. 

Continue her home insulin, fingersticks and supplemental insulin

transitioning to a more aggressive basal bolus regimen as needed, certainly

while she is on higher dosing of steroids it would be likely she will need

more insulin, but will need to follow closely and determine this based on her

individual needs.

5. Congestive heart failure.  Certainly she appears compensated right now. 

Her last echocardiogram showed mild left ventricular hypokinesis and

diastolic dysfunction suggesting her chronic congestive heart failure is

probably diastolic.  She also has elevated right ventricular pressures

suggesting she probably has an element of chronic cor pulmonale, likely

related more to her chronic obstructive pulmonary disease than her left

heart.  At any rate, this all appears to be compensated.  We will continue

her home Lasix and follow her closely.

6. Chronic kidney disease, approximately stage III working IV.  Her

creatinine function is actually better than it has been, just reassuring

given the pneumonia.  Of note, the pneumonia shows no signs of sepsis outside

of the very nonspecific slightly fast heart rate.  The respiratory rate

certainly appears to be consistent with her hypercapnic respiratory failure. 

More than sepsis from the pneumonia and so well technically, she shows

systemic inflammatory response syndrome on her heart rate and her respiratory

rate appears to be more consistent with her chronic obstructive pulmonary

disease.

7. Deep venous thrombosis prophylaxis, Lovenox.

8. Disposition:  She will be [facility], physical therapy and occupational therapy consultations

and  to aid in disposition.

9. Rib somatic dysfunction, OMT done as above.

 

 

 

MTDD
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
## 2018-03-17 NOTE — DIAGNOSTIC IMAGING REPORT
CHEST ONE VIEW PORTABLE



CLINICAL HISTORY: EVALUATE RESPIRATORY DISTRESS.DYSPNEA dyspnea



COMPARISON STUDY:  11/20/2017



FINDINGS: Prominent pulmonary vasculature. Infiltrate left base. Diaphragms are

smooth. 



IMPRESSION:  Parenchymal infiltrate left base. Pulmonary vascular congestion. 











The above report was generated using voice recognition software.  It may contain

grammatical, syntax or spelling errors.









Electronically signed by:  Alfa Schultz M.D.

3/17/2018 10:09 AM



Dictated Date/Time:  3/17/2018 10:08 AM

## 2018-03-18 VITALS
TEMPERATURE: 98.06 F | HEART RATE: 79 BPM | SYSTOLIC BLOOD PRESSURE: 151 MMHG | OXYGEN SATURATION: 96 % | DIASTOLIC BLOOD PRESSURE: 84 MMHG

## 2018-03-18 VITALS
TEMPERATURE: 97.34 F | SYSTOLIC BLOOD PRESSURE: 159 MMHG | HEART RATE: 86 BPM | OXYGEN SATURATION: 92 % | DIASTOLIC BLOOD PRESSURE: 85 MMHG

## 2018-03-18 VITALS — OXYGEN SATURATION: 98 % | HEART RATE: 82 BPM

## 2018-03-18 VITALS
OXYGEN SATURATION: 90 % | SYSTOLIC BLOOD PRESSURE: 165 MMHG | DIASTOLIC BLOOD PRESSURE: 90 MMHG | HEART RATE: 90 BPM | TEMPERATURE: 97.7 F

## 2018-03-18 VITALS
OXYGEN SATURATION: 96 % | DIASTOLIC BLOOD PRESSURE: 84 MMHG | HEART RATE: 81 BPM | SYSTOLIC BLOOD PRESSURE: 157 MMHG | TEMPERATURE: 97.88 F

## 2018-03-18 VITALS — OXYGEN SATURATION: 98 %

## 2018-03-18 VITALS
HEART RATE: 85 BPM | TEMPERATURE: 98.42 F | SYSTOLIC BLOOD PRESSURE: 171 MMHG | DIASTOLIC BLOOD PRESSURE: 93 MMHG | OXYGEN SATURATION: 96 %

## 2018-03-18 VITALS
SYSTOLIC BLOOD PRESSURE: 143 MMHG | OXYGEN SATURATION: 95 % | HEART RATE: 85 BPM | TEMPERATURE: 97.88 F | DIASTOLIC BLOOD PRESSURE: 79 MMHG

## 2018-03-18 VITALS — HEART RATE: 83 BPM | OXYGEN SATURATION: 80 %

## 2018-03-18 VITALS — OXYGEN SATURATION: 98 % | HEART RATE: 80 BPM

## 2018-03-18 VITALS
TEMPERATURE: 98.06 F | OXYGEN SATURATION: 96 % | DIASTOLIC BLOOD PRESSURE: 76 MMHG | SYSTOLIC BLOOD PRESSURE: 143 MMHG | HEART RATE: 76 BPM

## 2018-03-18 VITALS — OXYGEN SATURATION: 95 %

## 2018-03-18 VITALS — HEART RATE: 88 BPM | OXYGEN SATURATION: 98 %

## 2018-03-18 LAB
BUN SERPL-MCNC: 39 MG/DL (ref 7–18)
CALCIUM SERPL-MCNC: 8.9 MG/DL (ref 8.5–10.1)
CO2 SERPL-SCNC: 33 MMOL/L (ref 21–32)
CREAT SERPL-MCNC: 1.98 MG/DL (ref 0.6–1.2)
GLUCOSE SERPL-MCNC: 334 MG/DL (ref 70–99)
POTASSIUM SERPL-SCNC: 4.4 MMOL/L (ref 3.5–5.1)
SODIUM SERPL-SCNC: 133 MMOL/L (ref 136–145)

## 2018-03-18 RX ADMIN — INSULIN ASPART SCH UNITS: 100 INJECTION, SOLUTION INTRAVENOUS; SUBCUTANEOUS at 11:00

## 2018-03-18 RX ADMIN — BUDESONIDE AND FORMOTEROL FUMARATE DIHYDRATE SCH PUFFS: 160; 4.5 AEROSOL RESPIRATORY (INHALATION) at 07:40

## 2018-03-18 RX ADMIN — ROPINIROLE HYDROCHLORIDE SCH MG: 1 TABLET, FILM COATED ORAL at 21:19

## 2018-03-18 RX ADMIN — FUROSEMIDE SCH MG: 40 TABLET ORAL at 07:41

## 2018-03-18 RX ADMIN — BUDESONIDE AND FORMOTEROL FUMARATE DIHYDRATE SCH PUFFS: 160; 4.5 AEROSOL RESPIRATORY (INHALATION) at 21:17

## 2018-03-18 RX ADMIN — METHYLPREDNISOLONE SODIUM SUCCINATE SCH MLS/MIN: 1 INJECTION, POWDER, FOR SOLUTION INTRAMUSCULAR; INTRAVENOUS at 07:55

## 2018-03-18 RX ADMIN — POTASSIUM CHLORIDE SCH MEQ: 1500 TABLET, EXTENDED RELEASE ORAL at 07:42

## 2018-03-18 RX ADMIN — ROPINIROLE HYDROCHLORIDE SCH MG: 1 TABLET, FILM COATED ORAL at 07:40

## 2018-03-18 RX ADMIN — METHYLPREDNISOLONE SODIUM SUCCINATE SCH MLS/MIN: 1 INJECTION, POWDER, FOR SOLUTION INTRAMUSCULAR; INTRAVENOUS at 13:01

## 2018-03-18 RX ADMIN — PRAVASTATIN SODIUM SCH MG: 10 TABLET ORAL at 21:18

## 2018-03-18 RX ADMIN — FLUOXETINE SCH MG: 20 CAPSULE ORAL at 07:43

## 2018-03-18 RX ADMIN — METOPROLOL SUCCINATE SCH MG: 50 TABLET, EXTENDED RELEASE ORAL at 21:18

## 2018-03-18 RX ADMIN — Medication SCH MCG: at 07:41

## 2018-03-18 RX ADMIN — Medication SCH MG: at 07:40

## 2018-03-18 RX ADMIN — IPRATROPIUM BROMIDE AND ALBUTEROL SULFATE SCH ML: .5; 3 SOLUTION RESPIRATORY (INHALATION) at 14:19

## 2018-03-18 RX ADMIN — TRAZODONE HYDROCHLORIDE SCH MG: 50 TABLET ORAL at 21:17

## 2018-03-18 RX ADMIN — IPRATROPIUM BROMIDE AND ALBUTEROL SULFATE SCH ML: .5; 3 SOLUTION RESPIRATORY (INHALATION) at 00:00

## 2018-03-18 RX ADMIN — IPRATROPIUM BROMIDE AND ALBUTEROL SULFATE SCH ML: .5; 3 SOLUTION RESPIRATORY (INHALATION) at 12:00

## 2018-03-18 RX ADMIN — INSULIN ASPART SCH UNITS: 100 INJECTION, SOLUTION INTRAVENOUS; SUBCUTANEOUS at 16:15

## 2018-03-18 RX ADMIN — INSULIN ASPART SCH UNITS: 100 INJECTION, SOLUTION INTRAVENOUS; SUBCUTANEOUS at 07:54

## 2018-03-18 RX ADMIN — MIRABEGRON SCH MG: 25 TABLET, FILM COATED, EXTENDED RELEASE ORAL at 07:42

## 2018-03-18 RX ADMIN — ROPINIROLE HYDROCHLORIDE SCH MG: 1 TABLET, FILM COATED ORAL at 13:01

## 2018-03-18 RX ADMIN — METHYLPREDNISOLONE SODIUM SUCCINATE SCH MLS/MIN: 1 INJECTION, POWDER, FOR SOLUTION INTRAMUSCULAR; INTRAVENOUS at 21:17

## 2018-03-18 RX ADMIN — IPRATROPIUM BROMIDE AND ALBUTEROL SULFATE SCH ML: .5; 3 SOLUTION RESPIRATORY (INHALATION) at 19:31

## 2018-03-18 RX ADMIN — ENOXAPARIN SODIUM SCH MG: 30 INJECTION SUBCUTANEOUS at 17:17

## 2018-03-18 RX ADMIN — FERROUS SULFATE TAB EC 325 MG (65 MG FE EQUIVALENT) SCH MG: 325 (65 FE) TABLET DELAYED RESPONSE at 07:41

## 2018-03-18 RX ADMIN — INSULIN ASPART SCH UNITS: 100 INJECTION, SOLUTION INTRAVENOUS; SUBCUTANEOUS at 21:26

## 2018-03-18 RX ADMIN — IPRATROPIUM BROMIDE AND ALBUTEROL SULFATE SCH ML: .5; 3 SOLUTION RESPIRATORY (INHALATION) at 09:00
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
## 2018-03-19 VITALS — OXYGEN SATURATION: 94 % | HEART RATE: 77 BPM

## 2018-03-19 VITALS
DIASTOLIC BLOOD PRESSURE: 92 MMHG | SYSTOLIC BLOOD PRESSURE: 170 MMHG | HEART RATE: 71 BPM | OXYGEN SATURATION: 99 % | TEMPERATURE: 97.7 F

## 2018-03-19 VITALS — OXYGEN SATURATION: 96 %

## 2018-03-19 VITALS
DIASTOLIC BLOOD PRESSURE: 85 MMHG | TEMPERATURE: 97.34 F | SYSTOLIC BLOOD PRESSURE: 156 MMHG | HEART RATE: 77 BPM | OXYGEN SATURATION: 94 %

## 2018-03-19 VITALS — HEART RATE: 80 BPM | OXYGEN SATURATION: 98 %

## 2018-03-19 VITALS
SYSTOLIC BLOOD PRESSURE: 156 MMHG | HEART RATE: 71 BPM | OXYGEN SATURATION: 95 % | TEMPERATURE: 97.34 F | DIASTOLIC BLOOD PRESSURE: 85 MMHG

## 2018-03-19 VITALS — OXYGEN SATURATION: 97 % | HEART RATE: 78 BPM

## 2018-03-19 VITALS
OXYGEN SATURATION: 95 % | HEART RATE: 72 BPM | TEMPERATURE: 98.42 F | SYSTOLIC BLOOD PRESSURE: 142 MMHG | DIASTOLIC BLOOD PRESSURE: 78 MMHG

## 2018-03-19 VITALS — OXYGEN SATURATION: 95 %

## 2018-03-19 VITALS — HEART RATE: 80 BPM | OXYGEN SATURATION: 96 %

## 2018-03-19 LAB
BUN SERPL-MCNC: 59 MG/DL (ref 7–18)
CALCIUM SERPL-MCNC: 8.9 MG/DL (ref 8.5–10.1)
CO2 SERPL-SCNC: 35 MMOL/L (ref 21–32)
CREAT SERPL-MCNC: 1.87 MG/DL (ref 0.6–1.2)
EOSINOPHIL NFR BLD AUTO: 125 K/UL (ref 130–400)
GLUCOSE SERPL-MCNC: 152 MG/DL (ref 70–99)
HBA1C MFR BLD: 7.7 % (ref 4.5–5.6)
HCT VFR BLD CALC: 33.5 % (ref 37–47)
HGB BLD-MCNC: 11.1 G/DL (ref 12–16)
MCH RBC QN AUTO: 31.1 PG (ref 25–34)
MCHC RBC AUTO-ENTMCNC: 33.1 G/DL (ref 32–36)
MCV RBC AUTO: 93.8 FL (ref 80–100)
PMV BLD AUTO: 10.4 FL (ref 7.4–10.4)
POTASSIUM SERPL-SCNC: 4.3 MMOL/L (ref 3.5–5.1)
RED CELL DISTRIBUTION WIDTH CV: 13.6 % (ref 11.5–14.5)
RED CELL DISTRIBUTION WIDTH SD: 46.7 FL (ref 36.4–46.3)
SODIUM SERPL-SCNC: 139 MMOL/L (ref 136–145)
WBC # BLD AUTO: 5.79 K/UL (ref 4.8–10.8)

## 2018-03-19 RX ADMIN — INSULIN ASPART SCH UNITS: 100 INJECTION, SOLUTION INTRAVENOUS; SUBCUTANEOUS at 04:00

## 2018-03-19 RX ADMIN — FLUOXETINE SCH MG: 20 CAPSULE ORAL at 07:37

## 2018-03-19 RX ADMIN — FUROSEMIDE SCH MG: 40 TABLET ORAL at 07:38

## 2018-03-19 RX ADMIN — METHYLPREDNISOLONE SODIUM SUCCINATE SCH MLS/MIN: 1 INJECTION, POWDER, FOR SOLUTION INTRAMUSCULAR; INTRAVENOUS at 07:38

## 2018-03-19 RX ADMIN — POTASSIUM CHLORIDE SCH MEQ: 1500 TABLET, EXTENDED RELEASE ORAL at 07:38

## 2018-03-19 RX ADMIN — BUDESONIDE AND FORMOTEROL FUMARATE DIHYDRATE SCH PUFFS: 160; 4.5 AEROSOL RESPIRATORY (INHALATION) at 07:39

## 2018-03-19 RX ADMIN — ROPINIROLE HYDROCHLORIDE SCH MG: 1 TABLET, FILM COATED ORAL at 07:37

## 2018-03-19 RX ADMIN — METHYLPREDNISOLONE SODIUM SUCCINATE SCH MLS/MIN: 1 INJECTION, POWDER, FOR SOLUTION INTRAMUSCULAR; INTRAVENOUS at 14:17

## 2018-03-19 RX ADMIN — Medication SCH MCG: at 07:37

## 2018-03-19 RX ADMIN — IPRATROPIUM BROMIDE AND ALBUTEROL SULFATE SCH ML: .5; 3 SOLUTION RESPIRATORY (INHALATION) at 14:12

## 2018-03-19 RX ADMIN — Medication SCH MG: at 07:43

## 2018-03-19 RX ADMIN — INSULIN ASPART SCH UNITS: 100 INJECTION, SOLUTION INTRAVENOUS; SUBCUTANEOUS at 16:15

## 2018-03-19 RX ADMIN — IPRATROPIUM BROMIDE AND ALBUTEROL SULFATE SCH ML: .5; 3 SOLUTION RESPIRATORY (INHALATION) at 07:10

## 2018-03-19 RX ADMIN — INSULIN ASPART SCH UNITS: 100 INJECTION, SOLUTION INTRAVENOUS; SUBCUTANEOUS at 12:26

## 2018-03-19 RX ADMIN — INSULIN ASPART SCH UNITS: 100 INJECTION, SOLUTION INTRAVENOUS; SUBCUTANEOUS at 08:15

## 2018-03-19 RX ADMIN — INSULIN ASPART SCH UNITS: 100 INJECTION, SOLUTION INTRAVENOUS; SUBCUTANEOUS at 00:10

## 2018-03-19 RX ADMIN — MIRABEGRON SCH MG: 25 TABLET, FILM COATED, EXTENDED RELEASE ORAL at 07:37

## 2018-03-19 RX ADMIN — ROPINIROLE HYDROCHLORIDE SCH MG: 1 TABLET, FILM COATED ORAL at 14:17

## 2018-03-19 RX ADMIN — FERROUS SULFATE TAB EC 325 MG (65 MG FE EQUIVALENT) SCH MG: 325 (65 FE) TABLET DELAYED RESPONSE at 07:43

## 2018-03-19 NOTE — DISCHARGE SUMMARY
Discharge Summary


Date of Service


Mar 19, 2018.





Discharge Summary


Admission Date:


Mar 17, 2018 at 13:18


Discharge Date:  Mar 19, 2018


Discharge Disposition:  Personal care ([facility] assisted living)


Principal Diagnosis:  COPD exacerbation, pneumonia


Problems/Secondary Diagnoses:


Chronic diastolic CHF, HTN, HLD, DM II, CKD stage IV


Immunizations:  


   Have You Had Influenza Vaccine:  Yes


   Influenza Vaccine Date:  Oct 17, 2013


   History of Tetanus Vaccine?:  Yes


   Tetanus Immunization Date:  Oct 8, 2013


   History of Pneumococcal:  Yes


   Pneumococcal Date:  Nov 17, 2010


   History of Hepatitis B Vaccine:  No





Medication Reconciliation


New Medications:  


Prednisone (Prednisone) 20 Mg Tab


20 MG PO UD for 9 Days, #21 TAB


Take 40 mg twice a day for 3 days, then 40 mg daily for 3 days, then


 20 mg daily for 3 days, then stop


Levofloxacin (Levofloxacin) 750 Mg Tab


750 MG PO Q48H for 4 Days, #2 TAB


Take 1 tablet on 3/21 and 1 tablet on 3/23


 


Continued Medications:  


Budesonide/Formoterol Fumarate (Symbicort 160-4.5 Mcg/Act) 60 Puffs/Inhaler Aero


2 PUFFS INH BID, #1 INHALER 2 Refills





Cyanocobalamin (Vitamin B12 500MCG) 500 Mcg Tab


1000 MCG PO DAILY





Ferrous Sulfate (Kp Ferrous Sulfate) 325 Mg Tab


1 TAB PO DAILY for 30 Days, #30 TAB 3 Refills





Fluoxetine Hcl (Prozac) 20 Mg Cap


20 MG PO QAM





Furosemide (Furosemide) 40 Mg Tab


80 MG PO DAILY





Insulin Aspart (Novolog Flexpen) 100 Units/Ml Inj


0 UNITS SC ACHS for 30 Days


ISS goal of range, 110 to 140


 


 CF 20 mg/dl/unit


 INS: Cho ratio= 1 unit per 10 grams cho consumed


Insulin Human NPH (Novolin N) 100 Units/Ml Susp


30 UNITS SC QDB for 30 Days





Ipratropium-Albuterol (Duoneb) 3 Ml Nebu


1 TREATMENT INH Q6HWA for 30 Days, #150 UNITS


Use 1 neb treatment every 6 hours while awake.  May use


 every 2 hours as needed for shortness of breath & wheezing.


Loratadine (Claritin) 10 Mg Tab


10 MG PO QAM for 30 Days, TAB





Metoprolol Succ (Toprol Xl) (Toprol-Xl) 50 Mg Tabcr


1 TAB PO PM for 30 Days, #30 TAB 5 Refills





Mirabegron (Myrbetriq Er) 25 Mg Tab


25 MG PO DAILY, TAB





Nystatin (Nystatin) 5 Ml Susp


5 ML PO QID for 11 Days, #220 ML


Take 5 ml (1 teaspoon) by mouth four times a day.


Potassium Ext Rel (Klor-Con) 20 Meq Tabcr


20 MEQ PO DAILY, TAB





Pravastatin Sodium (Pravastatin Sodium) 10 Mg Tab


10 MG PO QPM





Ropinirole (Requip) 2 Mg Tab


2 MG PO TID





Trazodone Hcl (Trazodone) 50 Mg Tab


25 MG PO HS, TAB











Discharge Exam


The patient reports feeling well.  She states her breathing is around baseline.

  She reports a non-productive cough.  The patient denies fevers, chills, sweats

, chest pain, palpitations, claudication, wheezing, shortness of breath, nausea

, vomiting, abdominal pain, dysuria, hematuria, urinary retention, paralysis, 

weakness, numbness and tingling.





Constitutional:  No fever, No chills, No sweats


Eyes:  No worsening of vision, No eye pain, No diplopia


ENT:  No hearing loss, No nasal symptoms, No trouble swallowing


Respiratory:  +Cough. No wheezing, No shortness of breath


Cardiovascular:  No chest pain, No claudication, No palpitations


Abdomen:  No pain, No nausea, No vomiting


Musculoskeletal:  No joint pain, No muscle pain, No swelling


Genitourinary - Female:  No dysuria, No urinary retention, No hematuria


Neurologic:  No paralysis, No weakness, No numbness/tingling


Integumentary:  No rash, No itch, No color change





General appearance:  +Obese.  Well-developed, well-nourished, no apparent 

distress


Head:  Normocephalic, atraumatic


Eyes:  Normal inspection, PERRL, EOMI


ENT:  Normal ENT inspection, hearing grossly normal, pharynx normal


Neck:  Supple, no JVD, trachea midline


Respiratory/Chest:  +Decreased breath sounds.  Lungs clear to auscultation, no 

respiratory distress


Cardiovascular:  Regular rate & rhythm, no gallop, no murmur


Abdomen/GI:  Normal bowel sounds, non-tender, soft


Extremities/Musculoskeletal:  +Trace pitting edema.  Normal inspection, no calf 

tenderness


Neurological/Psych:  Alert, normal mood/affect, oriented x 3


Skin:  Normal color, warm/dry, no rash





Hospital Course


74 y/o female with a history of severe COPD on continuous 3L NC and BiPAP, 

chronic diastolic CHF, HTN, HLD, DM II, CKD stage IV, depression, and RLS who 

presents with shortness of breath and malaise.





Acute on chronic hypercapnic/hypoxic respiratory failure secondary to COPD 

exacerbation and PNA


-Admit to telemetry.  No acute events overnight, pt in SR with HR 60s-70s.


-Flu negative


-Blood cultures NGTD


-Continue BiPAP, O2 by protocol.  Wears BiPAP and night and 3L NC continuous 

during day at baseline.  Currently on 3L NC


-Continue Solu-Medrol 60 mg IV TID.  D/C with prednisone taper:  40 mg PO BID x 

3 days, 40 mg PO qd x 3 days, 20 mg PO qd x 3 days, then stop


-Continue scheduled nebs.  Pt has DuoNebs at home


-Continue Levaquin 750 mg PO q48h, day #3 of 7.  Will continue as outpatient.


-Continue Symbicort BID


-Recommend repeat CXR in 6 weeks to ensure resolution of PNA





Chronic diastolic CHF--stable, no acute exacerbation


-Continue Lasix 80 mg PO qd, metoprolol succinate 50 mg PO qd





HTN, HLD--stable


-Continue metoprolol as above, pravastatin 10 mg PO hs





DM II--HgbA1c 7.7 on 3/17


-Pharmacy consulted for glycemic control


-Lantus 20 units SC BID here, home NPH was held


-Insulin sliding scale


-Check BSGs q ac and qhs





CKD stage IV--stable


-Creatinine around baseline





Depression--stable


-Continue fluoxetine 20 mg PO qd





RLS


-Continue Requip 2 mg PO TID





DVT prophylaxis


-Enoxaparin 30 mg SC q24h





Dispo


-From [facility] living, will return


-Recommend PT and OT services there





===================================================================





I personally interviewed and examined the patient.


I agree with history of present illness and physical exam mentioned above, I 

also performed my own history taking and examination.


Past medical history and review of system has been obtained by myself


I reviewed all pertinent labs and studies


Reviewed current medications


I discussed and formulated of the assessment and plan mentioned above.


Please refer to the Summary mentioned below.








73-year-old female with chronic respiratory failure on 3-4 L of oxygen at home 

secondary to severe COPD, diastolic congestive heart failure, hypertension, 

dyslipidemia, diabetes mellitus type 2, stage III chronic kidney disease and 

depression presented to the ED with acute on chronic respiratory failure 

increased shortness of breath.  Patient was found to have subtle pneumonia, 

started on levofloxacin, steroids and bronchodilators.  Patient significantly 

improved and currently back to her baseline 3 L of oxygen nasal cannula.  

Patient will be discharged home on tapering dose of prednisone and 

bronchodilators.  Patient will follow up with her primary care physician and 

pulmonologist.





General Appearance: not in acute distress, morbidly obese


Eyes: normal Sclerae,  extraocular muscle intact


ENT:  hearing grossly normal


Neck:  supple


Respiratory/Chest: normal air entry  bilateral ,no respiratory distress, no 

accessory muscle use


Cardiovascular:  regular rate, rhythm, no  murmur


Abdomen:   non tender, soft, no masses


Extremities:  no edema


Neurologic/Psychiatric: Awake alert oriented times place and person moves all 

extremities sensation intact cranial nerves II-12 appear to be intact


Skin:  normal color, warm/dry, no rash





Mp Clark MD, 


[facility] hospitalist group


Total Time Spent:  Greater than 30 minutes


This includes examination of the patient, discharge planning, medication 

reconciliation, and communication with other providers.





Discharge Instructions


Please refer to the electronic Patient Visit Report (Discharge Instructions) 

for additional information.





Additional Copies To


Deny Guidry M.D.

## 2018-03-19 NOTE — DISCHARGE INSTRUCTIONS
Discharge Instructions


Date of Service


Mar 19, 2018.





Admission


Reason for Admission:  Copd Exacerbation, Hypercapnic Respiratory Failure





Discharge


Discharge Diagnosis / Problem:  Chronic obstructive pulmondary disease 

exacerbation, pneumonia





Discharge Goals


Goal(s):  Decrease discomfort, Improve function, Diagnostic testing, 

Therapeutic intervention





Activity Recommendations


Activity Limitations:  resume your previous activity (as tolerated)





.





Instructions / Follow-Up


Instructions / Follow-Up


You were admitted to the hospital with an acute exacerbation of your chronic 

obstructive pulmonary disease, as well as a small pneumonia.  You were treated 

with IV steroids and antibiotics.  As you are now back to your baseline oxygen 

requirement and feeling better, you are stable for discharge back to assisted 

living.





Medications:





*Please take levofloxacin (Levaquin) 750 mg every other day for 2 more doses.  

These doses are due on 3/21 and 3/23.





*Please use your DuoNeb nebulizer scheduled four times a day for the first few 

days.  As your breathing improves, you can then use it just as needed.





*Please take the following prednisone taper as directed:


   -Take 40 mg (2 tablets) twice a day for 3 days, then


   -Take 40 mg (2 tablets) once a day for 3 days, then


   -Take 20 mg (1 tablet) once a day for 3 days, then stop.





*Continue your home medications as prescribed.





Follow up:





*Follow up with your primary care provider within 1 week of discharge.





***Please have a follow up chest x-ray in about 6 weeks to ensure resolution of 

your pneumonia.





Please seek medical attention if you experience fevers, chills, sweats, 

dizziness/lightheadedness, loss of consciousness, chest pain, shortness of 

breath, nausea, vomiting, numbness or tingling.





Current Hospital Diet


Patient's current hospital diet: Low Sodium Diet (2gm Na), Diabetes Type 2 Diet





Discharge Diet


Recommended Diet:  Low Sodium Diet (2gm Na), Diabetes Type 2 Diet





Pending Studies


Studies pending at discharge:  no





Laboratory Results





Hemoglobin A1c








Test


  3/17/18


09:30 Range/Units


 


 


Estimated Average Glucose 174   mg/dl


 


Hemoglobin A1c 7.7 H 4.5-5.6  %











Medical Emergencies








.


Who to Call and When:





Medical Emergencies:  If at any time you feel your situation is an emergency, 

please call 911 immediately.





.





Non-Emergent Contact


Non-Emergency issues call your:  Primary Care Provider


Call Non-Emergent contact if:  you have a fever, you have any medication 

questions





.





Past History


Medical & Surgical History:  


(1) COPD exacerbation


(2) Pneumonia


.








"Provider Documentation" section prepared by Shelia Doty.








.

## 2018-03-19 NOTE — PHARMACY PROGRESS NOTE
Glycemic Control Intl Consult


Date of Service


Mar 19, 2018.





Scope


Glycemic Pharmacist consulted by Dr Izaguirre on 3/18/18 for glycemic control and 

to write orders per [facility] inpatient glycemic control protocol





Objective


Weight (Kilograms):  91.400


Accuchecks BSG (last 24hrs):











Test


  3/18/18


11:23 3/18/18


16:31 3/18/18


20:22 3/18/18


23:54


 


Bedside Glucose


  154 mg/dl


(70-90) 333 mg/dl


(70-90) 372 mg/dl


(70-90) 178 mg/dl


(70-90)


 


Test


  3/19/18


04:06 3/19/18


07:10 3/19/18


07:28 


 


 


Bedside Glucose


  133 mg/dl


(70-90) 


  157 mg/dl


(70-90) 


 


 


Random Glucose


  


  152 mg/dl


(70-99) 


  


 








Laboratory Data (last 24hrs)











Test


  3/19/18


07:10 3/19/18


07:13


 


Anion Gap 6.0 mmol/L  


 


BUN/Creatinine Ratio 31.6  


 


Blood Urea Nitrogen 59 mg/dl  


 


Creatinine 1.87 mg/dl  


 


Potassium Level 4.3 mmol/L  


 


Sodium Level 139 mmol/L  


 


White Blood Count  5.79 K/uL 








HbA1c











Test


  3/17/18


09:30


 


Hemoglobin A1c


  7.7 %


(4.5-5.6)  H











Recent Pertinent Medications


Outpatient Anti-diabetic Regimen: 


* NPH 30 units with breakfast


* Novolog ACHS


 * Goal 110-140


 * CF 20


 * CR 10








The patient is currently receiving:


* Basal insulin:              Lantus 20 units every 12 hours, received 40 units 

x 1 last evening





* Correctional Insulin:     Novolog Correction per scale ACHS


                            Goal Range: Low 110 mg/dL - High 140 mg/dL


                            Correction Factor: 15 mg/dL/unit





* Prandial insulin:           Per carb ratio of 1 unit per 5 grams CHO consumed











Risk Factors for Insulin Resistance:


* Steroids: Solu-medrol 60 mg TID


* Infection: HAP, on Levaquin


* Diet: type 2 diabetes





Assessment & Plan


ASSESSMENT:


* Patient with type 2 diabetes, admitted with COPD exacerbation/HAP, started on 

high dose IV steroids


* Pharmacy was consulted for glycemic management last evening, when BSGs were 

in the 300s


* Changes made were:


 * Change basal to Lantus and increase dose based on insulin calc estimates 

using stress level of 2


 * Tighten CF


 * Add carb ratio


 * Add overnight checks


 * Bolus of IV insulin x 1


* BSGs have significantly improved this AM


* Patient remains on Solu-medrol 60 mg IV TID


* Will plan to continue same regimen for now, adjusting if BSGs continue to 

fall and/or steroids are changed








PLAN FOR INPATIENT GLYCEMIC CONTROL:


* Continue Lantus 20 units BID


* Continue Novolog ACHS


 * Goal 110-140


 * CF 15


 * CR 5


* No overnight checks since BSGs have improved





RECOMMENDATIONS FOR DISCHARGE:


* A1c of 7.7% is acceptable for patient's age


* Continue outpatient regimen upon discharge


* Consider contacting pharmacy for recommendations if patient to be discharged 

on a steroid taper





Thank you.